# Patient Record
Sex: FEMALE | Race: WHITE | NOT HISPANIC OR LATINO | Employment: FULL TIME | URBAN - METROPOLITAN AREA
[De-identification: names, ages, dates, MRNs, and addresses within clinical notes are randomized per-mention and may not be internally consistent; named-entity substitution may affect disease eponyms.]

---

## 1995-01-18 LAB — PAP-ABSTRACT: NORMAL

## 1996-02-14 LAB — PAP-ABSTRACT: NORMAL

## 1997-02-10 LAB — PAP-ABSTRACT: NORMAL

## 1998-03-16 LAB — PAP-ABSTRACT: NORMAL

## 1999-03-08 LAB — PAP-ABSTRACT: NORMAL

## 2000-03-30 LAB — PAP-ABSTRACT: NORMAL

## 2001-03-02 LAB — PAP-ABSTRACT: NORMAL

## 2002-02-27 LAB — PAP-ABSTRACT: NORMAL

## 2003-02-27 LAB — PAP-ABSTRACT: NORMAL

## 2004-03-24 LAB — PAP-ABSTRACT: NORMAL

## 2005-03-23 LAB — PAP-ABSTRACT: NORMAL

## 2006-04-12 LAB
CREAT SERPL-MCNC: 0.9 MG/DL
GFR SERPL CREATININE-BSD FRML MDRD: NORMAL ML/MIN/1.73M2
TSH SERPL-ACNC: 2.44 MIU/L

## 2007-05-16 LAB
CHOLEST SERPL-MCNC: 203 MG/DL
CREAT SERPL-MCNC: 0.9 MG/DL
GFR SERPL CREATININE-BSD FRML MDRD: NORMAL ML/MIN/1.73M2
HDLC SERPL-MCNC: 72 MG/DL
LDLC SERPL CALC-MCNC: 119 MG/DL
NONHDLC SERPL-MCNC: NORMAL MG/DL
PAP-ABSTRACT: NORMAL
TRIGL SERPL-MCNC: 58 MG/DL

## 2007-12-03 LAB — PAP-ABSTRACT: NORMAL

## 2008-09-05 LAB
CHOLEST SERPL-MCNC: 219 MG/DL
CREAT SERPL-MCNC: 0.81 MG/DL
GFR SERPL CREATININE-BSD FRML MDRD: NORMAL ML/MIN/1.73M2
HDLC SERPL-MCNC: 81 MG/DL
LDLC SERPL CALC-MCNC: 124 MG/DL
NONHDLC SERPL-MCNC: NORMAL MG/DL
PAP-ABSTRACT: NORMAL
TRIGL SERPL-MCNC: 69 MG/DL

## 2009-09-16 LAB
CHOLEST SERPL-MCNC: 184 MG/DL
HDLC SERPL-MCNC: 67 MG/DL
LDLC SERPL CALC-MCNC: 103 MG/DL
NONHDLC SERPL-MCNC: NORMAL MG/DL
TRIGL SERPL-MCNC: 69 MG/DL

## 2010-10-06 LAB — PAP-ABSTRACT: NORMAL

## 2011-10-05 LAB
25 OH VIT D TOTAL: NORMAL
25 OH VIT D3: NORMAL
CHOLEST SERPL-MCNC: 200 MG/DL
CREAT SERPL-MCNC: 0.81 MG/DL
GFR SERPL CREATININE-BSD FRML MDRD: NORMAL ML/MIN/1.73M2
HDLC SERPL-MCNC: 69 MG/DL
LDLC SERPL CALC-MCNC: 114 MG/DL
NONHDLC SERPL-MCNC: NORMAL MG/DL
TRIGL SERPL-MCNC: 83 MG/DL
VITAMIN D2 SERPL-MCNC: NORMAL PG/ML

## 2011-10-17 LAB — PAP-ABSTRACT: NORMAL

## 2012-10-16 LAB
CHOLEST SERPL-MCNC: 202 MG/DL
HDLC SERPL-MCNC: 72 MG/DL
LDLC SERPL CALC-MCNC: 106 MG/DL
NONHDLC SERPL-MCNC: 130 MG/DL
TRIGL SERPL-MCNC: 120 MG/DL

## 2012-10-23 LAB — PAP-ABSTRACT: NORMAL

## 2013-10-21 LAB
CHOLEST SERPL-MCNC: 199 MG/DL
HDLC SERPL-MCNC: 77 MG/DL
LDLC SERPL CALC-MCNC: 108 MG/DL
NONHDLC SERPL-MCNC: NORMAL MG/DL
TRIGL SERPL-MCNC: 71 MG/DL

## 2014-12-02 LAB
CHOLEST SERPL-MCNC: 202 MG/DL
HDLC SERPL-MCNC: 80 MG/DL
LDLC SERPL CALC-MCNC: 108 MG/DL
NONHDLC SERPL-MCNC: NORMAL MG/DL
TRIGL SERPL-MCNC: 68 MG/DL

## 2016-04-12 LAB
CHOLEST SERPL-MCNC: 202 MG/DL
HDLC SERPL-MCNC: 72 MG/DL
LDLC SERPL CALC-MCNC: 117 MG/DL
NONHDLC SERPL-MCNC: NORMAL MG/DL
TRIGL SERPL-MCNC: 65 MG/DL

## 2017-01-29 ENCOUNTER — APPOINTMENT (OUTPATIENT)
Dept: CT IMAGING | Facility: CLINIC | Age: 57
End: 2017-01-29
Attending: EMERGENCY MEDICINE
Payer: COMMERCIAL

## 2017-01-29 ENCOUNTER — APPOINTMENT (OUTPATIENT)
Dept: CARDIOLOGY | Facility: CLINIC | Age: 57
End: 2017-01-29
Attending: INTERNAL MEDICINE
Payer: COMMERCIAL

## 2017-01-29 ENCOUNTER — HOSPITAL ENCOUNTER (OUTPATIENT)
Facility: CLINIC | Age: 57
Setting detail: OBSERVATION
Discharge: HOME | End: 2017-01-30
Attending: EMERGENCY MEDICINE | Admitting: INTERNAL MEDICINE
Payer: COMMERCIAL

## 2017-01-29 DIAGNOSIS — R55 SYNCOPE, UNSPECIFIED SYNCOPE TYPE: Primary | ICD-10-CM

## 2017-01-29 DIAGNOSIS — R07.9 ACUTE CHEST PAIN: ICD-10-CM

## 2017-01-29 LAB
ALBUMIN SERPL-MCNC: 3.4 G/DL (ref 3.4–5)
ALP SERPL-CCNC: 78 U/L (ref 40–150)
ALT SERPL W P-5'-P-CCNC: 54 U/L (ref 0–50)
ANION GAP SERPL CALCULATED.3IONS-SCNC: 6 MMOL/L (ref 3–14)
AST SERPL W P-5'-P-CCNC: 44 U/L (ref 0–45)
BASOPHILS # BLD AUTO: 0 10E9/L (ref 0–0.2)
BASOPHILS NFR BLD AUTO: 0.2 %
BILIRUB SERPL-MCNC: 0.3 MG/DL (ref 0.2–1.3)
BUN SERPL-MCNC: 16 MG/DL (ref 7–30)
CALCIUM SERPL-MCNC: 8.5 MG/DL (ref 8.5–10.1)
CHLORIDE SERPL-SCNC: 104 MMOL/L (ref 94–109)
CO2 SERPL-SCNC: 31 MMOL/L (ref 20–32)
CREAT SERPL-MCNC: 0.86 MG/DL (ref 0.52–1.04)
DIFFERENTIAL METHOD BLD: ABNORMAL
EOSINOPHIL # BLD AUTO: 0.1 10E9/L (ref 0–0.7)
EOSINOPHIL NFR BLD AUTO: 2 %
ERYTHROCYTE [DISTWIDTH] IN BLOOD BY AUTOMATED COUNT: 12.4 % (ref 10–15)
GFR SERPL CREATININE-BSD FRML MDRD: 68 ML/MIN/1.7M2
GLUCOSE SERPL-MCNC: 105 MG/DL (ref 70–99)
HCT VFR BLD AUTO: 38.6 % (ref 35–47)
HGB BLD-MCNC: 13.1 G/DL (ref 11.7–15.7)
IMM GRANULOCYTES # BLD: 0 10E9/L (ref 0–0.4)
IMM GRANULOCYTES NFR BLD: 0.2 %
INTERPRETATION ECG - MUSE: NORMAL
LYMPHOCYTES # BLD AUTO: 1.2 10E9/L (ref 0.8–5.3)
LYMPHOCYTES NFR BLD AUTO: 17.9 %
MAGNESIUM SERPL-MCNC: 2.1 MG/DL (ref 1.6–2.3)
MCH RBC QN AUTO: 31.4 PG (ref 26.5–33)
MCHC RBC AUTO-ENTMCNC: 33.9 G/DL (ref 31.5–36.5)
MCV RBC AUTO: 93 FL (ref 78–100)
MONOCYTES # BLD AUTO: 0.5 10E9/L (ref 0–1.3)
MONOCYTES NFR BLD AUTO: 7.6 %
NEUTROPHILS # BLD AUTO: 4.6 10E9/L (ref 1.6–8.3)
NEUTROPHILS NFR BLD AUTO: 72.1 %
NRBC # BLD AUTO: 0 10*3/UL
NRBC BLD AUTO-RTO: 0 /100
PLATELET # BLD AUTO: 140 10E9/L (ref 150–450)
POTASSIUM SERPL-SCNC: 4 MMOL/L (ref 3.4–5.3)
PROT SERPL-MCNC: 6.7 G/DL (ref 6.8–8.8)
RBC # BLD AUTO: 4.17 10E12/L (ref 3.8–5.2)
SODIUM SERPL-SCNC: 141 MMOL/L (ref 133–144)
TROPONIN I SERPL-MCNC: NORMAL UG/L (ref 0–0.04)
WBC # BLD AUTO: 6.4 10E9/L (ref 4–11)

## 2017-01-29 PROCEDURE — 99253 IP/OBS CNSLTJ NEW/EST LOW 45: CPT | Performed by: NEUROLOGICAL SURGERY

## 2017-01-29 PROCEDURE — 25000125 ZZHC RX 250: Performed by: EMERGENCY MEDICINE

## 2017-01-29 PROCEDURE — 80053 COMPREHEN METABOLIC PANEL: CPT | Performed by: EMERGENCY MEDICINE

## 2017-01-29 PROCEDURE — G0378 HOSPITAL OBSERVATION PER HR: HCPCS

## 2017-01-29 PROCEDURE — 99285 EMERGENCY DEPT VISIT HI MDM: CPT | Mod: 25

## 2017-01-29 PROCEDURE — 96365 THER/PROPH/DIAG IV INF INIT: CPT

## 2017-01-29 PROCEDURE — 25000132 ZZH RX MED GY IP 250 OP 250 PS 637: Performed by: INTERNAL MEDICINE

## 2017-01-29 PROCEDURE — 71260 CT THORAX DX C+: CPT

## 2017-01-29 PROCEDURE — 84484 ASSAY OF TROPONIN QUANT: CPT | Performed by: INTERNAL MEDICINE

## 2017-01-29 PROCEDURE — 93306 TTE W/DOPPLER COMPLETE: CPT | Mod: 26 | Performed by: INTERNAL MEDICINE

## 2017-01-29 PROCEDURE — 93005 ELECTROCARDIOGRAM TRACING: CPT

## 2017-01-29 PROCEDURE — 83735 ASSAY OF MAGNESIUM: CPT | Performed by: INTERNAL MEDICINE

## 2017-01-29 PROCEDURE — 84484 ASSAY OF TROPONIN QUANT: CPT | Performed by: EMERGENCY MEDICINE

## 2017-01-29 PROCEDURE — 40000264 ECHO COMPLETE WITH LUMASON

## 2017-01-29 PROCEDURE — 99220 ZZC INITIAL OBSERVATION CARE,LEVL III: CPT | Performed by: INTERNAL MEDICINE

## 2017-01-29 PROCEDURE — 25000128 H RX IP 250 OP 636: Performed by: INTERNAL MEDICINE

## 2017-01-29 PROCEDURE — 85025 COMPLETE CBC W/AUTO DIFF WBC: CPT | Performed by: EMERGENCY MEDICINE

## 2017-01-29 PROCEDURE — 25500064 ZZH RX 255 OP 636: Performed by: EMERGENCY MEDICINE

## 2017-01-29 PROCEDURE — 70450 CT HEAD/BRAIN W/O DYE: CPT

## 2017-01-29 PROCEDURE — 25500064 ZZH RX 255 OP 636: Performed by: INTERNAL MEDICINE

## 2017-01-29 PROCEDURE — 36415 COLL VENOUS BLD VENIPUNCTURE: CPT | Performed by: INTERNAL MEDICINE

## 2017-01-29 PROCEDURE — 99204 OFFICE O/P NEW MOD 45 MIN: CPT | Performed by: INTERNAL MEDICINE

## 2017-01-29 RX ORDER — LIDOCAINE 40 MG/G
CREAM TOPICAL
Status: DISCONTINUED | OUTPATIENT
Start: 2017-01-29 | End: 2017-01-30 | Stop reason: HOSPADM

## 2017-01-29 RX ORDER — NITROGLYCERIN 0.4 MG/1
0.4 TABLET SUBLINGUAL EVERY 5 MIN PRN
Status: DISCONTINUED | OUTPATIENT
Start: 2017-01-29 | End: 2017-01-30 | Stop reason: HOSPADM

## 2017-01-29 RX ORDER — LISINOPRIL 2.5 MG/1
2.5 TABLET ORAL DAILY
Status: DISCONTINUED | OUTPATIENT
Start: 2017-01-29 | End: 2017-01-30 | Stop reason: HOSPADM

## 2017-01-29 RX ORDER — ASPIRIN 81 MG/1
81 TABLET ORAL DAILY
Status: DISCONTINUED | OUTPATIENT
Start: 2017-01-30 | End: 2017-01-29

## 2017-01-29 RX ORDER — OXYCODONE HYDROCHLORIDE 5 MG/1
5-10 TABLET ORAL
Status: DISCONTINUED | OUTPATIENT
Start: 2017-01-29 | End: 2017-01-30 | Stop reason: HOSPADM

## 2017-01-29 RX ORDER — NALOXONE HYDROCHLORIDE 0.4 MG/ML
.1-.4 INJECTION, SOLUTION INTRAMUSCULAR; INTRAVENOUS; SUBCUTANEOUS
Status: DISCONTINUED | OUTPATIENT
Start: 2017-01-29 | End: 2017-01-30 | Stop reason: HOSPADM

## 2017-01-29 RX ORDER — IOPAMIDOL 755 MG/ML
80 INJECTION, SOLUTION INTRAVASCULAR ONCE
Status: COMPLETED | OUTPATIENT
Start: 2017-01-29 | End: 2017-01-29

## 2017-01-29 RX ORDER — HYDROMORPHONE HYDROCHLORIDE 1 MG/ML
.3-.5 INJECTION, SOLUTION INTRAMUSCULAR; INTRAVENOUS; SUBCUTANEOUS
Status: DISCONTINUED | OUTPATIENT
Start: 2017-01-29 | End: 2017-01-30 | Stop reason: HOSPADM

## 2017-01-29 RX ORDER — ATORVASTATIN CALCIUM 40 MG/1
40 TABLET, FILM COATED ORAL DAILY
Status: DISCONTINUED | OUTPATIENT
Start: 2017-01-29 | End: 2017-01-30 | Stop reason: HOSPADM

## 2017-01-29 RX ORDER — ASPIRIN 81 MG/1
81 TABLET, CHEWABLE ORAL DAILY
Status: DISCONTINUED | OUTPATIENT
Start: 2017-01-29 | End: 2017-01-30 | Stop reason: HOSPADM

## 2017-01-29 RX ORDER — ALUMINA, MAGNESIA, AND SIMETHICONE 2400; 2400; 240 MG/30ML; MG/30ML; MG/30ML
15-30 SUSPENSION ORAL EVERY 4 HOURS PRN
Status: DISCONTINUED | OUTPATIENT
Start: 2017-01-29 | End: 2017-01-30 | Stop reason: HOSPADM

## 2017-01-29 RX ORDER — ACETAMINOPHEN 650 MG/1
650 SUPPOSITORY RECTAL EVERY 4 HOURS PRN
Status: DISCONTINUED | OUTPATIENT
Start: 2017-01-29 | End: 2017-01-30 | Stop reason: HOSPADM

## 2017-01-29 RX ORDER — ACETAMINOPHEN 325 MG/1
650 TABLET ORAL EVERY 4 HOURS PRN
Status: DISCONTINUED | OUTPATIENT
Start: 2017-01-29 | End: 2017-01-30 | Stop reason: HOSPADM

## 2017-01-29 RX ADMIN — SULFUR HEXAFLUORIDE 5 ML: KIT at 15:15

## 2017-01-29 RX ADMIN — LISINOPRIL 2.5 MG: 2.5 TABLET ORAL at 17:05

## 2017-01-29 RX ADMIN — HEPARIN SODIUM 650 UNITS/HR: 10000 INJECTION, SOLUTION INTRAVENOUS at 02:57

## 2017-01-29 RX ADMIN — IOPAMIDOL 80 ML: 755 INJECTION, SOLUTION INTRAVENOUS at 01:48

## 2017-01-29 RX ADMIN — SODIUM CHLORIDE 80 ML: 9 INJECTION, SOLUTION INTRAVENOUS at 01:48

## 2017-01-29 RX ADMIN — ATORVASTATIN CALCIUM 40 MG: 40 TABLET, FILM COATED ORAL at 19:45

## 2017-01-29 RX ADMIN — ASPIRIN 81 MG 81 MG: 81 TABLET ORAL at 10:30

## 2017-01-29 RX ADMIN — SODIUM CHLORIDE 1000 ML: 9 INJECTION, SOLUTION INTRAVENOUS at 04:48

## 2017-01-29 RX ADMIN — Medication 3400 UNITS: at 02:56

## 2017-01-29 ASSESSMENT — ENCOUNTER SYMPTOMS
FEVER: 0
HEADACHES: 0
ABDOMINAL PAIN: 1
SHORTNESS OF BREATH: 0
COUGH: 0

## 2017-01-29 ASSESSMENT — ACTIVITIES OF DAILY LIVING (ADL)
RETIRED_EATING: 0-->INDEPENDENT
COGNITION: 0 - NO COGNITION ISSUES REPORTED
FALL_HISTORY_WITHIN_LAST_SIX_MONTHS: YES
BATHING: 0-->INDEPENDENT
AMBULATION: 0-->INDEPENDENT
RETIRED_COMMUNICATION: 0-->UNDERSTANDS/COMMUNICATES WITHOUT DIFFICULTY
TRANSFERRING: 0-->INDEPENDENT
NUMBER_OF_TIMES_PATIENT_HAS_FALLEN_WITHIN_LAST_SIX_MONTHS: 1
DRESS: 0-->INDEPENDENT
TOILETING: 0-->INDEPENDENT
SWALLOWING: 0-->SWALLOWS FOODS/LIQUIDS WITHOUT DIFFICULTY

## 2017-01-29 NOTE — ED NOTES
Patients admit status changed by Dr Mckinnon to observation. Patient shown obs video, no further questions. Heparin DCd per Dr. Mckinnon

## 2017-01-29 NOTE — PLAN OF CARE
Problem: Goal Outcome Summary  Goal: Goal Outcome Summary  VSS.  A&O x 4.  Tele SR/SB.  Denies CP.  Up with SBA.  Troponin negative x 2, next Troponin at 0900.  Plan for echo, cardiology, and neuro consult.  Will continue to monitor.

## 2017-01-29 NOTE — H&P
[facility]    History and Physical  Hospitalist       Date of Admission:  1/29/2017  Date of Service (when I saw the patient): 01/29/2017    Assessment and Plan  Glenny Butts is a 56 year old female who presents with epigastric discomfort at approximately midnight followed by stabbing chest discomfort and a subsequent episode of syncope when she was getting up to go to the bathroom. Patient endorses similar type of chest pain with prior myocardial infarction January 2015 though syncope was not present.    Atypical chest pain: Patient with onset of epigastric and subsequent stabbing chest pain while at rest approximately 11:50 PM 1/28/17. No nitroglycerin administered. Patient had a syncopal episode while ambulate into the bathroom and upon awakening, no chest discomfort. Was able to exercise on the elliptical earlier in the day with no discomfort.   -Discontinuing heparin drip initiated in the emergency department as somewhat inconsistent with unstable angina given good exercise tolerance even earlier in the day. Would reinitiate if patient with positive troponin, consider restart as well if recurrent chest discomfort.  -Continuing to trend troponin  -Cardiology consulted; patient with a known history of abnormal stress test as well as known jailed second diagonal on October 2015 coronary angiography following that abnormal stress test.  -TTE evaluation for syncopal  -Cardiac telemetry  -Continue prior to admission lisinopril 2.5 mg daily, metoprolol 12.5 mg daily, atorvastatin 40 mg daily aspirin 81 mg daily in the setting of known coronary artery disease; patient did receive loading dose of aspirin in the emergency department.    Syncopal episode: patient with a non-prodromal syncopal episode after returning from the bedroom. Unclear if this represents a vagal episode in the setting of baseline bradycardia on low-dose metoprolol and onset of pain prior to episode. Alternatively, with known  "frequent PVCs and coronary artery disease, consider potential ventricular tachyarrhythmia resulting in syncope.   -monitor on cardiac telemetry  -Magnesium, potassium replacement protocols in place  -TTE  -Orthostatic blood pressure and pulse q. shift  -neurosurgery consulted for trauma eval given \"closed head injury without bleed.\" Request review of chart and assessment if necessary per state guidelines. No overt signs of trauma, patient asymptomatic currently.    Bradycardia: Mild. Consistent with appropriate beta-blockade in the setting of known jailed diagonal #2. Unclear if contributed to syncopal event  -Cardiac telemetry  -Continue metoprolol as above given reported chest discomfort which could represent anginal equivalent    Coronary artery disease: mid LAD drug-eluting stent January 2015, known jailed diagonal #2 90% stenosis on repeat October 2015 angiogram, diffuse 25-35 percent disease otherwise  -Continue prior to admission lisinopril 2.5 mg daily, metoprolol 12.5 mg daily, atorvastatin 40 mg daily aspirin 81 mg daily in the setting of known coronary artery disease; patient did receive loading dose of aspirin in the emergency department.  -Cardiology consult as above given known abnormal stress test historically.    DVT Prophylaxis: ambulate    Code Status: Full Code    Disposition: Expected discharge potentially 1/30/17 pending troponin trend in cardiology consultation    [facility]    Primary Care Physician  Sujit Galloway    Chief Complaint  Stabbing epigastric and chest pain, syncopal episode without prodrome    History is obtained from the patient, chart review Dr. Mohamud in the emergency department, patient's  at bedside.    History of Present Illness  Glenny Butts is a 56 year old female who presents with onset of epigastric pain, stabbing in nature, similar to prior myocardial infarction with LAD stenting in January 2015.This was also associated with the sensation of feeling " "cold and clammy. Patient stood up to go to the bathroom as she was uncertain if this was gas pain, and as she was returning from the bathroom had a syncopal episode. Patient denies any sensation of lightheadedness or prodrome.  heard a thump from downstairs, though fall was unwitnessed. Patient was noted to be lying on her left side on a carpeted floor, unconscious for several seconds prior to slowly awakening. Does not appear to have had a postictal state as was communicating with her  and recalls this. At time of awakening, patient did not have any chest discomfort.Patient states that her epigastric and substernal stabbing chest discomfort was intense, very similar to her 2015 episode with the exception of in 2015 she had some left arm paresthesias and described \"heaviness\" which she does not currently have. Did not receive any nitroglycerin given her resolution of chest discomfort. States that she did take her daily aspirin, has been compliant with her lisinopril and metoprolol as well. Received additional loading dose of aspirin in the emergency department. Noted to have somewhat frequent PVCs en route to the emergency department, patient states that she has had similar beats in the past as well. Does not note PVCswhen they occur on telemetry in the emergency department.    In the emergency department, patient was initiated on heparin drip given concern for the quality of her pain. Lengthy discussion with patient and  regarding this. Patient is currently chest pain-free, EKG with normal sinus rhythm and no ST elevations, patient was able to exercise on her elliptical earlier in the morning without any chest discomfort, and exercises regularly, unusual for a myocardial infarction to result in syncope and with heparin being a high risk medication with no clear indication at this time, have discontinued heparin drip. If troponin elevates, we will reinitiate. Of note, patient states that during " her myocardial infarction in 2015, she had chest pain that abated though subsequently had a troponin elevation and was taken urgently to the cath lab. Did not receive heparin at that last admission.    Patient denies any blood in her stools, denies any diarrhea. Did have some nausea with current event.    Past Medical History   I have reviewed this patient's medical history and updated it with pertinent information if needed.   Past Medical History   Diagnosis Date     Hyperlipidemia      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      Stress incontinence      CAD (coronary artery disease) 2/15     Stent to LAD 01/2015     NSTEMI (non-ST elevated myocardial infarction) (H) 2/15     Ischemic cardiomyopathy      S/P coronary angiogram 10-5-2015     patent prev placed stent-aggressive medical management       Past Surgical History  I have reviewed this patient's surgical history and updated it with pertinent information if needed.  Past Surgical History   Procedure Laterality Date     Colonoscopy       Cholecystectomy       C dexa, bone density, axial skel       [place] teeth       Hysterectomy vaginal N/A 12/19/2014     Procedure: HYSTERECTOMY VAGINAL;  Surgeon: Aarti Ramirez MD;  Location: [facility] OR     Heart cath, angioplasty  1/27/15      mid LAD-2.75 X 28 mm Promus premier FAM        Prior to Admission Medications  Prior to Admission Medications   Prescriptions Last Dose Informant Patient Reported? Taking?   ASPIRIN PO   Yes Yes   Sig: Take 81 mg by mouth daily   DiphenhydrAMINE HCl (BENADRYL PO)   Yes Yes   Sig: Take 25 mg by mouth as needed    atorvastatin (LIPITOR) 40 MG tablet   No Yes   Sig: Take 1 tablet (40 mg) by mouth daily   doxylamine (UNISOM) 25 MG TABS   Yes Yes   Sig: Take 25 mg by mouth nightly as needed   lisinopril (PRINIVIL,ZESTRIL) 2.5 MG tablet   No Yes   Sig: Take 1 tablet (2.5 mg) by mouth daily   metoprolol (TOPROL-XL) 25 MG 24 hr tablet   No Yes   Sig: Take 0.5 tablets (12.5 mg) by mouth daily    nitroglycerin (NITROSTAT) 0.4 MG SL tablet   No Yes   Sig: Place 1 tablet (0.4 mg) under the tongue every 5 minutes as needed for chest pain If you are still having symptoms after 3 doses (15 minutes) call 911.      Facility-Administered Medications: None     Allergies  Allergies   Allergen Reactions     Dust Mites      Grass      Trees        Social History  I have reviewed this patient's social history and updated it with pertinent information if needed. Glenny Butts  reports that she has never smoked. She has never used smokeless tobacco. She reports that she drinks alcohol. She reports that she does not use illicit drugs.    Family History  I have reviewed this patient's family history and updated it with pertinent information if needed.   Family History   Problem Relation Age of Onset     Dementia Mother      Myocardial Infarction Father 34     MI     Hypertension Brother      Hypertension Brother        Review of Systems  The 10 point Review of Systems is negative other than noted in the HPI or here.   No diarrhea, no palpitations, no tunnel vision  Patient did endorse some intermittent tingling in her left axilla    Physical Exam  Temp: 98.4  F (36.9  C) Temp src: Oral BP: 110/67 mmHg   Heart Rate: 54 Resp: 19 SpO2: 100 % O2 Device: None (Room air)    Vital Signs with Ranges  Temp:  [98.4  F (36.9  C)] 98.4  F (36.9  C)  Heart Rate:  [50-58] 54  Resp:  [5-19] 19  BP: ()/(58-69) 110/67 mmHg  SpO2:  [97 %-100 %] 100 %  124 lbs 0 oz    Constitutional: no acute distress, alert, conversant  Eyes: no scleral icterus or injection  HEENT: moist mucous membranes  Respiratory: breath sounds clear bilaterally to auscultation, no wheezes, no crackles  Cardiovascular: regular rate and rhythm, no murmur  GI: abdomen soft, non-tender, normoactive bowel sounds, no masses  Lymph/Hematologic: no lower extremity swelling  Genitourinary: no costovertebral angle tenderness to percussion  Skin: no rashes, somewhat  lencho complexion  Musculoskeletal: muscular tone intact in all extremities  Neurologic: mental status grossly intact, no focal deficits, alert  Psychiatric: normal affect    Data  Data reviewed today:  I personally reviewed EKG demonstrating normal sinus rhythm, T-wave inversion in V1.    Recent Labs  Lab 01/29/17  0111   WBC 6.4   HGB 13.1   MCV 93   *      POTASSIUM 4.0   CHLORIDE 104   CO2 31   BUN 16   CR 0.86   ANIONGAP 6   PARKER 8.5   *   ALBUMIN 3.4   PROTTOTAL 6.7*   BILITOTAL 0.3   ALKPHOS 78   ALT 54*   AST 44   TROPI <0.015The 99th percentile for upper reference range is 0.045 ug/L.  Troponin values in the range of 0.045 - 0.120 ug/L may be associated with risks of adverse clinical events.       Recent Results (from the past 24 hour(s))   Head CT w/o contrast    Narrative    CT HEAD WITHOUT CONTRAST   1/29/2017 1:53 AM     HISTORY: Syncope.    COMPARISON: None.    TECHNIQUE: Without intravenous contrast, helical sections were  acquired through the brain. Coronal reconstructions were generated.  Radiation dose for this scan was reduced using automated exposure  control, adjustment of the mA and/or kV according to the patient's  size, or iterative reconstruction technique.    FINDINGS: No intra-axial mass, mass effect or midline shift. Normal  gray-white matter differentiation. No visualized acute intra-axial  hemorrhage. The cerebral ventricles are normal in caliber. The basal  cisterns are patent. No extra-axial fluid collection. The visualized  portions of the paranasal sinuses and mastoid air cells are  unremarkable.      Impression    IMPRESSION: No evidence of acute intracranial abnormality.     PEDRO PABLO COSBY MD   Chest CT w IV contrast only, TRAUMA / DISSECTION    Narrative    CT CHEST WITH CONTRAST   1/29/2017 1:53 AM     HISTORY: Chest pain and syncope.    COMPARISON: 1/27/2015.    TECHNIQUE: Following the uneventful administration of 80mL Isovue-370  intravenous contrast,  helical sections were acquired through the lungs  according to the pulmonary embolism protocol. Coronal reconstructions  were generated. Radiation dose for this scan was reduced using  automated exposure control, adjustment of the mA and/or kV according  to the patient's size, or iterative reconstruction technique.     FINDINGS: No visualized pulmonary embolus. The thoracic aorta is  normal in caliber without dissection. Atherosclerotic calcification in  the thoracic aorta and coronary arteries.    The lungs are clear. No pleural or pericardial effusion. No enlarged  lymph nodes in the chest.    Scan through the upper abdomen is significant for prior  cholecystectomy.      Impression    IMPRESSION:   1. No visualized pulmonary embolus.  2. No evidence of active pulmonary disease.    PEDRO PABLO COSBY MD

## 2017-01-29 NOTE — PROGRESS NOTES
Full consult dictated.  In brief, 55 yo F with Hx of CAD/NSTEMI who p/w CP and syncope.    Echo is WNL.  Telemetry and Tn were negative.    Plan:  - Stress test tomorrow (Nuc).  - If stress test is negative she may go home with a 30-days event monitor.  - F/u in clinic in 30 days.    Tico Echevarria MD

## 2017-01-29 NOTE — IP AVS SNAPSHOT
[facility]    [address] [place] [address]., [address]    [place] [address]    Phone:  [phone]                                       After Visit Summary   1/29/2017    Glenny Butts    MRN: [id number]           After Visit Summary Signature Page     I have received my discharge instructions, and my questions have been answered. I have discussed any challenges I see with this plan with the nurse or doctor.    ..........................................................................................................................................  Patient/Patient Representative Signature      ..........................................................................................................................................  Patient Representative Print Name and Relationship to Patient    ..................................................               ................................................  Date                                            Time    ..........................................................................................................................................  Reviewed by Signature/Title    ...................................................              ..............................................  Date                                                            Time

## 2017-01-29 NOTE — ED NOTES
Bed: ED28  Expected date: 1/29/17  Expected time: 12:55 AM  Means of arrival: Ambulance  Comments:  Erik [phone] [id number] CP

## 2017-01-29 NOTE — CONSULTS
[facility]    Neurosurgery Consultation     Date of Admission:  1/29/2017  Date of Consult (When I saw the patient): 01/29/2017    Assessment and Plan  Glenny Butts is a 56 year old female who was admitted on 1/29/2017. I was asked to see the patient for management of closed head trauma.    Active Problems:    Assessment: Possible closed head trauma  Plan:    Head CT without abnormality  No headache or neurologic abnormality  No evidence of concussion  No follow up needed unless new symptoms develop          Code Status   Full Code    Reason for Consult  Reason for consult: I was asked by Dr. Mckinnon to evaluate this patient for management of closed head trauma.    Primary Care Physician  Sujit Galloway    Chief Complaint  Syncopal fall    History of Present Illness  Glenny Butts is a 56 year old female who presents with fall.  Acutely developed chest pain and light headedness, and fell.  Does not recall striking her head.  No external signs of trauma.  No headache or neurologic symptoms.    Past Medical History  I have reviewed this patient's medical history and updated it with pertinent information if needed.   Past Medical History   Diagnosis Date     Hyperlipidemia      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      Stress incontinence      CAD (coronary artery disease) 2/15     Stent to LAD 01/2015     NSTEMI (non-ST elevated myocardial infarction) (H) 2/15     Ischemic cardiomyopathy      S/P coronary angiogram 10-5-2015     patent prev placed stent-aggressive medical management       Past Surgical History  I have reviewed this patient's surgical history and updated it with pertinent information if needed.  Past Surgical History   Procedure Laterality Date     Colonoscopy       Cholecystectomy       C dexa, bone density, axial skel       [place] teeth       Hysterectomy vaginal N/A 12/19/2014     Procedure: HYSTERECTOMY VAGINAL;  Surgeon: Aarti Ramirez MD;  Location:  OR     Heart  cath, angioplasty  1/27/15      mid LAD-2.75 X 28 mm Promus premier FAM        Prior to Admission Medications  Prior to Admission Medications   Prescriptions Last Dose Informant Patient Reported? Taking?   ASPIRIN PO   Yes Yes   Sig: Take 81 mg by mouth daily   DiphenhydrAMINE HCl (BENADRYL PO)   Yes Yes   Sig: Take 25 mg by mouth as needed    atorvastatin (LIPITOR) 40 MG tablet   No Yes   Sig: Take 1 tablet (40 mg) by mouth daily   doxylamine (UNISOM) 25 MG TABS   Yes Yes   Sig: Take 25 mg by mouth nightly as needed   lisinopril (PRINIVIL,ZESTRIL) 2.5 MG tablet   No Yes   Sig: Take 1 tablet (2.5 mg) by mouth daily   metoprolol (TOPROL-XL) 25 MG 24 hr tablet   No Yes   Sig: Take 0.5 tablets (12.5 mg) by mouth daily   nitroglycerin (NITROSTAT) 0.4 MG SL tablet   No Yes   Sig: Place 1 tablet (0.4 mg) under the tongue every 5 minutes as needed for chest pain If you are still having symptoms after 3 doses (15 minutes) call 911.      Facility-Administered Medications: None     Allergies  Allergies   Allergen Reactions     Dust Mites      Grass      Trees        Social History  I have reviewed this patient's social history and updated it with pertinent information if needed. Glenny LAURENT Beckie  reports that she has never smoked. She has never used smokeless tobacco. She reports that she drinks alcohol. She reports that she does not use illicit drugs.    Family History  I have reviewed this patient's family history and updated it with pertinent information if needed.   Family History   Problem Relation Age of Onset     Dementia Mother      Myocardial Infarction Father 34     MI     Hypertension Brother      Hypertension Brother        Review of Systems  C: NEGATIVE for fever, chills, change in weight  I: NEGATIVE for worrisome rashes, moles or lesions  E: NEGATIVE for vision changes or irritation  E/M: NEGATIVE for ear, mouth and throat problems  R: NEGATIVE for significant cough or SOB  B: NEGATIVE for masses, tenderness or  "discharge  CV: NEGATIVE for chest pain, palpitations or peripheral edema  GI: NEGATIVE for nausea, abdominal pain, heartburn, or change in bowel habits  : NEGATIVE for frequency, dysuria, or hematuria  M: NEGATIVE for significant arthralgias or myalgia  N: NEGATIVE for weakness, dizziness or paresthesias  E: NEGATIVE for temperature intolerance, skin/hair changes  H: NEGATIVE for bleeding problems  P: NEGATIVE for changes in mood or affect    Physical Exam  Temp: 98.3  F (36.8  C) Temp src: Oral BP: 109/65 mmHg   Heart Rate: 89 Resp: 16 SpO2: 99 % O2 Device: None (Room air)    Vital Signs with Ranges  Temp:  [98  F (36.7  C)-98.4  F (36.9  C)] 98.3  F (36.8  C)  Heart Rate:  [47-89] 89  Resp:  [5-19] 16  BP: ()/(51-70) 109/65 mmHg  SpO2:  [97 %-100 %] 99 %  116 lbs 13.5 oz    Heart Rate: 89, Blood pressure 109/65, temperature 98.3  F (36.8  C), temperature source Oral, resp. rate 16, height 1.676 m (5' 6\"), weight 53 kg (116 lb 13.5 oz), SpO2 99 %, not currently breastfeeding.  116 lbs 13.5 oz  HEENT:  Normocephalic, atraumatic.  PERRLA.  EOM s intact.   Neck:  Supple, non-tender, without lymphadenopathy.  Heart:  No peripheral edema  Lungs:  No SOB  Abdomen:  Non-distended.  Skin:  Warm and dry.  Extremities:  No edema, cyanosis or clubbing.    NEUROLOGICAL EXAMINATION:     Mental status:  Alert and Oriented x 3, speech is fluent.  Cranial nerves:  II-XII intact.   Motor:  Strength is 5/5 throughout the upper and lower extremities  Shoulder Abduction:  Right:  5/5   Left:  5/5  Biceps:                      Right:  5/5   Left:  5/5  Triceps:                     Right:  5/5   Left:  5/5  Wrist Extensors:       Right:  5/5   Left:  5/5  Wrist Flexors:           Right:  5/5   Left:  5/5  interosseus :            Right:  5/5   Left:  5/5   Hip Flexor:                Right: 5/5  Left:  5/5  Hip Adductor:             Right:  5/5  Left:  5/5  Hip Abductor:             Right:  5/5  Left:  5/5  Gastroc Soleus:        " Right:  5/5  Left:  5/5  Tib/Ant:                      Right:  5/5  Left:  5/5  EHL:                     Right:  5/5  Left:  5/5  Sensation:  Intact  Reflexes:   Negative Babinski.  Negative Clonus.    Coordination:  Smooth finger to nose testing.   Negative pronator drift.  Smooth tandem walking  Gait:  Stable, no difficulty with heel or toe walking.      Data  All new lab and imaging data was personally reviewed by me.  CT: No acute intracranial abnormality  CBC RESULTS:   Recent Labs   Lab Test  01/29/17   0111   WBC  6.4   RBC  4.17   HGB  13.1   HCT  38.6   MCV  93   MCH  31.4   MCHC  33.9   RDW  12.4   PLT  140*     Basic Metabolic Panel:  NA      141   1/29/2017   POTASSIUM      4.0   1/29/2017  CHLORIDE      104   1/29/2017  PARKER      8.5   1/29/2017  CO2       31   1/29/2017  BUN       16   1/29/2017  CR     0.86   1/29/2017  GLC      105   1/29/2017  INR:  INR     0.95   10/5/2015

## 2017-01-29 NOTE — ED PROVIDER NOTES
"  History     Chief Complaint:  Chest Pain and Loss of Consciousness    HPI:    Glenny Butts is a 56 year old female with a history of myocardial infarction, CAD, cardiomyopathy, and hyperlipidemia who presents with chest pain and a syncopal episode. The patient reports that she began experiencing indigestion and abdominal discomfort around 1630 yesterday. As she was sleeping, she awoke to mid-sternal, stabbing chest pain. She got up to use the bathroom and does not remember anything besides waking up on the floor. The patient's  reports hearing her get up, followed shortly by a lout \"geronimo\" as she hit the ground. He states when he found her, her eyes were open, but it took her at least 10 seconds before she started responding to him. As he called EMS, the patient was given 324 mg of Aspirin which resolved her chest pain. She denies any headache, leg pain or swelling, shortness of breath, fever, or cough.     CARDIAC RISK FACTORS:  Sex:    Female  Tobacco:   No  Hypertension:   No  Hyperlipidemia:  Yes  Diabetes:   No  Family History:  Yes    PE/DVT RISK FACTORS:  Sex:    Female   Hormones:   No  Tobacco:   No  Cancer:   No  Travel:   No  Surgery:   No  Other immobilization: No  Personal history:  No  Family history:  No     Allergies:  Dust mites  Grass   Trees      Medications:    Lipitor  Doxylamine  Metoprolol  Lisinopril  Nitrostat  Aspirin  Benadryl      Past Medical History:    Hyperlipidemia  Gallbladder disease  HPV  CAD  NSTEMI  Ischemic cardiomyopathy    Past Surgical History:    Colonoscopy  Cholecystectomy  Hysterectomy  Angioplasty     Family History:    Dementia Mother  Myocardial Infarction- Father  Hypertension- Brother x2     Social History:  Smoking status: Never Smoker  Alcohol use: Yes   Marital Status:     Presents with  at bedside.     Review of Systems   Constitutional: Negative for fever.   Respiratory: Negative for cough and shortness of breath.    Cardiovascular: " "Positive for chest pain. Negative for leg swelling.   Gastrointestinal: Positive for abdominal pain.   Neurological: Positive for syncope. Negative for headaches.   All other systems reviewed and are negative.      Physical Exam     Patient Vitals for the past 24 hrs:   BP Temp Temp src Heart Rate Resp SpO2 Height Weight   01/29/17 0330 - - - 54 19 100 % - -   01/29/17 0301 110/67 mmHg - - 58 14 100 % - -   01/29/17 0300 - - - 55 16 100 % - -   01/29/17 0259 114/58 mmHg - - 53 8 100 % - -   01/29/17 0258 109/61 mmHg - - 55 (!) 5 100 % - -   01/29/17 0245 - - - 52 13 100 % - -   01/29/17 0230 96/64 mmHg - - 50 11 98 % - -   01/29/17 0215 - - - 52 19 98 % - -   01/29/17 0200 108/60 mmHg - - 52 9 100 % - -   01/29/17 0130 102/59 mmHg - - 51 13 97 % - -   01/29/17 0106 130/69 mmHg 98.4  F (36.9  C) Oral 58 - 100 % 1.676 m (5' 6\") 56.246 kg (124 lb)       Physical Exam:  General: Well-nourished, no acute distress  Eyes: PERRL, conjunctivae pink no scleral icterus or conjunctival injection  ENT:  Moist mucus membranes, posterior oropharynx clear without erythema or exudates, no hemotympanum  Respiratory:  Lungs clear to auscultation bilaterally, no crackles/rubs/wheezes.  Good air movement.  No seatbelt sign or ecchymoses  CV: Normal rate and rhythm, no murmurs/rubs/gallops  GI:  Abdomen soft and non-distended.  Normoactive BS.  No tenderness, guarding or rebound  Skin: Warm, dry.  No rashes or petechiae  Musculoskeletal: No peripheral edema or calf tenderness.  No midline tenderness of the cervical/thoracic/lumbar spine.  No tenderness/crepitus/bony stepoffs over the clavicles, chest wall, pelvis, arms or legs.  Neuro: Alert and oriented to person/place/time  Psychiatric: Normal affect    Emergency Department Course     ECG (01:14:51):  Rate 52 bpm. KS interval 170. QRS duration 98. QT/QTc 432/401. P-R-T axes 77-73-72. Sinus bradycardia. Otherwise normal ECG. Interpreted at 0115 by Majo Mohamud MD.     No significant " change compared to ECG dated 10/5/2015.    Imaging:  Radiographic findings were communicated with the patient and family who voiced understanding of the findings.    Chest CT w/ IV contrast only, TRAUMA/DISSECTION:  IMPRESSION:   1. No visualized pulmonary embolus.  2. No evidence of active pulmonary disease.  As read by Radiology.     Head CT w/o contrast:  IMPRESSION: No evidence of acute intracranial abnormality.   As read by Radiology.     Laboratory:  CBC:  (L), o/w WNL (WBC 6.4, HGB 13.1)    CMP: Glucose 105 (H), Protein Total 6.7 (L), ALT 54 (H), o/w WNL (Creatinine 0.86)   Troponin I: <0.015    Interventions:  0148- Isovue-370 80 mL IV  0256- Heparin Loading Dose 3,400 Units IV  0257- Heparin Infusion 25,000 Units IV    Emergency Department Course:  Past medical records, nursing notes, and vitals reviewed.  0118: I performed an exam of the patient and obtained history, as documented above.    IV inserted and blood drawn.     The patient was placed on continuous cardiac monitoring and pulse oximetry.      The patient was sent for a chest and head CT while in the emergency department, findings above.     The above interventions were administered.    0255: I rechecked the patient. Explained findings to the patient and her .     Findings and plan explained to the Patient and spouse who consents to admission.     0258: Discussed the patient with Dr. Mckinnon, who will admit the patient to a CIC bed for further monitoring, evaluation, and treatment.      Impression & Plan      Medical Decision Making:  Glenny Dorado is a pleasant 56 year old woman who comes today with concerns for severe chest pain followed by a syncopal episode. She has a normal ECG and is pain free on arrival. Her pre-hospital ECG did show frequent PVC's. Her head CT showed no evidence of bleeding. A chest CT was obtained to rule out aortic dissection as a cause for her symptoms; this was normal. She does have a history of very  similar occurrence of these symptoms where she had an NSTEMI with a significant troponin elevation and was found to have a mid-LAD lesion that was stented. She has additional coronary artery disease, which is being medically managed at this time. Given the constellation of symptoms similar to when she had her NSTEMI, with significant LAD lesion, we started her on a Heparin drip after the CT scans were negative. We will admit her to the [facility] for further cardiac rule-out and cardiac consultation. She will be admitted by Dr. Mckinnon, at this point she is stable.     Diagnosis:    ICD-10-CM   1. Acute chest pain R07.9   2. Syncope, unspecified syncope type R55     Lindy Coats  1/29/2017    EMERGENCY DEPARTMENT    Lindy GUZMAN am serving as a scribe at 1:18 AM on 1/29/2017 to document services personally performed by Majo Mohamud MD based on my observations and the provider's statements to me.      Majo Mohamud MD  01/29/17 0943

## 2017-01-29 NOTE — ED NOTES
"[facility]  ED Nurse Handoff Report    ED Chief complaint: Chest Pain and Loss of Consciousness      ED Diagnosis:   Final diagnoses:   Acute chest pain   Syncope, unspecified syncope type       Code Status: Full Code    Allergies:   Allergies   Allergen Reactions     Dust Mites      Grass      Trees        Activity level:  Independent     Needed?: No    Isolation: No  Infection: Not Applicable    Bariatric?: No      Vital Signs:   Filed Vitals:    01/29/17 0106 01/29/17 0130 01/29/17 0200 01/29/17 0215   BP: 130/69 102/59 108/60    Temp: 98.4  F (36.9  C)      TempSrc: Oral      Resp:  13 9 19   Height: 1.676 m (5' 6\")      Weight: 56.246 kg (124 lb)      SpO2: 100% 97% 100% 98%       Cardiac Rhythm: ,   Cardiac  Cardiac Rhythm: Normal sinus rhythm    Pain level:      Is this patient confused?: No    Patient Report: Initial Complaint: Pt is a 55 yo female with hx of MI (and LAD stents) in 2015. Tonight pt was in bed, had midsternal CP and got up to the BR and doesn't remember what happened but woke up on the floor. Pt's  heard a \"thud\" and found pt on floor and called 911 and gave pt 325mg po ASA. Pt states the pain had resolved at that time.               Focused Assessment: Pt was pain free upon arrival to ED, denies nausea, dizziness or dyspnea. Pt is in a NSR/SB with frequent PVC's.   Tests Performed: EKG, CT of head and chest, CBC, CMP, Troponin.   Abnormal Results: None. Pt states concern because per the pt, with her previous MI, all pt's initial studies were negative but had a positive repeat troponin.   Treatments provided: Pt took po ASA at home. IV Heparin per protocol.      Family Comments:  at bedside.     OBS brochure/video discussed/provided to patient: N/A    ED Medications:   Medications   heparin infusion 25,000 units in 0.45% NaCl 250 mL (not administered)   heparin Loading Dose bolus dose from infusion pump 3,400 Units (not administered)   iopamidol " (ISOVUE-370) solution 80 mL (80 mLs Intravenous Given 1/29/17 0148)   sodium chloride 0.9 % for CT scan flush dose 80 mL (80 mLs Intravenous Given 1/29/17 0148)       Drips infusing?:  Yes- Heparin       ED NURSE PHONE NUMBER: *[phone]

## 2017-01-29 NOTE — PLAN OF CARE
Problem: Goal Outcome Summary  Goal: Goal Outcome Summary  Outcome: Improving  A/Ox4.  Denies chest pain or SOB.  States LUE arm discomfort to the touch.  Denies need for analgesic.  LS clear.  Tolerating diet.  Denies nausea.  States GI/ WDL.  Ambulates independently without difficulty.  Tele SR/SB.  Asymptomatic.  VSS except HR at times high 40's-50's.  Orthostatic BP's:  117/60, 46 lying;  108/59, 49 sitting;  98/65, 52 standing.  Asymptomatic.  Dr. Kapoor aware of results.  Pleasant, cooperative.  POC reviewed with pt and .  Questions/concerns answered.

## 2017-01-29 NOTE — IP AVS SNAPSHOT
"                  MRN:[id number]                      After Visit Summary   1/29/2017    Glenny Butts    MRN: [id number]           Thank you!     Thank you for choosing [place] for your care. Our goal is always to provide you with excellent care. Hearing back from our patients is one way we can continue to improve our services. Please take a few minutes to complete the written survey that you may receive in the mail after you visit with us. Thank you!        Patient Information     Date Of Birth          1960        About your hospital stay     You were admitted on:  January 29, 2017 You last received care in the:  [facility]    You were discharged on:  January 30, 2017        Reason for your hospital stay       You were hospitalized secondary to chest pain and a syncopal episode (\"passing out\").                  Who to Call     For medical emergencies, please call 911.  For non-urgent questions about your medical care, please call your primary care provider or clinic, [phone]          Attending Provider     Provider    Majo Mohamud MD    Mckinnon, William Zhao MD       Primary Care Provider Office Phone # Fax #    Sujit Galloway [phone]       [facility] [address] [place] [address]         When to contact your care team       Call your primary doctor/ cardiologist if you have any of the following:  increased shortness of breath, increased chest pain or rapid heart rate.                  After Care Instructions     Activity       Your activity upon discharge: activity as tolerated            Diet       Follow this diet upon discharge: Orders Placed This Encounter  Regular Diet Adult                    Follow-up Appointments     Follow Up and recommended labs and tests       Follow up with Dr. Camarillo , at (location with clinic name or city) [place] [facility], within one month  for hospital follow- up and follow up on " "results of 30 day event monitor. No follow up labs or test are needed.    Follow up with Dr. Camarillo's Nurse Practitioner in one week after discharge from the hospital.            Follow-up and recommended labs and tests       Follow up with primary care provider, Sujit Galloway, within 7-14 days for hospital follow- up.  The following labs/tests are recommended: CBC.                  Your next 10 appointments already scheduled     Feb 07, 2017 10:50 AM   Return Discharge with COLE Toledo CNP   [facility]    [address] [place] [address] [address]  [facility] [address]   [phone]            Feb 22, 2017  9:15 AM   Return Visit with Janes Camarillo MD   [facility]    [address] [place] [address] [address]  [facility] [address]   [phone]              Future tests that were ordered for you     Cardiac event monitor                 Pending Results     No orders found for last 2 day(s).            Statement of Approval     Ordered          01/30/17 1614  I have reviewed and agree with all the recommendations and orders detailed in this document.   EFFECTIVE NOW     Approved and electronically signed by:  Jeyson Kapoor MD             Admission Information        Provider Department Dept Phone    1/29/2017 William Mckinnon MD  [facility] [phone]      Your Vitals Were     Blood Pressure Temperature Respirations    101/67 mmHg 98.2  F (36.8  C) (Oral) 18    Height Weight BMI (Body Mass Index)    1.676 m (5' 6\") 55 kg (121 lb 4.1 oz) 19.58 kg/m2    Pulse Oximetry Last Period       94% (Approximate)       MyChart Information     Personally gives you secure access to your electronic health record. If you see a primary care provider, you can also send messages to your care team and make appointments. If you have questions, please call your primary care clinic.  If you " do not have a primary care provider, please call [phone] and they will assist you.        Care EveryWhere ID     This is your Care EveryWhere ID. This could be used by other organizations to access your [place] medical records  [id number]           Review of your medicines      CONTINUE these medicines which have NOT CHANGED        Dose / Directions    ASPIRIN PO        Dose:  81 mg   Take 81 mg by mouth daily   Refills:  0       atorvastatin 40 MG tablet   Commonly known as:  LIPITOR   Used for:  NSTEMI (non-ST elevated myocardial infarction) (H)        Dose:  40 mg   Take 1 tablet (40 mg) by mouth daily   Quantity:  90 tablet   Refills:  3       BENADRYL PO        Dose:  25 mg   Take 25 mg by mouth as needed   Refills:  0       doxylamine 25 MG Tabs tablet   Commonly known as:  UNISOM        Dose:  25 mg   Take 25 mg by mouth nightly as needed   Refills:  0       lisinopril 2.5 MG tablet   Commonly known as:  PRINIVIL/Zestril   Used for:  Coronary artery disease involving native coronary artery of native heart without angina pectoris        Dose:  2.5 mg   Take 1 tablet (2.5 mg) by mouth daily   Quantity:  90 tablet   Refills:  3       nitroglycerin 0.4 MG sublingual tablet   Commonly known as:  NITROSTAT   Used for:  Chest pain        Dose:  0.4 mg   Place 1 tablet (0.4 mg) under the tongue every 5 minutes as needed for chest pain If you are still having symptoms after 3 doses (15 minutes) call 911.   Quantity:  25 tablet   Refills:  0         STOP taking     metoprolol 25 MG 24 hr tablet   Commonly known as:  TOPROL-XL                    Protect others around you: Learn how to safely use, store and throw away your medicines at www.disposemymeds.org.             Medication List: This is a list of all your medications and when to take them. Check marks below indicate your daily home schedule. Keep this list as a reference.      Medications           Morning Afternoon Evening Bedtime As Needed    ASPIRIN PO    Take 81 mg by mouth daily   Last time this was given:  81 mg on 1/30/2017  8:21 AM                                atorvastatin 40 MG tablet   Commonly known as:  LIPITOR   Take 1 tablet (40 mg) by mouth daily   Last time this was given:  40 mg on 1/29/2017  7:45 PM                                BENADRYL PO   Take 25 mg by mouth as needed                                doxylamine 25 MG Tabs tablet   Commonly known as:  UNISOM   Take 25 mg by mouth nightly as needed                                lisinopril 2.5 MG tablet   Commonly known as:  PRINIVIL/Zestril   Take 1 tablet (2.5 mg) by mouth daily   Last time this was given:  2.5 mg on 1/30/2017  2:27 PM                                nitroglycerin 0.4 MG sublingual tablet   Commonly known as:  NITROSTAT   Place 1 tablet (0.4 mg) under the tongue every 5 minutes as needed for chest pain If you are still having symptoms after 3 doses (15 minutes) call 911.

## 2017-01-29 NOTE — PROGRESS NOTES
[facility]    Hospitalist Progress Note    Date of Service (when I saw the patient): 01/29/2017    Assessment and Plan  Glenny Butts is a 56 year old female who presents with epigastric discomfort at approximately midnight followed by stabbing chest discomfort and a subsequent episode of syncope when she was getting up to go to the bathroom. Patient endorses similar type of chest pain with prior myocardial infarction January 2015 though syncope was not present.    Atypical chest pain: Patient with onset of epigastric and subsequent stabbing chest pain while at rest approximately 11:50 PM 1/28/17. No nitroglycerin administered. Patient had a syncopal episode while ambulate into the bathroom and upon awakening, no chest discomfort. Was able to exercise on the elliptical earlier in the day with no discomfort.    -heparin on hold, troponins negative thus far  -Cardiology consulted; patient with a known history of abnormal stress test as well as known jailed second diagonal on October 2015 coronary angiography following that abnormal stress test.  -TTE evaluation for syncopal episode pending  -Cardiac telemetry with NSR/ borderline bradycardia  -Continue prior to admission lisinopril 2.5 mg daily, metoprolol 12.5 mg daily, atorvastatin 40 mg daily aspirin 81 mg daily in the setting of known coronary artery disease; patient did receive loading dose of aspirin in the emergency department.    Syncopal episode: patient with a non-prodromal syncopal episode after returning from the bedroom. Unclear if this represents a vagal episode in the setting of baseline bradycardia on low-dose metoprolol and onset of pain prior to episode. Alternatively, with known frequent PVCs and coronary artery disease, consider potential ventricular tachyarrhythmia resulting in syncope.    -tele with NSR/ deloris  -Magnesium, potassium replacement protocols in place  -TTE pending  -Orthostatic blood pressure with drop in pressure  "from lying to standing (~18) but otherwise negative  -neurosurgery consulted for tauma and have cleared  -sx do sound vasovagal (had just been on toilet and stood up, no prodrome but was \"clammy\" after); however sharp, stabbing chest pain concerning. As above.     Bradycardia: Mild. Consistent with appropriate beta-blockade in the setting of known jailed diagonal #2. Unclear if contributed to syncopal event  -Cardiac telemetry  -Continue metoprolol as above given reported chest discomfort which could represent anginal equivalent  - further guidance from cardiology re: the above    Coronary artery disease: mid LAD drug-eluting stent January 2015, known jailed diagonal #2 90% stenosis on repeat October 2015 angiogram, diffuse 25-35 percent disease otherwise  -Continue prior to admission lisinopril 2.5 mg daily, metoprolol 12.5 mg daily, atorvastatin 40 mg daily aspirin 81 mg daily in the setting of known coronary artery disease; patient did receive loading dose of aspirin in the emergency department.  -Cardiology consult as above given known abnormal stress test historically.    DVT Prophylaxis: ambulate    Code Status: Full Code    FEN (fluids, electrolytes and nutrition): regular diet for now  Discussed with Nursing.  DVT Prophylaxis: Pneumatic Compression Devices  Code Status: Full Code    Disposition: Expected discharge once cardiac evaluation done    Jeyson Kapoor MD  [phone] (P)  [phone] (C)  Text Page until 6 pm (after call answering service)    Interval History  Doing well. S/p echo, awaiting results. Denies cp/sob. No recurrence of sx. Describes sharp, stabbing chest pain and then going to bathroom. When stood up from toilet apparent LOC, awoke on floor. No prodrome but after syncope was 'clammy\".     -Data reviewed today: I reviewed all new labs and imaging results over the last 24 hours.     Physical Exam  Temp: 98.6  F (37  C) Temp src: Oral BP: 106/52 mmHg   Heart Rate: 49 Resp: 20 SpO2: 99 " % O2 Device: None (Room air)    Filed Vitals:    01/29/17 0106 01/29/17 0410 01/29/17 0521   Weight: 56.246 kg (124 lb) 53 kg (116 lb 13.5 oz) 53 kg (116 lb 13.5 oz)     Vital Signs with Ranges  Temp:  [98  F (36.7  C)-98.6  F (37  C)] 98.6  F (37  C)  Heart Rate:  [47-89] 49  Resp:  [5-20] 20  BP: ()/(51-70) 106/52 mmHg  SpO2:  [97 %-100 %] 99 %       Constitutional: Alert, oriented, no acute distress  Respiratory: Lungs clear to auscultation bilaterally, no wheezes, no crackles  Cardiovascular: Regular rate and rhythm, no murmurs  GI: Soft, non-tender, non-disteneded, good bowel sounds  Skin/Integumen: No erythema, cyanosis or edema  Other:      Medications       aspirin chewable tablet 81 mg  81 mg Oral Daily     atorvastatin  40 mg Oral Daily     lisinopril  2.5 mg Oral Daily     metoprolol  12.5 mg Oral Daily     sodium chloride (PF)  3 mL Intracatheter Q8H       Data    Recent Labs  Lab 01/29/17  1325 01/29/17  0900 01/29/17  0512 01/29/17  0111   WBC  --   --   --  6.4   HGB  --   --   --  13.1   MCV  --   --   --  93   PLT  --   --   --  140*   NA  --   --   --  141   POTASSIUM  --   --   --  4.0   CHLORIDE  --   --   --  104   CO2  --   --   --  31   BUN  --   --   --  16   CR  --   --   --  0.86   ANIONGAP  --   --   --  6   PARKER  --   --   --  8.5   GLC  --   --   --  105*   ALBUMIN  --   --   --  3.4   PROTTOTAL  --   --   --  6.7*   BILITOTAL  --   --   --  0.3   ALKPHOS  --   --   --  78   ALT  --   --   --  54*   AST  --   --   --  44   TROPI <0.015The 99th percentile for upper reference range is 0.045 ug/L.  Troponin values in the range of 0.045 - 0.120 ug/L may be associated with risks of adverse clinical events. <0.015The 99th percentile for upper reference range is 0.045 ug/L.  Troponin values in the range of 0.045 - 0.120 ug/L may be associated with risks of adverse clinical events. <0.015The 99th percentile for upper reference range is 0.045 ug/L.  Troponin values in the range of 0.045 -  0.120 ug/L may be associated with risks of adverse clinical events. <0.015The 99th percentile for upper reference range is 0.045 ug/L.  Troponin values in the range of 0.045 - 0.120 ug/L may be associated with risks of adverse clinical events.       Recent Results (from the past 24 hour(s))   Head CT w/o contrast    Narrative    CT HEAD WITHOUT CONTRAST   1/29/2017 1:53 AM     HISTORY: Syncope.    COMPARISON: None.    TECHNIQUE: Without intravenous contrast, helical sections were  acquired through the brain. Coronal reconstructions were generated.  Radiation dose for this scan was reduced using automated exposure  control, adjustment of the mA and/or kV according to the patient's  size, or iterative reconstruction technique.    FINDINGS: No intra-axial mass, mass effect or midline shift. Normal  gray-white matter differentiation. No visualized acute intra-axial  hemorrhage. The cerebral ventricles are normal in caliber. The basal  cisterns are patent. No extra-axial fluid collection. The visualized  portions of the paranasal sinuses and mastoid air cells are  unremarkable.      Impression    IMPRESSION: No evidence of acute intracranial abnormality.     PEDRO PABLO COSBY MD   Chest CT w IV contrast only, TRAUMA / DISSECTION    Narrative    CT CHEST WITH CONTRAST   1/29/2017 1:53 AM     HISTORY: Chest pain and syncope.    COMPARISON: 1/27/2015.    TECHNIQUE: Following the uneventful administration of 80mL Isovue-370  intravenous contrast, helical sections were acquired through the lungs  according to the pulmonary embolism protocol. Coronal reconstructions  were generated. Radiation dose for this scan was reduced using  automated exposure control, adjustment of the mA and/or kV according  to the patient's size, or iterative reconstruction technique.     FINDINGS: No visualized pulmonary embolus. The thoracic aorta is  normal in caliber without dissection. Atherosclerotic calcification in  the thoracic aorta and  coronary arteries.    The lungs are clear. No pleural or pericardial effusion. No enlarged  lymph nodes in the chest.    Scan through the upper abdomen is significant for prior  cholecystectomy.      Impression    IMPRESSION:   1. No visualized pulmonary embolus.  2. No evidence of active pulmonary disease.    PEDRO PABLO COSBY MD       .

## 2017-01-30 ENCOUNTER — APPOINTMENT (OUTPATIENT)
Dept: NUCLEAR MEDICINE | Facility: CLINIC | Age: 57
End: 2017-01-30
Attending: INTERNAL MEDICINE
Payer: COMMERCIAL

## 2017-01-30 VITALS
DIASTOLIC BLOOD PRESSURE: 67 MMHG | TEMPERATURE: 98.2 F | RESPIRATION RATE: 18 BRPM | WEIGHT: 121.25 LBS | BODY MASS INDEX: 19.49 KG/M2 | HEIGHT: 66 IN | OXYGEN SATURATION: 94 % | SYSTOLIC BLOOD PRESSURE: 101 MMHG

## 2017-01-30 LAB
ANION GAP SERPL CALCULATED.3IONS-SCNC: 8 MMOL/L (ref 3–14)
BUN SERPL-MCNC: 13 MG/DL (ref 7–30)
CALCIUM SERPL-MCNC: 8.3 MG/DL (ref 8.5–10.1)
CHLORIDE SERPL-SCNC: 110 MMOL/L (ref 94–109)
CO2 SERPL-SCNC: 23 MMOL/L (ref 20–32)
CREAT SERPL-MCNC: 0.67 MG/DL (ref 0.52–1.04)
ERYTHROCYTE [DISTWIDTH] IN BLOOD BY AUTOMATED COUNT: 12.8 % (ref 10–15)
GFR SERPL CREATININE-BSD FRML MDRD: ABNORMAL ML/MIN/1.7M2
GLUCOSE SERPL-MCNC: 88 MG/DL (ref 70–99)
HCT VFR BLD AUTO: 37.9 % (ref 35–47)
HGB BLD-MCNC: 12.8 G/DL (ref 11.7–15.7)
MCH RBC QN AUTO: 31.4 PG (ref 26.5–33)
MCHC RBC AUTO-ENTMCNC: 33.8 G/DL (ref 31.5–36.5)
MCV RBC AUTO: 93 FL (ref 78–100)
PLATELET # BLD AUTO: 134 10E9/L (ref 150–450)
POTASSIUM SERPL-SCNC: 4.2 MMOL/L (ref 3.4–5.3)
RBC # BLD AUTO: 4.07 10E12/L (ref 3.8–5.2)
SODIUM SERPL-SCNC: 141 MMOL/L (ref 133–144)
TROPONIN I SERPL-MCNC: NORMAL UG/L (ref 0–0.04)
WBC # BLD AUTO: 3.2 10E9/L (ref 4–11)

## 2017-01-30 PROCEDURE — 78452 HT MUSCLE IMAGE SPECT MULT: CPT

## 2017-01-30 PROCEDURE — 25000132 ZZH RX MED GY IP 250 OP 250 PS 637: Performed by: INTERNAL MEDICINE

## 2017-01-30 PROCEDURE — 93018 CV STRESS TEST I&R ONLY: CPT | Performed by: INTERNAL MEDICINE

## 2017-01-30 PROCEDURE — 78452 HT MUSCLE IMAGE SPECT MULT: CPT | Mod: 26 | Performed by: INTERNAL MEDICINE

## 2017-01-30 PROCEDURE — 99217 ZZC OBSERVATION CARE DISCHARGE: CPT | Performed by: INTERNAL MEDICINE

## 2017-01-30 PROCEDURE — 93016 CV STRESS TEST SUPVJ ONLY: CPT | Performed by: INTERNAL MEDICINE

## 2017-01-30 PROCEDURE — 84484 ASSAY OF TROPONIN QUANT: CPT | Performed by: INTERNAL MEDICINE

## 2017-01-30 PROCEDURE — 80048 BASIC METABOLIC PNL TOTAL CA: CPT | Performed by: INTERNAL MEDICINE

## 2017-01-30 PROCEDURE — 36415 COLL VENOUS BLD VENIPUNCTURE: CPT | Performed by: INTERNAL MEDICINE

## 2017-01-30 PROCEDURE — 93017 CV STRESS TEST TRACING ONLY: CPT | Performed by: REHABILITATION PRACTITIONER

## 2017-01-30 PROCEDURE — 25000125 ZZHC RX 250: Performed by: INTERNAL MEDICINE

## 2017-01-30 PROCEDURE — 85027 COMPLETE CBC AUTOMATED: CPT | Performed by: INTERNAL MEDICINE

## 2017-01-30 PROCEDURE — G0378 HOSPITAL OBSERVATION PER HR: HCPCS

## 2017-01-30 PROCEDURE — 93270 REMOTE 30 DAY ECG REV/REPORT: CPT | Performed by: INTERNAL MEDICINE

## 2017-01-30 PROCEDURE — 40000855 ZZH STATISTIC ECHO STRESS OR NM NPI

## 2017-01-30 PROCEDURE — 34300033 ZZH RX 343: Performed by: INTERNAL MEDICINE

## 2017-01-30 PROCEDURE — 99214 OFFICE O/P EST MOD 30 MIN: CPT | Mod: 25 | Performed by: INTERNAL MEDICINE

## 2017-01-30 PROCEDURE — A9502 TC99M TETROFOSMIN: HCPCS | Performed by: INTERNAL MEDICINE

## 2017-01-30 RX ORDER — ALBUTEROL SULFATE 90 UG/1
2 AEROSOL, METERED RESPIRATORY (INHALATION) EVERY 5 MIN PRN
Status: DISCONTINUED | OUTPATIENT
Start: 2017-01-30 | End: 2017-01-30

## 2017-01-30 RX ORDER — AMINOPHYLLINE 25 MG/ML
50-100 INJECTION, SOLUTION INTRAVENOUS
Status: DISCONTINUED | OUTPATIENT
Start: 2017-01-30 | End: 2017-01-30

## 2017-01-30 RX ORDER — REGADENOSON 0.08 MG/ML
0.4 INJECTION, SOLUTION INTRAVENOUS ONCE
Status: COMPLETED | OUTPATIENT
Start: 2017-01-30 | End: 2017-01-30

## 2017-01-30 RX ORDER — ACYCLOVIR 200 MG/1
0-1 CAPSULE ORAL
Status: DISCONTINUED | OUTPATIENT
Start: 2017-01-30 | End: 2017-01-30

## 2017-01-30 RX ADMIN — REGADENOSON 0.4 MG: 0.08 INJECTION, SOLUTION INTRAVENOUS at 12:35

## 2017-01-30 RX ADMIN — LISINOPRIL 2.5 MG: 2.5 TABLET ORAL at 14:27

## 2017-01-30 RX ADMIN — ASPIRIN 81 MG 81 MG: 81 TABLET ORAL at 08:21

## 2017-01-30 RX ADMIN — TETROFOSMIN 26.2 MCI.: 0.23 INJECTION, POWDER, LYOPHILIZED, FOR SOLUTION INTRAVENOUS at 12:39

## 2017-01-30 RX ADMIN — TETROFOSMIN 9.1 MCI.: 0.23 INJECTION, POWDER, LYOPHILIZED, FOR SOLUTION INTRAVENOUS at 10:15

## 2017-01-30 NOTE — PLAN OF CARE
Problem: Goal Outcome Summary  Goal: Goal Outcome Summary  Outcome: No Change  VSS, O2sats 99% on RA, AAO. Negative orthostatic BPs, improved from day shift. Denies SOB, dizziness. Tele SR c PVCs, occasionally bradycardic in 40s-50s, asymptomatic. D/c metoprolol today per cardiology d/t low HR. Denies pain; mild discomfort in left shoulder with movement. Will be NPO at midnight for possible nuc stress test tomorrow.

## 2017-01-30 NOTE — PROGRESS NOTES
Lexiscan Stress: Pt tolerated well. VSS. Pt denied chest pain or pressure prior to injection. After injection pt reported only a slight headache. Occ PVCs noted prior to test. Once HR increased - PVC became very frequent in couplets.     1248 Pt transferred back to Rm 260-1 per w/c. Detailed report called to Sam Mc RN.

## 2017-01-30 NOTE — PROGRESS NOTES
Changed exercise nuclear stress test to Lexiscan nuclear stress test due to increased ventricular ectopy including couplets with exercise.     Jose Covington, MS

## 2017-01-30 NOTE — DISCHARGE SUMMARY
[facility]    Discharge Summary  Hospitalist    Date of Admission:  1/29/2017  Date of Discharge:  1/30/2017  Discharging Provider: Jeyson Kapoor MD  Date of Service (when I saw the patient): 01/30/2017    Discharge Diagnoses   Chest pain, atypical  Syncope  Bradycardia  Coronary artery disease    History of Present Illness  Glenny Butts is a 56 year old female who presents with epigastric discomfort at approximately midnight followed by stabbing chest discomfort and a subsequent episode of syncope when she was getting up to go to the bathroom. Patient endorses similar type of chest pain with prior myocardial infarction January 2015 though syncope was not present.    Hospital Course  Glenny Butts was admitted on 1/29/2017.  The following problems were addressed during her hospitalization:    Chest pain, atypical  Mrs. Butts presented after having onset of epigastric and stabbing chest pain at rest. After which she had a syncopal episode after getting up from the toilet. Prior had been exercising without issue on elliptical runner. Troponins were checked and negative. Lexiscan stress test was done and was negative for inducible ischemia. EF during study was noted to be 69%. She will follow up with her primary cardiologist, Dr. Camarillo, within a month.     Syncope  Etiology unclear. Telemetry inpatient with NSR with occ PVC. Had frequent PVC's during stress test but this may have been 2/2 holding of her metoprolol. Discussed with Dr. Camarillo, will hold metoprolol. Pt will get a 30 day event monitor. She should have follow up with Dr. Camarillo's nurse practitioner in one week and with Dr. Camarillo in one month. Instructed to report any sx to her primary MD or cardiology clinic.     Bradycardia  Noted to have heart rates in the 40's to 50's systolic in the hospital. Per Dr. Camarillo, concern that metoprolol may be worsening bradycardia. Will hold metoprolol and have 30 day event monitor as  "above. Will have follow up as above.     Coronary artery disease  With known h/o CAD with mid LAD drug-eluting stent January 2015, known jailed diagonal #2 90% stenosis on repeat October 2015 angiogram, diffuse 25-35 percent disease otherwise. As above, no evidence of ACS or inducible ischemia on stress test this admission.     Jeyson Kapoor M.D.  Hospitalist  Pager [phone]    Significant Results and Procedures  CT chest with contrast  Head CT without contrast  Lexiscan stress test  Echocardiogram with normal EF, no RWMA    Pending Results  None    Code Status  Full Code       Primary Care Physician  Sujit Galloway    Physical Exam  Temp: 98.2  F (36.8  C) Temp src: Oral BP: 101/67 mmHg   Heart Rate: 54 Resp: 18 SpO2: 94 % O2 Device: None (Room air)    Filed Vitals:    01/29/17 0410 01/29/17 0521 01/30/17 0500   Weight: 53 kg (116 lb 13.5 oz) 53 kg (116 lb 13.5 oz) 55 kg (121 lb 4.1 oz)     Vital Signs with Ranges  Temp:  [97.6  F (36.4  C)-98.5  F (36.9  C)] 98.2  F (36.8  C)  Heart Rate:  [] 54  Resp:  [16-20] 18  BP: (101-134)/(52-89) 101/67 mmHg  SpO2:  [94 %-100 %] 94 %       Constitutional: Alert, oriented, no acute distress  Respiratory: Lungs clear to auscultation bilaterally, no wheezes, no crackles  Cardiovascular: Regular rate and rhythm, no murmurs  GI: Soft, non-tender, non-disteneded, good bowel sounds  Skin/Integumen: No erythema, cyanosis or edema  Other:      Discharge Disposition  Discharged to home  Condition at discharge: Stable    Consultations This Hospital Stay  PHARMACY TO DOSE HEPARIN  CARDIOLOGY IP CONSULT  NEUROSURGERY IP CONSULT    Time Spent on This Encounter  I, Jeyson Kapoor, personally saw the patient today and spent greater than 30 minutes discharging this patient.    Discharge Orders    Cardiac event monitor     Reason for your hospital stay   You were hospitalized secondary to chest pain and a syncopal episode (\"passing out\").     Follow-up and recommended " labs and tests   Follow up with primary care provider, Sujit Galloway, within 7-14 days for hospital follow- up.  The following labs/tests are recommended: CBC.     Activity   Your activity upon discharge: activity as tolerated     Follow Up and recommended labs and tests   Follow up with Dr. Camarillo , at (location with clinic name or city) [place], within one month  for hospital follow- up and follow up on results of 30 day event monitor. No follow up labs or test are needed.    Follow up with Dr. Camarillo's Nurse Practitioner in one week after discharge from the hospital.     When to contact your care team   Call your primary doctor/ cardiologist if you have any of the following:  increased shortness of breath, increased chest pain or rapid heart rate.     Full Code     Diet   Follow this diet upon discharge: Orders Placed This Encounter  Regular Diet Adult         Discharge Medications  Current Discharge Medication List      CONTINUE these medications which have NOT CHANGED    Details   atorvastatin (LIPITOR) 40 MG tablet Take 1 tablet (40 mg) by mouth daily  Qty: 90 tablet, Refills: 3    Associated Diagnoses: NSTEMI (non-ST elevated myocardial infarction) (H)      doxylamine (UNISOM) 25 MG TABS Take 25 mg by mouth nightly as needed      lisinopril (PRINIVIL,ZESTRIL) 2.5 MG tablet Take 1 tablet (2.5 mg) by mouth daily  Qty: 90 tablet, Refills: 3    Associated Diagnoses: Coronary artery disease involving native coronary artery of native heart without angina pectoris      nitroglycerin (NITROSTAT) 0.4 MG SL tablet Place 1 tablet (0.4 mg) under the tongue every 5 minutes as needed for chest pain If you are still having symptoms after 3 doses (15 minutes) call 911.  Qty: 25 tablet, Refills: 0    Associated Diagnoses: Chest pain      ASPIRIN PO Take 81 mg by mouth daily      DiphenhydrAMINE HCl (BENADRYL PO) Take 25 mg by mouth as needed          STOP taking these medications       metoprolol (TOPROL-XL)  25 MG 24 hr tablet Comments:   Reason for Stopping:             Allergies  Allergies   Allergen Reactions     Dust Mites      Grass      Trees      Data  Most Recent 3 CBC's:  Recent Labs   Lab Test  01/30/17   0535  01/29/17   0111  10/05/15   0720   WBC  3.2*  6.4  4.8   HGB  12.8  13.1  14.0   MCV  93  93  88   PLT  134*  140*  193      Most Recent 3 BMP's:  Recent Labs   Lab Test  01/30/17   0535  01/29/17   0111  10/05/15   0720   NA  141  141  140   POTASSIUM  4.2  4.0  4.2   CHLORIDE  110*  104  105   CO2  23  31  31   BUN  13  16  21   CR  0.67  0.86  0.79   ANIONGAP  8  6  4   PARKER  8.3*  8.5  9.0   GLC  88  105*  93     Most Recent 2 LFT's:  Recent Labs   Lab Test  01/29/17   0111  01/28/15   0542   AST  44  159*   ALT  54*  42   ALKPHOS  78   --    BILITOTAL  0.3   --      Most Recent INR's and Anticoagulation Dosing History:        Anticoagulation Dose History     Recent Dosing and Labs Latest Ref Rng 10/5/2015    INR 0.86 - 1.14 0.95        Most Recent 3 Troponin's:  Recent Labs   Lab Test  01/30/17   0535  01/29/17   1325  01/29/17   0900   TROPI  <0.015  The 99th percentile for upper reference range is 0.045 ug/L.  Troponin values in   the range of 0.045 - 0.120 ug/L may be associated with risks of adverse   clinical events.    <0.015  The 99th percentile for upper reference range is 0.045 ug/L.  Troponin values in   the range of 0.045 - 0.120 ug/L may be associated with risks of adverse   clinical events.    <0.015  The 99th percentile for upper reference range is 0.045 ug/L.  Troponin values in   the range of 0.045 - 0.120 ug/L may be associated with risks of adverse   clinical events.       Most Recent Cholesterol Panel:  Recent Labs   Lab Test  10/23/15   0754   CHOL  121   LDL  43*   HDL  69   TRIG  46     Most Recent 6 Bacteria Isolates From Any Culture (See EPIC Reports for Culture Details):No lab results found.  Most Recent TSH, T4 and A1c Labs:No lab results found.  Results for orders placed  or performed during the hospital encounter of 01/29/17   Chest CT w IV contrast only, TRAUMA / DISSECTION    Narrative    CT CHEST WITH CONTRAST   1/29/2017 1:53 AM     HISTORY: Chest pain and syncope.    COMPARISON: 1/27/2015.    TECHNIQUE: Following the uneventful administration of 80mL Isovue-370  intravenous contrast, helical sections were acquired through the lungs  according to the pulmonary embolism protocol. Coronal reconstructions  were generated. Radiation dose for this scan was reduced using  automated exposure control, adjustment of the mA and/or kV according  to the patient's size, or iterative reconstruction technique.     FINDINGS: No visualized pulmonary embolus. The thoracic aorta is  normal in caliber without dissection. Atherosclerotic calcification in  the thoracic aorta and coronary arteries.    The lungs are clear. No pleural or pericardial effusion. No enlarged  lymph nodes in the chest.    Scan through the upper abdomen is significant for prior  cholecystectomy.      Impression    IMPRESSION:   1. No visualized pulmonary embolus.  2. No evidence of active pulmonary disease.    PEDRO PABLO COSBY MD   Head CT w/o contrast    Narrative    CT HEAD WITHOUT CONTRAST   1/29/2017 1:53 AM     HISTORY: Syncope.    COMPARISON: None.    TECHNIQUE: Without intravenous contrast, helical sections were  acquired through the brain. Coronal reconstructions were generated.  Radiation dose for this scan was reduced using automated exposure  control, adjustment of the mA and/or kV according to the patient's  size, or iterative reconstruction technique.    FINDINGS: No intra-axial mass, mass effect or midline shift. Normal  gray-white matter differentiation. No visualized acute intra-axial  hemorrhage. The cerebral ventricles are normal in caliber. The basal  cisterns are patent. No extra-axial fluid collection. The visualized  portions of the paranasal sinuses and mastoid air cells are  unremarkable.       Impression    IMPRESSION: No evidence of acute intracranial abnormality.     PEDRO PABLO COSBY MD   NM MPI w Lexiscan    Narrative    GATED MYOCARDIAL PERFUSION SCINTIGRAPHY WITH INTRAVENOUS PHARMACOLOGIC  VASODILATATION LEXISCAN -ONE DAY STUDY     1/30/2017 1:58 PM  SEBASTIEN SAMUEL  56 years  Female  1960.    Indication/Clinical History: CAD    Impression  1.  Myocardial perfusion imaging using single isotope technique  demonstrated fixed apical and distal anteroseptal wall photopenic  defect that is likely consistent with attenuation artifact however a  small nontransmural apical infarct cannot be entirely excluded. No  ischemia appreciated in present study.   2. Gated images demonstrated no regional wall motion abnormalities.   The left ventricular systolic function is normal with LVEF of 69% with  stress.  3. No prior study for comparison.    Procedure  Pharmacologic stress testing was performed with Lexiscan at a rate of  0.08 mg/ml rapid bolus injection, for 15 seconds, 0.4 mg/5ml  intravenously. Low-level exercise was performed along with the  vasodilator infusion.  The heart rate was 82 at baseline and logan to  137 beats per minute during the Lexiscan infusion. The rest blood  pressure was 134/72 mmHg and was 132/80 mm Hg during Lexiscan  infusion. The patient experienced no chest pain  during the test.    Myocardial perfusion imaging was performed at rest, approximately 45  minutes after the injection intravenously of 9.1 mCi of Tc-99m  Myoview. At peak pharmacologic effect, 10-20 seconds after Lexiscan,   the patient was injected intravenously with 26.2 mCi of  Tc-99m  Myoview. The post-stress tomographic imaging was performed  approximately 60 minutes after stress.    EKG Findings  The resting EKG demonstrated sinus rhythm, frequent PVCs. The stress  EKG demonstrated 1 mm lateral precordial, 1 to 1.5 mm inferior leads  ST depression, frequent PVCs, PVC couplet.    Tomographic Findings  Overall, the  study quality is adequate . On both stress and rest  images there is mild intensity distal anteroseptal, apical wall  photopenic defect. The defect appears slightly worse on rest images in  distal anteroseptal area compared to stress images. Gated images  demonstrated no regional wall motion abnormalities. The left  ventricular ejection fraction was calculated to be 69% with stress.  TID was visually absent.    BILL REYES MD

## 2017-01-30 NOTE — CONSULTS
DATE OF CONSULTATION:  01/29/2017.       REASON FOR CONSULTATION:  Syncope and chest pain.      HISTORY OF PRESENT ILLNESS:  Ms. Glenny Butts is a pleasant 56-year-old lady with a history of hyperlipidemia and non-STEMI in 2015 (status post PCI to LAD) who was admitted with an episode of chest pain followed by syncope.  Cardiology was consulted to help with management.      The patient informs that she was in her usual state of health.  She believes she ate too much at dinner and went to bed feeling her stomach heavy.  She woke up in the middle of the night with a stabbing chest pain, severe in intensity without radiation.  The pain lasted for about 5-10 minutes.  She decided to go to the bathroom and the next thing she knew she was on the floor.      Her  found her unconscious and EMS was called.  At arrival, she was back to feeling normal and chest pain was resolved.  She describes feeling very cold and having tremors when she recovered conscience.     She denies any other symptoms such as lightheadedness or shortness of breath.      Troponins were negative x3.  EKG on admission showed normal sinus rhythm (sinus bradycardia at 52 beats per minute) and no significant ST changes were noticed.  Echocardiogram obtained today revealed EF of 55% -60%.  No significant aortic valve stenosis.      Of note, in 09/2015, patient underwent a stress echo for atypical chest pain.  At that time, the stress echo was abnormal with anterior wall ischemia which prompted coronary  catheterization.  Cath revealed previously placed stent was widely patent.  There was a 90% second diagonal lesion (resulted from the LAD stenting) and minimal disease in the other vessels (35% eccentric left main stenosis, mild RCA and circ disease).      ASSESSMENT AND PLAN:  An episode of syncope preceded by chest pain.  The chest pain that she presented with was somewhat different than her initial presentation with non-ST-segment myocardial  "infarction.  However, CAD needs to be excluded.  Since she had a positive stress echo in the past, I recommend a nuclear stress test.    The syncopal episode was also concerning as it did not have much prodromes.  Echocardiogram revealed normal cardiac function.  Telemetry has been negative so far.     If stress test is negative,  I recommend a 30-day event monitor and following up with me in a month.  We will consider a loop recorder placement if 30-day event monitor is negative.        PHYSICAL EXAM:  Vitals: /52 mmHg  Temp(Src) 98.4  F (36.9  C) (Oral)  Resp 16  Ht 1.676 m (5' 6\")  Wt 55 kg (121 lb 4.1 oz)  BMI 19.58 kg/m2  SpO2 98%  LMP  (Approximate)    Constitutional:  AAO x3.  Pt is in NAD.  HEAD: normocephalic.  SKIN: Skin normal color, texture and turgor with no lesions or eruptions.  Eyes: PERRL, EOMI.  ENT:  Supple, normal JVP. No lymphadenopathy or thyroid enlargement.  Chest:  CTAB.  Cardiac:   RRR, normal  S1 and S2.  No murmurs rubs or gallop.    Abdomen:  Normal BS.  Soft, non-tender and non-distended.  No rebound or guarding.    Extremities:  Pedious pulses palpable B/L.  No LE edema noticed.   Neurological: Strength and sensation grossly symmetric and intact throughout.       CURRENT MEDICATIONS:  No current outpatient prescriptions on file.       ALLERGIES     Allergies   Allergen Reactions     Dust Mites      Grass      Trees        PAST MEDICAL HISTORY:  Past Medical History   Diagnosis Date     Hyperlipidemia      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      Stress incontinence      CAD (coronary artery disease) 2/15     Stent to LAD 01/2015     NSTEMI (non-ST elevated myocardial infarction) (H) 2/15     Ischemic cardiomyopathy      S/P coronary angiogram 10-5-2015     patent prev placed stent-aggressive medical management       PAST SURGICAL HISTORY:  Past Surgical History   Procedure Laterality Date     Colonoscopy       Cholecystectomy       C dexa, bone density, axial skel   "     [place] teeth       Hysterectomy vaginal N/A 12/19/2014     Procedure: HYSTERECTOMY VAGINAL;  Surgeon: Aarti Ramirez MD;  Location: [facility]     Heart cath, angioplasty  1/27/15      mid LAD-2.75 X 28 mm Promus premier FAM        FAMILY HISTORY:  Family History   Problem Relation Age of Onset     Dementia Mother      Myocardial Infarction Father 34     MI     Hypertension Brother      Hypertension Brother        SOCIAL HISTORY:  Social History     Social History     Marital Status:      Spouse Name: N/A     Number of Children: N/A     Years of Education: N/A     Social History Main Topics     Smoking status: Never Smoker      Smokeless tobacco: Never Used     Alcohol Use: Yes      Comment: 4 drinks per month     Drug Use: No     Sexual Activity: Not Asked     Other Topics Concern     Caffeine Concern No     cup of tea, no coffee     Sleep Concern Yes     takes OTC med     Stress Concern No     Weight Concern No     Special Diet Yes     LOW FAT, low sugar     Exercise Yes      4 times week, brisk walking treadmill, elyptical     Seat Belt Yes     Parent/Sibling W/ Cabg, Mi Or Angioplasty Before 65f 55m? Yes     Social History Narrative       Review of Systems:  Complete review of system is otherwise negative with the exception of what was described above.     Recent Lab Results:  LIPID RESULTS:  Lab Results   Component Value Date    CHOL 121 10/23/2015    HDL 69 10/23/2015    LDL 43* 10/23/2015    TRIG 46 10/23/2015    CHOLHDLRATIO 1.8 10/23/2015       LIVER ENZYME RESULTS:  Lab Results   Component Value Date    AST 44 01/29/2017    ALT 54* 01/29/2017       CBC RESULTS:  Lab Results   Component Value Date    WBC 3.2* 01/30/2017    RBC 4.07 01/30/2017    HGB 12.8 01/30/2017    HCT 37.9 01/30/2017    MCV 93 01/30/2017    MCH 31.4 01/30/2017    MCHC 33.8 01/30/2017    RDW 12.8 01/30/2017    * 01/30/2017       BMP RESULTS:  Lab Results   Component Value Date     01/30/2017    POTASSIUM 4.2  2017    CHLORIDE 110* 2017    CO2 23 2017    ANIONGAP 8 2017    GLC 88 2017    BUN 13 2017    CR 0.67 2017    GFRESTIMATED >90  Non  GFR Calc   2017    GFRESTBLACK >90   GFR Calc   2017    PARKER 8.3* 2017        A1C RESULTS:  No results found for: A1C    INR RESULTS:  Lab Results   Component Value Date    INR 0.95 10/05/2015            RONEL LAWS MD             D: 2017 17:03   T: 2017 17:24   MT: MARY ANN      Name:     SEBASTIEN SAMUEL   MRN:      [id number]        Account:       [id number]   :      1960           Consult Date:  2017      Document: [id number]

## 2017-01-30 NOTE — PROGRESS NOTES
The following appointments have been made for this patient. The pt prefers to make her own appointment with Dr. Sujit Galloway in 7-14 days.     Your next 10 appointments already scheduled      Feb 07, 2017 10:50 AM   Return Discharge with COLE Toledo CNP   [facility] [place] ([facility])    [address] [place] [id number]   [phone]              Feb 22, 2017  9:15 AM   Return Visit with Janes Camarillo MD   [facility] [place] ([facility])    [address] [place], [place] [id number]  [facility] [address]   [phone]

## 2017-01-30 NOTE — PLAN OF CARE
Alert and oriented x4. VSS. Denies pain. Tele SB with PAC's. Up independently. Stress test today, waiting on results to be red. Plan to possibly be discharged today, will continue to monitor.

## 2017-01-30 NOTE — PLAN OF CARE
Problem: Goal Outcome Summary  Goal: Goal Outcome Summary  Outcome: No Change  A/O, VSS ex deloris and soft bp at times, up SBA/independent, NPO since midnight, BS, flatus, orthostatic BPs and heart rates (lyin/49, hr 46. Sit: 89/55, hr 52. Stand: 111 62, hr 55). Tele NSR deloris. Stress test planned this AM.

## 2017-01-31 ENCOUNTER — CARE COORDINATION (OUTPATIENT)
Dept: CARDIOLOGY | Facility: CLINIC | Age: 57
End: 2017-01-31

## 2017-01-31 ENCOUNTER — CARE COORDINATION (OUTPATIENT)
Dept: CARE COORDINATION | Facility: CLINIC | Age: 57
End: 2017-01-31

## 2017-01-31 NOTE — PROGRESS NOTES
"Cardiology Progress Note  2017    Glenny Butts  [id number]    Subjective:  No further CP ovenight.  No syncope or presyncope.  BB has been held.    The patients lexiscan showed the followin.  Myocardial perfusion imaging using single isotope technique  demonstrated fixed apical and distal anteroseptal wall photopenic  defect that is likely consistent with attenuation artifact however a  small nontransmural apical infarct cannot be entirely excluded. No  ischemia appreciated in present study.    2. Gated images demonstrated no regional wall motion abnormalities.    The left ventricular systolic function is normal with LVEF of 69% with  stress.      No intake or output data in the 24 hours ending 17    Wt Readings from Last 4 Encounters:   17 55 kg (121 lb 4.1 oz)   16 56.11 kg (123 lb 11.2 oz)   16 55.792 kg (123 lb)   16 58.968 kg (130 lb)       Past Medical History   Diagnosis Date     Hyperlipidemia      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      Stress incontinence      CAD (coronary artery disease) 2/15     Stent to LAD 2015     NSTEMI (non-ST elevated myocardial infarction) (H) 2/15     Ischemic cardiomyopathy      S/P coronary angiogram 10-5-2015     patent prev placed stent-aggressive medical management             PE:  NAD  /67 mmHg  Temp(Src) 98.2  F (36.8  C) (Oral)  Resp 18  Ht 1.676 m (5' 6\")  Wt 55 kg (121 lb 4.1 oz)  BMI 19.58 kg/m2  SpO2 94%  LMP  (Approximate)  No JVD  Regular rate and rhythm without murmurs, rubs, or gallops  Clear without crackles or wheezing  Soft, NT  Alert and oriented  No LE edema  Affect normal    Last Basic Metabolic Panel:  NA      141   2017   POTASSIUM      4.2   2017  CHLORIDE      110   2017  PARKER      8.3   2017  CO2       23   2017  BUN       13   2017  CR     0.67   2017  GLC       88   2017    Lab Results   Component Value Date    TROPI  2017     " <0.015  The 99th percentile for upper reference range is 0.045 ug/L.  Troponin values in   the range of 0.045 - 0.120 ug/L may be associated with risks of adverse   clinical events.      TROPI  01/29/2017     <0.015  The 99th percentile for upper reference range is 0.045 ug/L.  Troponin values in   the range of 0.045 - 0.120 ug/L may be associated with risks of adverse   clinical events.      TROPI  01/29/2017     <0.015  The 99th percentile for upper reference range is 0.045 ug/L.  Troponin values in   the range of 0.045 - 0.120 ug/L may be associated with risks of adverse   clinical events.      TROPI  01/29/2017     <0.015  The 99th percentile for upper reference range is 0.045 ug/L.  Troponin values in   the range of 0.045 - 0.120 ug/L may be associated with risks of adverse   clinical events.      TROPI  01/29/2017     <0.015  The 99th percentile for upper reference range is 0.045 ug/L.  Troponin values in   the range of 0.045 - 0.120 ug/L may be associated with risks of adverse   clinical events.         WBC      3.2   1/30/2017  RBC     4.07   1/30/2017  HGB     12.8   1/30/2017  HCT     37.9   1/30/2017  No components found with this name: mct  MCV       93   1/30/2017  MCH     31.4   1/30/2017  MCHC     33.8   1/30/2017  RDW     12.8   1/30/2017  PLT      134   1/30/2017    Assessment/Plan:  56 year old patient of mine from the clinic is admitted with CP and syncope.  Her BB has been held due to low HR's and BP's.  Her stress test did not show ischemia and we have discharged the patient with an outpatient Holter monitor.  There was some PVC's noted on the stress test but this was likely a result of her BB being held.    Janes Camarillo MD

## 2017-01-31 NOTE — PROGRESS NOTES
Called patient and left  to discuss any post hospital d/c questions she may have, review medication changes, and confirm f/u appts. RN advised patient in  that she has an apt scheduled on 2/7/17 with MONICA Trisha Berry. Patient advised in  to call clinic with any cardiac related questions or concerns prior to his apt on 2/7/17.

## 2017-02-01 ENCOUNTER — DOCUMENTATION ONLY (OUTPATIENT)
Dept: CARDIOLOGY | Facility: CLINIC | Age: 57
End: 2017-02-01

## 2017-02-02 NOTE — PROGRESS NOTES
cardionet strip 2/1/17 showing sinus bradycardia 37-38 BPM with a 2.0 sec pause noted, strips tagged as 03:48 and 04:42 CST. Strips placed in event folder.

## 2017-02-03 NOTE — PROGRESS NOTES
cardionet strip 2/2/17 showing sinus bradycardia @ 37 BPM at 02:50 CST. Strip placed in event folder

## 2017-02-06 NOTE — PROGRESS NOTES
cardionet strip 2/3/17 showing sinu bradycardia with PVC @ 42 BPM and sinus deloris @ 38 BPM noted.  Will update Dr. Camarillo    cardionet strip 2/4/17 showing junctional rhythm @ 50 BPM and sinus deloris @ 38 BPM. Attempted to contact patient to see if she has had any more episodes, left message for patient to call back

## 2017-02-06 NOTE — PROGRESS NOTES
Spoke with patient, she states other than a few episodes of momentary dizziness she has felt well during waking hours. She states the sensation is over so quickly she cannot hit the button fast enough to catch it.

## 2017-02-06 NOTE — PROGRESS NOTES
Call from cardionet that a weekend strip showed sinus bradycardia @ 39 BPM. They will fax a strip today

## 2017-02-07 NOTE — PROGRESS NOTES
cardionet strip 2/5/17 showing sinus deloris @ 39 BPM @ 12:15 CST and 36 BPM @ 01:16 CST.    cardionet strip 2/6/17 showing junctional rhythm into sinus bradycardia 34-41 BPM at 01:11 CST and 03:27 CST. Sinus deloris with 3-beat ventricular run @ 40 BPM @ 23:54 CST.

## 2017-02-08 ENCOUNTER — TELEPHONE (OUTPATIENT)
Dept: CARDIOLOGY | Facility: CLINIC | Age: 57
End: 2017-02-08

## 2017-02-08 DIAGNOSIS — R00.1 SINUS BRADYCARDIA: Primary | ICD-10-CM

## 2017-02-08 NOTE — TELEPHONE ENCOUNTER
"Call from Dr. Camarillo \"have patient see EP to discuss event monitor strips\"  Attempted to contact patient with recommendation, left message for patient to call back  Order placed for EP consult    Patient scheduled for 2/10/17 with Dr. Weinstein, patient asked to cancel today's MONICA visit for discharge visit since she is seeing Dr. Weinstein on Friday.  "

## 2017-02-09 NOTE — PROGRESS NOTES
Cardionet strip 2-8-17 noted - accelerated junctional rhythm into Sinus Bradycardia. Rate 36 @ 23:17 PM.   Strip placed in folder.

## 2017-02-10 ENCOUNTER — OFFICE VISIT (OUTPATIENT)
Dept: CARDIOLOGY | Facility: CLINIC | Age: 57
End: 2017-02-10
Attending: INTERNAL MEDICINE
Payer: COMMERCIAL

## 2017-02-10 VITALS
DIASTOLIC BLOOD PRESSURE: 62 MMHG | HEART RATE: 60 BPM | HEIGHT: 66 IN | WEIGHT: 122 LBS | SYSTOLIC BLOOD PRESSURE: 118 MMHG | BODY MASS INDEX: 19.61 KG/M2

## 2017-02-10 DIAGNOSIS — R00.1 SINUS BRADYCARDIA: ICD-10-CM

## 2017-02-10 PROCEDURE — 99214 OFFICE O/P EST MOD 30 MIN: CPT | Performed by: INTERNAL MEDICINE

## 2017-02-10 NOTE — PROGRESS NOTES
HPI and Plan:   See dictation    No orders of the defined types were placed in this encounter.       No orders of the defined types were placed in this encounter.       There are no discontinued medications.      Encounter Diagnosis   Name Primary?     Sinus bradycardia        CURRENT MEDICATIONS:  Current Outpatient Prescriptions   Medication Sig Dispense Refill     atorvastatin (LIPITOR) 40 MG tablet Take 1 tablet (40 mg) by mouth daily 90 tablet 3     doxylamine (UNISOM) 25 MG TABS Take 25 mg by mouth nightly as needed       lisinopril (PRINIVIL,ZESTRIL) 2.5 MG tablet Take 1 tablet (2.5 mg) by mouth daily 90 tablet 3     nitroglycerin (NITROSTAT) 0.4 MG SL tablet Place 1 tablet (0.4 mg) under the tongue every 5 minutes as needed for chest pain If you are still having symptoms after 3 doses (15 minutes) call 911. 25 tablet 0     ASPIRIN PO Take 81 mg by mouth daily       DiphenhydrAMINE HCl (BENADRYL PO) Take 25 mg by mouth as needed          ALLERGIES     Allergies   Allergen Reactions     Dust Mites      Grass      Trees        PAST MEDICAL HISTORY:  Past Medical History   Diagnosis Date     Hyperlipidemia      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      Stress incontinence      CAD (coronary artery disease) 2/15     Stent to LAD 01/2015     NSTEMI (non-ST elevated myocardial infarction) (H) 2/15     Ischemic cardiomyopathy      S/P coronary angiogram 10-5-2015     patent prev placed stent-aggressive medical management     Syncope        PAST SURGICAL HISTORY:  Past Surgical History   Procedure Laterality Date     Colonoscopy       Cholecystectomy       C dexa, bone density, axial skel       [place] teeth       Hysterectomy vaginal N/A 12/19/2014     Procedure: HYSTERECTOMY VAGINAL;  Surgeon: Aarti Ramirez MD;  Location:  OR     Heart cath, angioplasty  1/27/15      mid LAD-2.75 X 28 mm Promus premier FAM        FAMILY HISTORY:  Family History   Problem Relation Age of Onset     Dementia Mother       "Myocardial Infarction Father 34     MI     Hypertension Brother      Hypertension Brother        SOCIAL HISTORY:  Social History     Social History     Marital Status:      Spouse Name: N/A     Number of Children: N/A     Years of Education: N/A     Social History Main Topics     Smoking status: Never Smoker      Smokeless tobacco: Never Used     Alcohol Use: Yes      Comment: 4 drinks per month     Drug Use: No     Sexual Activity: Not Asked     Other Topics Concern     Caffeine Concern No     cup of tea, no coffee     Sleep Concern Yes     takes OTC med     Stress Concern No     Weight Concern No     Special Diet Yes     LOW FAT, low sugar     Exercise Yes      4 times week, brisk walking treadmill, elyptical     Seat Belt Yes     Parent/Sibling W/ Cabg, Mi Or Angioplasty Before 65f 55m? Yes     Social History Narrative       Review of Systems:  Skin:  Negative       Eyes:  Positive for contacts    ENT:  Negative      Respiratory:  Positive for cough     Cardiovascular:  Negative;chest pain;exercise intolerance;lightheadedness;dizziness;fatigue Positive for;edema;palpitations palpitation one episode since last OV, edema, occ at the end of the day  Gastroenterology: Negative      Genitourinary:  Negative      Musculoskeletal:  Negative      Neurologic:  Negative      Psychiatric:  Positive for sleep disturbances    Heme/Lymph/Imm:  Negative      Endocrine:  Negative        Physical Exam:  Vitals: /62 mmHg  Pulse 60  Ht 1.676 m (5' 6\")  Wt 55.339 kg (122 lb)  BMI 19.70 kg/m2  LMP  (Approximate)    Constitutional:  cooperative, alert and oriented, well developed, well nourished, in no acute distress        Skin:  warm and dry to the touch, no apparent skin lesions or masses noted        Head:  normocephalic, no masses or lesions        Eyes:  pupils equal and round, conjunctivae and lids unremarkable, sclera white, no xanthalasma, EOMS intact, no nystagmus        ENT:  no pallor or cyanosis, " dentition good        Neck:  carotid pulses are full and equal bilaterally, JVP normal, no carotid bruit, no thyromegaly        Chest:  normal breath sounds, clear to auscultation, normal A-P diameter, normal symmetry, normal respiratory excursion, no use of accessory muscles          Cardiac: regular rhythm, normal S1/S2, no S3 or S4, apical impulse not displaced, no murmurs, gallops or rubs                  Abdomen:  abdomen soft, non-tender, BS normoactive, no mass, no HSM, no bruits        Vascular:                                          Extremities and Back:  no deformities, clubbing, cyanosis, erythema observed              Neurological:  affect appropriate, oriented to time, person and place              CC  Sujit Galloway MD  [facility]  [address] [place] [address]

## 2017-02-10 NOTE — LETTER
"February 10, 2017      Sujit Galloway MD    [facility]    [address] [place]  [address]       RE: Glenny Butts    MRN: [id number]   : 1960      Dear Dr. Galloway:      Thank you for allowing me to participate in the care of your very delightful patient.  As you know, Glenny is a 56-year-old female with a history of premature coronary disease, status post PCI of the mid-LAD when she presented with acute coronary syndrome in 2015.  Most recent coronary angiogram performed 10/2015 demonstrated patent stent and no significant other coronary disease.  She was doing quite well until a few weeks ago when she woke up with a very sharp chest pain unlike her previous chest discomfort.  She went to the bathroom, and before knew, she collapsed onto the floor briefly.  The patient is known to have sinus bradycardia on a low dose of beta blocker.  When she came into the ER, the EKG demonstrated that she was still in sinus bradycardia, rate of about 52 beats per minute.  Telemetry over the next 2 days while she was there was unremarkable.  Her beta blocker was discontinued, however.  The patient underwent a stress test which showed no evidence of perfusion defect.  A 30-day event monitor was subsequently ordered, and she is here to see me for followup.      Since being discharged from the hospital, the patient has not had any near-syncope or syncope.  She states sometimes she has \"small\" fleeting episode that last for a second or less all of her life, and she never thought much of it.  The monitor showed that she continued to have some sinus bradycardia, especially when she sleeps, but she did have an episode of sinus bradycardia at 39 beats per minute at 1:00 in the afternoon.  The patient did not have any symptoms at that time.  She continues to be very active and able to do most of the things she wants to.  The patient stated that she had horrible heartburn when she went on " "Brilinta requiring proton pump inhibitor, but since being off that medication she has not had any more heartburn.  She occasionally does take Tums, however.      Given the sharp nature of her chest pain and the fact that it woke her up at night, it certainly could be esophageal spasm from her acid reflux disease.  Acute pain can induce syncope as well.  The patient appears to have a baseline sinus bradycardia despite being off the medication, and it is likely not to play a role in her recent syncopal episode.  I asked her to continue wearing a monitor for now and then write down the time and date of this \"small episode\" for which the patient has had for \"years now.\"            We will update her regarding the results of the CardioNet monitor, and if unremarkable should will continue to follow with her primary cardiologist, Dr. Camarillo.      Sincerely,            Jarvis Weinstein MD      "

## 2017-02-11 NOTE — PROGRESS NOTES
"February 10, 2017      Sujit Galloway MD    [facility]    [address] [place]  [address]       RE: Glenny Butts    MRN: [id number]   : 1960      Dear Dr. Galloway:      Thank you for allowing me to participate in the care of your very delightful patient.  As you know, Glenny is a 56-year-old female with a history of premature coronary disease, status post PCI of the mid-LAD when she presented with acute coronary syndrome in 2015.  Most recent coronary angiogram performed 10/2015 demonstrated patent stent and no significant other coronary disease.  She was doing quite well until a few weeks ago when she woke up with a very sharp chest pain unlike her previous chest discomfort.  She went to the bathroom, and before knew, she collapsed onto the floor briefly.  The patient is known to have sinus bradycardia on a low dose of beta blocker.  When she came into the ER, the EKG demonstrated that she was still in sinus bradycardia, rate of about 52 beats per minute.  Telemetry over the next 2 days while she was there was unremarkable.  Her beta blocker was discontinued, however.  The patient underwent a stress test which showed no evidence of perfusion defect.  A 30-day event monitor was subsequently ordered, and she is here to see me for followup.      Since being discharged from the hospital, the patient has not had any near-syncope or syncope.  She states sometimes she has \"small\" fleeting episode that last for a second or less all of her life, and she never thought much of it.  The monitor showed that she continued to have some sinus bradycardia, especially when she sleeps, but she did have an episode of sinus bradycardia at 39 beats per minute at 1:00 in the afternoon.  The patient did not have any symptoms at that time.  She continues to be very active and able to do most of the things she wants to.  The patient stated that she had horrible heartburn when she went on " "Brilinta requiring proton pump inhibitor, but since being off that medication she has not had any more heartburn.  She occasionally does take Tums, however.      Given the sharp nature of her chest pain and the fact that it woke her up at night, it certainly could be esophageal spasm from her acid reflux disease.  Acute pain can induce syncope as well.  The patient appears to have a baseline sinus bradycardia despite being off the medication, and it is likely not to play a role in her recent syncopal episode.  I asked her to continue wearing a monitor for now and then write down the time and date of this \"small episode\" for which the patient has had for \"years now.\"  We will update her regarding the results of the CardioNet monitor, and if unremarkable should will continue to follow with her primary cardiologist, Dr. Camarillo.      Sincerely,            MD DAMIAN Paz MD             D: 02/10/2017 15:49   T: 02/10/2017 21:10   MT: JOHN      Name:     SEBASTIEN SAMUEL   MRN:      [id number]        Account:      [id number]   :      1960           Service Date: 02/10/2017      Document: [id number]    "

## 2017-02-13 PROBLEM — E78.00 PURE HYPERCHOLESTEROLEMIA: Status: ACTIVE | Noted: 2017-02-13

## 2017-02-13 NOTE — PROGRESS NOTES
Cardionet strip 2-10-17 noted showing Sinus Bradycardia with junctional rhythm onset rate 3846,   Strip 2-11-17 noted Sinus Bradycardia rate 38-55.   Strip done 2-12-17 noted sinus bradycardia rate 36-to 55.   Strips in folder.

## 2017-02-14 NOTE — PROGRESS NOTES
Cardionet Strip done 2-13-17 noted Sinus BRadycardia rate 38 to 40 from Midnight to 12:25 AM.   Strips in folder.

## 2017-02-17 ENCOUNTER — OFFICE VISIT (OUTPATIENT)
Dept: FAMILY MEDICINE | Facility: CLINIC | Age: 57
End: 2017-02-17

## 2017-02-17 VITALS
BODY MASS INDEX: 19.69 KG/M2 | HEART RATE: 51 BPM | OXYGEN SATURATION: 98 % | SYSTOLIC BLOOD PRESSURE: 110 MMHG | WEIGHT: 122 LBS | DIASTOLIC BLOOD PRESSURE: 60 MMHG

## 2017-02-17 DIAGNOSIS — D69.6 THROMBOCYTOPENIA, UNSPECIFIED (H): Primary | ICD-10-CM

## 2017-02-17 DIAGNOSIS — R55 SYNCOPE, UNSPECIFIED SYNCOPE TYPE: ICD-10-CM

## 2017-02-17 LAB
% GRANULOCYTES: 60.3 % (ref 42.2–75.2)
HCT VFR BLD AUTO: 40 % (ref 35–46)
HEMOGLOBIN: 13.2 G/DL (ref 11.8–15.5)
LYMPHOCYTES NFR BLD AUTO: 31 % (ref 20.5–51.1)
MCH RBC QN AUTO: 30.1 PG (ref 27–31)
MCHC RBC AUTO-ENTMCNC: 33.1 G/DL (ref 33–37)
MCV RBC AUTO: 91.1 FL (ref 80–100)
MONOCYTES NFR BLD AUTO: 8.7 % (ref 1.7–9.3)
PLATELET # BLD AUTO: 168 K/UL (ref 140–450)
RBC # BLD AUTO: 4.39 X10/CMM (ref 3.7–5.2)
WBC # BLD AUTO: 4 X10/CMM (ref 3.8–11)

## 2017-02-17 PROCEDURE — 99214 OFFICE O/P EST MOD 30 MIN: CPT | Performed by: FAMILY MEDICINE

## 2017-02-17 PROCEDURE — 85025 COMPLETE CBC W/AUTO DIFF WBC: CPT | Performed by: FAMILY MEDICINE

## 2017-02-17 PROCEDURE — 36415 COLL VENOUS BLD VENIPUNCTURE: CPT | Performed by: FAMILY MEDICINE

## 2017-02-17 RX ORDER — METOPROLOL SUCCINATE 25 MG/1
TABLET, EXTENDED RELEASE ORAL
Refills: 3 | COMMUNITY
Start: 2016-08-27 | End: 2017-02-17

## 2017-02-17 NOTE — MR AVS SNAPSHOT
After Visit Summary   2/17/2017    Glenny Butts    MRN: [id number]           Patient Information     Date Of Birth          1960        Visit Information        Provider Department      2/17/2017 9:30 AM Sujit Galloway MD [facility]        Today's Diagnoses     Thrombocytopenia, unspecified (H)    -  1    Syncope, unspecified syncope type           Follow-ups after your visit        Your next 10 appointments already scheduled     Feb 22, 2017  9:15 AM CST   Return Visit with Janes Camarillo MD   [facility] ([facility])    [address] [place] [id number] [phone]              Who to contact     If you have questions or need follow up information about today's clinic visit or your schedule please contact [facility] directly at [phone].  Normal or non-critical lab and imaging results will be communicated to you by MyChart, letter or phone within 4 business days after the clinic has received the results. If you do not hear from us within 7 days, please contact the clinic through Mochilahart or phone. If you have a critical or abnormal lab result, we will notify you by phone as soon as possible.  Submit refill requests through GraphOn or call your pharmacy and they will forward the refill request to us. Please allow 3 business days for your refill to be completed.          Additional Information About Your Visit        MyChart Information     GraphOn gives you secure access to your electronic health record. If you see a primary care provider, you can also send messages to your care team and make appointments. If you have questions, please call your primary care clinic.  If you do not have a primary care provider, please call [phone] and they will assist you.        Care EveryWhere ID     This is your Care EveryWhere ID. This could be used by other organizations to access your [facility]  medical records  [id number]        Your Vitals Were     Pulse Last Period Pulse Oximetry BMI (Body Mass Index)          51 (Approximate) 98% 19.69 kg/m2         Blood Pressure from Last 3 Encounters:   02/17/17 110/60   02/10/17 118/62   01/30/17 101/67    Weight from Last 3 Encounters:   02/17/17 55.3 kg (122 lb)   02/10/17 55.3 kg (122 lb)   01/30/17 55 kg (121 lb 4.1 oz)              We Performed the Following     CBC with Diff/Plt ([facility])        Primary Care Provider Office Phone # Fax #    Sujit Galloway [phone]       [facility] [address]  [facility] [place] [address]        Thank you!     Thank you for choosing [facility]  for your care. Our goal is always to provide you with excellent care. Hearing back from our patients is one way we can continue to improve our services. Please take a few minutes to complete the written survey that you may receive in the mail after your visit with us. Thank you!             Your Updated Medication List - Protect others around you: Learn how to safely use, store and throw away your medicines at www.disposemymeds.org.          This list is accurate as of: 2/17/17 10:22 AM.  Always use your most recent med list.                   Brand Name Dispense Instructions for use    ASPIRIN PO      Take 81 mg by mouth daily       atorvastatin 40 MG tablet    LIPITOR    90 tablet    Take 1 tablet (40 mg) by mouth daily       BENADRYL PO      Take 25 mg by mouth as needed Reported on 2/17/2017       doxylamine 25 MG Tabs tablet    UNISOM     Take 25 mg by mouth nightly as needed       lisinopril 2.5 MG tablet    PRINIVIL/Zestril    90 tablet    Take 1 tablet (2.5 mg) by mouth daily       nitroglycerin 0.4 MG sublingual tablet    NITROSTAT    25 tablet    Place 1 tablet (0.4 mg) under the tongue every 5 minutes as needed for chest pain If you are still having symptoms after 3 doses (15 minutes) call 911.

## 2017-02-17 NOTE — PROGRESS NOTES
Dear Glenny,   I am writing to report that your included test results are within expected ranges. I do not suggest that we make any changes at this time.    Sujit Galloway M.D.

## 2017-02-17 NOTE — PROGRESS NOTES
cardionet strip 2/16/17 showing sinus bradycardia @ 39 BPM and junctional rhythm @ 52 BPM noted. Strips placed in event folder

## 2017-02-17 NOTE — PROGRESS NOTES
SUBJECTIVE:                                                      Patient presents for Hospital Followup Visit:    Hospital:  [facility]      Date of Admission: 1/29/17  Date of Discharge: 1/30/17  Reason(s) for Admission: Chest pain, syncope, bradycardia, CAD            Problems taking medications regularly:  None       Medication changes since discharge: None       Problems adhering to non-medication therapy:  None    Summary of hospitalization:  [facility] discharge summary reviewed    Date of Admission: 1/29/2017  Date of Discharge: 1/30/2017  Discharging Provider: Jeyson Kapoor MD  Date of Service (when I saw the patient): 01/30/2017        Discharge Diagnoses      Chest pain, atypical  Syncope  Bradycardia  Coronary artery disease        History of Present Illness     Glenny Butts is a 56 year old female who presents with epigastric discomfort at approximately midnight followed by stabbing chest discomfort and a subsequent episode of syncope when she was getting up to go to the bathroom. Patient endorses similar type of chest pain with prior myocardial infarction January 2015 though syncope was not present.        Hospital Course     Glenny Butts was admitted on 1/29/2017. The following problems were addressed during her hospitalization:     Chest pain, atypical  Mrs. Butts presented after having onset of epigastric and stabbing chest pain at rest. After which she had a syncopal episode after getting up from the toilet. Prior had been exercising without issue on elliptical runner. Troponins were checked and negative. Lexiscan stress test was done and was negative for inducible ischemia. EF during study was noted to be 69%. She will follow up with her primary cardiologist, Dr. Camarillo, within a month.      Syncope  Etiology unclear. Telemetry inpatient with NSR with occ PVC. Had frequent PVC's during stress test but this may have been 2/2 holding of her metoprolol.  Discussed with Dr. Camarillo, will hold metoprolol. Pt will get a 30 day event monitor. She should have follow up with Dr. Camarillo's nurse practitioner in one week and with Dr. Camarillo in one month. Instructed to report any sx to her primary MD or cardiology clinic.      Bradycardia  Noted to have heart rates in the 40's to 50's systolic in the hospital. Per Dr. Camarillo, concern that metoprolol may be worsening bradycardia. Will hold metoprolol and have 30 day event monitor as above. Will have follow up as above.      Coronary artery disease  With known h/o CAD with mid LAD drug-eluting stent January 2015, known jailed diagonal #2 90% stenosis on repeat October 2015 angiogram, diffuse 25-35 percent disease otherwise. As above, no evidence of ACS or inducible ischemia on stress test this admission.      Jeyson Kapoor M.D.  Hospitalist  Pager [phone]  Diagnostic Tests/Treatments reviewed.  Follow up needed: none  Other Healthcare Providers Involved in Patient s Care:         None  Update since discharge: improved.     ROS:  Constitutional, neuro, ENT, endocrine, pulmonary, cardiac, gastrointestinal, genitourinary, musculoskeletal, integument and psychiatric systems are negative, except as otherwise noted.    OBJECTIVE:                                                      APPEARANCE: = Relaxed and in no distress  Conj/Eyelids = noninjected and lids and lashes are without inflammation  PERRLA/Irises = Pupils Round Reactive to Light and Irisis without inflammation  Neck = No anterior or posterior adenopathy appreciated.  Thyroid = Not enlarged and no masses felt  Resp effort = Calm regular breathing  Breath Sounds = Good air movement with no rales or rhonchi in any lung fields  Heart Rate, Rythym, & sounds (no Murm)  = Regular rate and rythym with no S3, S4, or murmer appreciated.  Carotid Art's = Pulses full and equal and no bruits appreciated  Abdomen = Soft, nontender, no masses, & bowel sounds in all  quadrants  Liver/Spleen = Normal span and no splenomegaly noted  Digits and Nails = FROM in all finger joints, no nail dystrophy  Ext (edema) = No pretibial edema noted or elsewhere  Musculsktl =  Strength and ROM of major joints are within normal limits  SKIN = absent significant rashes or lesions   Recent/Remote Memory = Alert and Oriented x 3  Mood/Affect = Cooperative and interested        ASSESSMENT/PLAN:                                                      There are no diagnoses linked to this encounter.     Post Discharge Medication Reconciliation: discharge medications reconciled and changed, per note/orders (see AVS).  Issues to address: No issues identified.  Plan of care communicated with patient  Assessment/Plan:  Glenny was seen today for hospital f/u, other, other and blood draw.    Diagnoses and all orders for this visit:    Thrombocytopenia, unspecified (H)    Syncope, unspecified syncope type    >25 min spent with patient, greater than 50% spent on discussion/education/planning, etc. About The primary encounter diagnosis was Thrombocytopenia, unspecified (H). A diagnosis of Syncope, unspecified syncope type was also pertinent to this visit.      Sujit Galloway MD  [facility]  [phone]

## 2017-02-20 ENCOUNTER — TELEPHONE (OUTPATIENT)
Dept: CARDIOLOGY | Facility: CLINIC | Age: 57
End: 2017-02-20

## 2017-02-20 DIAGNOSIS — I25.10 CORONARY ARTERY DISEASE INVOLVING NATIVE CORONARY ARTERY OF NATIVE HEART WITHOUT ANGINA PECTORIS: ICD-10-CM

## 2017-02-20 RX ORDER — LISINOPRIL 2.5 MG/1
2.5 TABLET ORAL DAILY
Qty: 90 TABLET | Refills: 3 | Status: SHIPPED | OUTPATIENT
Start: 2017-02-20 | End: 2018-02-13

## 2017-02-20 NOTE — PROGRESS NOTES
Cardionet strips from 2-18-17 and 2-19-17 noting Sinus Bradycardia rate 39 to 47 at 00:00 to 01:40.   OV 2-22-17 with Dr. Camarillo.

## 2017-02-20 NOTE — PROGRESS NOTES
Received a call from patient. Cardionet event monitor that patient is currently wearing and that was placed at discharge, is considered out of network by her insurance. Patient is appealing this decision. She needs a letter from a provider stating that this Cardionet event monitor is the only event monitor that is available at [facility]. The event monitor was ordered by a hospitalist. Patient sees Dr Camarillo on 2-22-17 and would appreciate a letter at that time so that she can send to the insurance company for the appeal. Leana Hylton, CV Imaging Manager

## 2017-02-22 ENCOUNTER — MEDICAL CORRESPONDENCE (OUTPATIENT)
Dept: CARDIOLOGY | Facility: CLINIC | Age: 57
End: 2017-02-22

## 2017-02-22 ENCOUNTER — OFFICE VISIT (OUTPATIENT)
Dept: CARDIOLOGY | Facility: CLINIC | Age: 57
End: 2017-02-22
Payer: COMMERCIAL

## 2017-02-22 ENCOUNTER — DOCUMENTATION ONLY (OUTPATIENT)
Dept: CARDIOLOGY | Facility: CLINIC | Age: 57
End: 2017-02-22

## 2017-02-22 VITALS
SYSTOLIC BLOOD PRESSURE: 120 MMHG | WEIGHT: 122 LBS | DIASTOLIC BLOOD PRESSURE: 64 MMHG | HEIGHT: 66 IN | BODY MASS INDEX: 19.61 KG/M2 | HEART RATE: 60 BPM

## 2017-02-22 DIAGNOSIS — I25.10 CORONARY ARTERY DISEASE INVOLVING NATIVE CORONARY ARTERY OF NATIVE HEART WITHOUT ANGINA PECTORIS: Primary | ICD-10-CM

## 2017-02-22 PROCEDURE — 99214 OFFICE O/P EST MOD 30 MIN: CPT | Performed by: INTERNAL MEDICINE

## 2017-02-22 NOTE — PROGRESS NOTES
"Patient is requesting a letter for her insurance to justify the cardionet monitor, the payment was denied as \"out of network\".    Contacted patient to let her know signed letter was ready. She requested that we fax a copy to her desk @ [phone]. Copy sent to scan.  "

## 2017-02-22 NOTE — LETTER
2/22/2017    Sujit Galloway MD  [facility]   [address]  [facility] [address]    RE: Glenny Butts       Dear Colleague,    I had the pleasure of seeing Glenny Butts in the [facility].    Mrs. Butts is a pleasant 56-year-old female with a history of coronary artery disease with a prior history of a small non-ST segment elevation myocardial infarction for which she underwent PCI to the LAD due to thrombotic occlusion.  She was recently in the hospital with atypical chest pain associated with syncope.  She returns in post-hospitalization followup.      The patient continues to wear a 30-day monitor which had detailed some PVCs, one 3 beat run of ventricular tachycardia, and bradycardia.  I had the patient see Dr. Weinstein on the 10th of this month which did not reveal any concerns.  He felt that her bradycardia was likely normal for the patient.  During her hospitalization, I had stopped her beta blocker and he agrees with this.  He did not feel that she required any further EP testing.      The patient has been feeling well since discharge from the hospital.  She has had no recurrent syncopal or presyncopal episodes.  She does, however, continue to wear her event monitor which I have instructed she can turn in.  It appears that her atypical pain was related to underlying reflux and she is working with her gastroenterologist for this.      Please see my separate note with her full physical examination.     Outpatient Encounter Prescriptions as of 2/22/2017   Medication Sig Dispense Refill     lisinopril (PRINIVIL/ZESTRIL) 2.5 MG tablet Take 1 tablet (2.5 mg) by mouth daily 90 tablet 3     atorvastatin (LIPITOR) 40 MG tablet Take 1 tablet (40 mg) by mouth daily 90 tablet 3     doxylamine (UNISOM) 25 MG TABS Take 25 mg by mouth nightly as needed       nitroglycerin (NITROSTAT) 0.4 MG SL tablet Place 1 tablet (0.4 mg) under the tongue every 5 minutes as  needed for chest pain If you are still having symptoms after 3 doses (15 minutes) call 911. 25 tablet 0     ASPIRIN PO Take 81 mg by mouth daily       DiphenhydrAMINE HCl (BENADRYL PO) Take 25 mg by mouth as needed Reported on 2/17/2017       No facility-administered encounter medications on file as of 2/22/2017.       IMPRESSION AND PLAN:   1.  Coronary artery disease - Mrs. Garay is clinically asymptomatic and her recent atypical pain is likely related to reflux.  She is going to remain on aspirin 81 mg a day and atorvastatin 40 mg daily unchanged for secondary prevention.   2.  Status post PCI to the LAD with a drug-eluting stent - the patient should remain on aspirin lifelong indefinitely.    3.  Dyslipidemia - the patient will remain on atorvastatin 40 mg daily unchanged for secondary prevention.   4.  Bradycardia - I stopped the patient's beta blocker during her hospitalization.  In the clinic, her heart rate is 60 beats per minute.  Her monitoring did not show a significant burden of PVCs.  Given her underlying resting bradycardia, likely due to chronic exercise and the fact that she cares for herself quite well, I am going to withhold beta blocker therapy.      Overall, Mrs. Butts is clinically stable and I will plan to see her back in routine clinic followup in 1 year.     Again, thank you for allowing me to participate in the care of your patient.      Sincerely,    Janes Camarillo MD     [facility]

## 2017-02-22 NOTE — LETTER
February 22, 2017    RE:  Glenny Butts 1960  [address] [facility] [place] [facility] [address]          To whom it may concern:    This letter is to certify that Glenny Butts was placed on a 30-day event monitor due to episodes of bradycardia and syncope. The Cardionet monitor is the only 30 day system available for our clinic. Glenny Butts was monitored for possible arrhythmias and was seen in follow up February 20, 2017.      Sincerely,      Janes Esquivel M.D.  [facility]

## 2017-02-22 NOTE — PROGRESS NOTES
HISTORY OF PRESENT ILLNESS:  Mrs. Butts is a pleasant 56-year-old female with a history of coronary artery disease with a prior history of a small non-ST segment elevation myocardial infarction for which she underwent PCI to the LAD due to thrombotic occlusion.  She was recently in the hospital with atypical chest pain associated with syncope.  She returns in post-hospitalization followup.      The patient continues to wear a 30-day monitor which had detailed some PVCs, one 3 beat run of ventricular tachycardia, and bradycardia.  I had the patient see Dr. Weinstein on the 10th of this month which did not reveal any concerns.  He felt that her bradycardia was likely normal for the patient.  During her hospitalization, I had stopped her beta blocker and he agrees with this.  He did not feel that she required any further EP testing.      The patient has been feeling well since discharge from the hospital.  She has had no recurrent syncopal or presyncopal episodes.  She does, however, continue to wear her event monitor which I have instructed she can turn in.  It appears that her atypical pain was related to underlying reflux and she is working with her gastroenterologist for this.      Please see my separate note with her full physical examination.      IMPRESSION AND PLAN:   1.  Coronary artery disease - Mrs. Garay is clinically asymptomatic and her recent atypical pain is likely related to reflux.  She is going to remain on aspirin 81 mg a day and atorvastatin 40 mg daily unchanged for secondary prevention.   2.  Status post PCI to the LAD with a drug-eluting stent - the patient should remain on aspirin lifelong indefinitely.    3.  Dyslipidemia - the patient will remain on atorvastatin 40 mg daily unchanged for secondary prevention.   4.  Bradycardia - I stopped the patient's beta blocker during her hospitalization.  In the clinic, her heart rate is 60 beats per minute.  Her monitoring did not show a significant  burden of PVCs.  Given her underlying resting bradycardia, likely due to chronic exercise and the fact that she cares for herself quite well, I am going to withhold beta blocker therapy.      Overall, Mrs. Samuel is clinically stable and I will plan to see her back in routine clinic followup in 1 year.         ISH MARTINS MD             D: 2017 11:15   T: 2017 13:27   MT: [id number]      Name:     SEBASTIEN SAMUEL   MRN:      [id number]        Account:      [id number]   :      1960           Service Date: 2017      Document: [id number]

## 2017-02-22 NOTE — PROGRESS NOTES
HPI and Plan:   See dictation    Orders Placed This Encounter   Procedures     Follow-Up with Cardiologist       No orders of the defined types were placed in this encounter.      There are no discontinued medications.      Encounter Diagnosis   Name Primary?     Coronary artery disease involving native coronary artery of native heart without angina pectoris Yes       CURRENT MEDICATIONS:  Current Outpatient Prescriptions   Medication Sig Dispense Refill     lisinopril (PRINIVIL/ZESTRIL) 2.5 MG tablet Take 1 tablet (2.5 mg) by mouth daily 90 tablet 3     atorvastatin (LIPITOR) 40 MG tablet Take 1 tablet (40 mg) by mouth daily 90 tablet 3     doxylamine (UNISOM) 25 MG TABS Take 25 mg by mouth nightly as needed       nitroglycerin (NITROSTAT) 0.4 MG SL tablet Place 1 tablet (0.4 mg) under the tongue every 5 minutes as needed for chest pain If you are still having symptoms after 3 doses (15 minutes) call 911. 25 tablet 0     ASPIRIN PO Take 81 mg by mouth daily       DiphenhydrAMINE HCl (BENADRYL PO) Take 25 mg by mouth as needed Reported on 2/17/2017         ALLERGIES     Allergies   Allergen Reactions     Dust Mites      Grass      Trees        PAST MEDICAL HISTORY:  Past Medical History   Diagnosis Date     CAD (coronary artery disease) 2/15     Stent to LAD 01/2015     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      Hyperlipidemia      Ischemic cardiomyopathy      NSTEMI (non-ST elevated myocardial infarction) (H) 2/15     S/P coronary angiogram 10-5-2015     patent prev placed stent-aggressive medical management     Stress incontinence      Syncope        PAST SURGICAL HISTORY:  Past Surgical History   Procedure Laterality Date     Colonoscopy       Cholecystectomy       C dexa, bone density, axial skel       [place] teeth       Hysterectomy vaginal N/A 12/19/2014     Procedure: HYSTERECTOMY VAGINAL;  Surgeon: Aarti Ramirez MD;  Location:  OR     Heart cath, angioplasty  1/27/15      mid LAD-2.75 X 28 mm Promus  "premier FAM        FAMILY HISTORY:  Family History   Problem Relation Age of Onset     Dementia Mother      Myocardial Infarction Father 34     MI     Hypertension Brother      Hypertension Brother        SOCIAL HISTORY:  Social History     Social History     Marital status:      Spouse name: N/A     Number of children: N/A     Years of education: N/A     Social History Main Topics     Smoking status: Never Smoker     Smokeless tobacco: Never Used     Alcohol use Yes      Comment: 4 drinks per month     Drug use: No     Sexual activity: Not Asked     Other Topics Concern     Caffeine Concern No     cup of tea, no coffee     Sleep Concern Yes     takes OTC med     Stress Concern No     Weight Concern No     Special Diet Yes     LOW FAT, low sugar     Exercise Yes      4 times week, brisk walking treadmill, elyptical     Seat Belt Yes     Parent/Sibling W/ Cabg, Mi Or Angioplasty Before 65f 55m? Yes     Social History Narrative       Review of Systems:  Skin:  Negative       Eyes:  Positive for contacts    ENT:  Negative      Respiratory:  Positive for cough     Cardiovascular:  Negative;chest pain;exercise intolerance;lightheadedness;dizziness;fatigue Positive for;edema;palpitations palpitation one episode since last OV, edema, occ at the end of the day  Gastroenterology: Negative      Genitourinary:  Negative      Musculoskeletal:  Negative      Neurologic:  Negative      Psychiatric:  Positive for sleep disturbances    Heme/Lymph/Imm:  Negative      Endocrine:  Negative        Physical Exam:  Vitals: /64  Pulse 60  Ht 1.676 m (5' 6\")  Wt 55.3 kg (122 lb)  LMP  (Approximate)  BMI 19.69 kg/m2    Constitutional:           Skin:           Head:           Eyes:           ENT:           Neck:           Chest:             Cardiac:                    Abdomen:           Vascular:                                          Extremities and Back:                 Neurological:                 CC  No referring " provider defined for this encounter.

## 2017-02-22 NOTE — MR AVS SNAPSHOT
After Visit Summary   2/22/2017    Glenny Butts    MRN: [id number]           Patient Information     Date Of Birth          1960        Visit Information        Provider Department      2/22/2017 9:15 AM Janes Camarillo MD [facility]        Today's Diagnoses     Coronary artery disease involving native coronary artery of native heart without angina pectoris    -  1       Follow-ups after your visit        Additional Services     Follow-Up with Cardiologist                 Future tests that were ordered for you today     Open Future Orders        Priority Expected Expires Ordered    Follow-Up with Cardiologist Routine 2/22/2018 7/7/2018 2/22/2017            Who to contact     If you have questions or need follow up information about today's clinic visit or your schedule please contact [facility] directly at [phone].  Normal or non-critical lab and imaging results will be communicated to you by [employer], letter or phone within 4 business days after the clinic has received the results. If you do not hear from us within 7 days, please contact the clinic through [employer] or phone. If you have a critical or abnormal lab result, we will notify you by phone as soon as possible.  Submit refill requests through [employer] or call your pharmacy and they will forward the refill request to us. Please allow 3 business days for your refill to be completed.          Additional Information About Your Visit        MyChart Information     [employer] gives you secure access to your electronic health record. If you see a primary care provider, you can also send messages to your care team and make appointments. If you have questions, please call your primary care clinic.  If you do not have a primary care provider, please call [phone] and they will assist you.        Care EveryWhere ID     This is your Care EveryWhere ID. This  "could be used by other organizations to access your [place] medical records  [id number]        Your Vitals Were     Pulse Height Last Period BMI (Body Mass Index)          60 1.676 m (5' 6\") (Approximate) 19.69 kg/m2         Blood Pressure from Last 3 Encounters:   02/22/17 120/64   02/17/17 110/60   02/10/17 118/62    Weight from Last 3 Encounters:   02/22/17 55.3 kg (122 lb)   02/17/17 55.3 kg (122 lb)   02/10/17 55.3 kg (122 lb)               Primary Care Provider Office Phone # Fax #    Sujit Galloway [phone]       [facility] [address]  [facility] [address]        Thank you!     Thank you for choosing [facility] [place]  for your care. Our goal is always to provide you with excellent care. Hearing back from our patients is one way we can continue to improve our services. Please take a few minutes to complete the written survey that you may receive in the mail after your visit with us. Thank you!             Your Updated Medication List - Protect others around you: Learn how to safely use, store and throw away your medicines at www.disposemymeds.org.          This list is accurate as of: 2/22/17 10:12 AM.  Always use your most recent med list.                   Brand Name Dispense Instructions for use    ASPIRIN PO      Take 81 mg by mouth daily       atorvastatin 40 MG tablet    LIPITOR    90 tablet    Take 1 tablet (40 mg) by mouth daily       BENADRYL PO      Take 25 mg by mouth as needed Reported on 2/17/2017       doxylamine 25 MG Tabs tablet    UNISOM     Take 25 mg by mouth nightly as needed       lisinopril 2.5 MG tablet    PRINIVIL/Zestril    90 tablet    Take 1 tablet (2.5 mg) by mouth daily       nitroglycerin 0.4 MG sublingual tablet    NITROSTAT    25 tablet    Place 1 tablet (0.4 mg) under the tongue every 5 minutes as needed for chest pain If you are still having symptoms after 3 doses (15 minutes) call 911.    "

## 2017-02-22 NOTE — PROGRESS NOTES
cardionet strip 2/20/17 showing junctional rhythm @ 55 BPM. Patient had OV today with Dr. Camarillo. Strip placed in event folder

## 2017-08-16 ENCOUNTER — TELEPHONE (OUTPATIENT)
Dept: CARDIOLOGY | Facility: CLINIC | Age: 57
End: 2017-08-16

## 2017-08-16 DIAGNOSIS — I25.10 CORONARY ARTERY DISEASE INVOLVING NATIVE CORONARY ARTERY OF NATIVE HEART WITHOUT ANGINA PECTORIS: ICD-10-CM

## 2017-08-16 LAB
ALT SERPL W P-5'-P-CCNC: 20 U/L (ref 5–30)
CHOLEST SERPL-MCNC: 140 MG/DL
HDLC SERPL-MCNC: 72 MG/DL
LDLC SERPL CALC-MCNC: 59 MG/DL
NONHDLC SERPL-MCNC: 68 MG/DL
TRIGL SERPL-MCNC: 46 MG/DL

## 2017-08-16 PROCEDURE — 36415 COLL VENOUS BLD VENIPUNCTURE: CPT | Performed by: INTERNAL MEDICINE

## 2017-08-16 PROCEDURE — 80061 LIPID PANEL: CPT | Performed by: INTERNAL MEDICINE

## 2017-08-16 PROCEDURE — 84460 ALANINE AMINO (ALT) (SGPT): CPT | Performed by: INTERNAL MEDICINE

## 2017-08-16 NOTE — LETTER
August 16, 2017       TO: Glenny Butts   [address] [facility] [place] [facility] [address]       Dear Ms. Butts,    The results of your recent labs were reviewed by Dr. Camarillo. Labs look stable. Please continue with medications, diet and exercise.     Results for orders placed or performed in visit on 08/16/17   Lipid Profile   Result Value Ref Range    Cholesterol 140 <200 mg/dL    Triglycerides 46 <150 mg/dL    HDL Cholesterol 72 >49 mg/dL    LDL Cholesterol Calculated 59 <100 mg/dL    Non HDL Cholesterol 68 <130 mg/dL   ALT   Result Value Ref Range    ALT 20 5 - 30 U/L               Sincerely,    [facility] [place]

## 2017-08-16 NOTE — TELEPHONE ENCOUNTER
FLP's done 8-16-17 - currently on atorvastatin 40 mg. Ordered from OV  Aug  2016.   Chol HDL LDL osbaldo Chol/HDL TG    08/16/17 0801 140 72 59 -- 46   10/23/15 0754 121 69 43 1.8 46

## 2017-08-17 ENCOUNTER — TRANSFERRED RECORDS (OUTPATIENT)
Dept: FAMILY MEDICINE | Facility: CLINIC | Age: 57
End: 2017-08-17

## 2017-08-27 ENCOUNTER — HEALTH MAINTENANCE LETTER (OUTPATIENT)
Age: 57
End: 2017-08-27

## 2017-09-06 ENCOUNTER — TELEPHONE (OUTPATIENT)
Dept: CARDIOLOGY | Facility: CLINIC | Age: 57
End: 2017-09-06

## 2017-11-10 DIAGNOSIS — I21.4 NSTEMI (NON-ST ELEVATED MYOCARDIAL INFARCTION) (H): ICD-10-CM

## 2017-11-10 RX ORDER — ATORVASTATIN CALCIUM 40 MG/1
40 TABLET, FILM COATED ORAL DAILY
Qty: 90 TABLET | Refills: 1 | Status: SHIPPED | OUTPATIENT
Start: 2017-11-10 | End: 2018-02-13

## 2017-11-17 DIAGNOSIS — Z23 NEED FOR PROPHYLACTIC VACCINATION AND INOCULATION AGAINST INFLUENZA: Primary | ICD-10-CM

## 2017-11-17 PROCEDURE — 90471 IMMUNIZATION ADMIN: CPT | Performed by: FAMILY MEDICINE

## 2017-11-17 PROCEDURE — 90686 IIV4 VACC NO PRSV 0.5 ML IM: CPT | Performed by: FAMILY MEDICINE

## 2017-11-17 NOTE — PROGRESS NOTES

## 2018-01-12 ENCOUNTER — TELEPHONE (OUTPATIENT)
Dept: CARDIOLOGY | Facility: CLINIC | Age: 58
End: 2018-01-12

## 2018-01-12 NOTE — TELEPHONE ENCOUNTER
Patient called and wanted to discuss her lipid results from August. She had an LDL of 59, this was higher than her 2015 LDL result of 43 and wondered if this was a bad sign. Reviewed with patient that her LDL goal is to be <70 and that 59 was a good result. Patient sees Dr. Camarillo on 3/13/18.

## 2018-02-13 DIAGNOSIS — I21.4 NSTEMI (NON-ST ELEVATED MYOCARDIAL INFARCTION) (H): ICD-10-CM

## 2018-02-13 DIAGNOSIS — I25.10 CORONARY ARTERY DISEASE INVOLVING NATIVE CORONARY ARTERY OF NATIVE HEART WITHOUT ANGINA PECTORIS: ICD-10-CM

## 2018-02-13 RX ORDER — LISINOPRIL 2.5 MG/1
2.5 TABLET ORAL DAILY
Qty: 30 TABLET | Refills: 0 | Status: SHIPPED | OUTPATIENT
Start: 2018-02-13 | End: 2018-03-13

## 2018-02-13 RX ORDER — ATORVASTATIN CALCIUM 40 MG/1
40 TABLET, FILM COATED ORAL DAILY
Qty: 30 TABLET | Refills: 0 | Status: SHIPPED | OUTPATIENT
Start: 2018-02-13 | End: 2018-03-13

## 2018-03-13 ENCOUNTER — OFFICE VISIT (OUTPATIENT)
Dept: CARDIOLOGY | Facility: CLINIC | Age: 58
End: 2018-03-13
Attending: INTERNAL MEDICINE
Payer: COMMERCIAL

## 2018-03-13 VITALS
SYSTOLIC BLOOD PRESSURE: 126 MMHG | WEIGHT: 124 LBS | HEIGHT: 66 IN | DIASTOLIC BLOOD PRESSURE: 66 MMHG | BODY MASS INDEX: 19.93 KG/M2 | HEART RATE: 56 BPM

## 2018-03-13 DIAGNOSIS — I25.10 CORONARY ARTERY DISEASE INVOLVING NATIVE CORONARY ARTERY OF NATIVE HEART WITHOUT ANGINA PECTORIS: Primary | ICD-10-CM

## 2018-03-13 DIAGNOSIS — I21.4 NSTEMI (NON-ST ELEVATED MYOCARDIAL INFARCTION) (H): ICD-10-CM

## 2018-03-13 PROCEDURE — 99213 OFFICE O/P EST LOW 20 MIN: CPT | Performed by: INTERNAL MEDICINE

## 2018-03-13 RX ORDER — LISINOPRIL 2.5 MG/1
2.5 TABLET ORAL DAILY
Qty: 90 TABLET | Refills: 3 | Status: SHIPPED | OUTPATIENT
Start: 2018-03-13 | End: 2019-02-19

## 2018-03-13 RX ORDER — NICOTINE POLACRILEX 4 MG/1
20 GUM, CHEWING ORAL PRN
COMMUNITY
End: 2018-11-23

## 2018-03-13 RX ORDER — ATORVASTATIN CALCIUM 40 MG/1
40 TABLET, FILM COATED ORAL DAILY
Qty: 90 TABLET | Refills: 3 | Status: SHIPPED | OUTPATIENT
Start: 2018-03-13 | End: 2019-05-20

## 2018-03-13 NOTE — LETTER
3/13/2018    Sujit Galloway MD  [address]  [facility] [place] [address]    RE: Glenny Butts       Dear Colleague,    I had the pleasure of seeing Glenny Butts in the [facility].    HPI and Plan:   See dictation    Orders Placed This Encounter   Procedures     Follow-Up with Cardiologist       Orders Placed This Encounter   Medications     omeprazole 20 MG tablet     Sig: Take 20 mg by mouth as needed     atorvastatin (LIPITOR) 40 MG tablet     Sig: Take 1 tablet (40 mg) by mouth daily     Dispense:  90 tablet     Refill:  3     lisinopril (PRINIVIL/ZESTRIL) 2.5 MG tablet     Sig: Take 1 tablet (2.5 mg) by mouth daily     Dispense:  90 tablet     Refill:  3       Medications Discontinued During This Encounter   Medication Reason     atorvastatin (LIPITOR) 40 MG tablet Reorder     lisinopril (PRINIVIL/ZESTRIL) 2.5 MG tablet Reorder         Encounter Diagnoses   Name Primary?     Coronary artery disease involving native coronary artery of native heart without angina pectoris Yes     NSTEMI (non-ST elevated myocardial infarction) (H)        CURRENT MEDICATIONS:  Current Outpatient Prescriptions   Medication Sig Dispense Refill     omeprazole 20 MG tablet Take 20 mg by mouth as needed       atorvastatin (LIPITOR) 40 MG tablet Take 1 tablet (40 mg) by mouth daily 90 tablet 3     lisinopril (PRINIVIL/ZESTRIL) 2.5 MG tablet Take 1 tablet (2.5 mg) by mouth daily 90 tablet 3     doxylamine (UNISOM) 25 MG TABS Take 12.5 mg by mouth nightly as needed        nitroglycerin (NITROSTAT) 0.4 MG SL tablet Place 1 tablet (0.4 mg) under the tongue every 5 minutes as needed for chest pain If you are still having symptoms after 3 doses (15 minutes) call 911. 25 tablet 0     ASPIRIN PO Take 81 mg by mouth daily       DiphenhydrAMINE HCl (BENADRYL PO) Take 25 mg by mouth as needed Reported on 2/17/2017       [DISCONTINUED] atorvastatin (LIPITOR) 40 MG tablet Take 1 tablet (40 mg) by mouth  daily 30 tablet 0     [DISCONTINUED] lisinopril (PRINIVIL/ZESTRIL) 2.5 MG tablet Take 1 tablet (2.5 mg) by mouth daily 30 tablet 0       ALLERGIES     Allergies   Allergen Reactions     Dust Mites      Grass      Trees        PAST MEDICAL HISTORY:  Past Medical History:   Diagnosis Date     CAD (coronary artery disease) 2/15    Stent to LAD 01/2015     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      Hyperlipidemia      Ischemic cardiomyopathy      NSTEMI (non-ST elevated myocardial infarction) (H) 2/15     S/P coronary angiogram 10-5-2015    patent prev placed stent-aggressive medical management     Stress incontinence      Syncope        PAST SURGICAL HISTORY:  Past Surgical History:   Procedure Laterality Date     C DEXA, BONE DENSITY, AXIAL SKEL       CHOLECYSTECTOMY       COLONOSCOPY       HEART CATH, ANGIOPLASTY  1/27/15     mid LAD-2.75 X 28 mm Promus premier FAM      HYSTERECTOMY VAGINAL N/A 12/19/2014    Procedure: HYSTERECTOMY VAGINAL;  Surgeon: Aarti Ramirez MD;  Location: [facility] OR     wisdom teeth         FAMILY HISTORY:  Family History   Problem Relation Age of Onset     Dementia Mother      Myocardial Infarction Father 34     MI     Hypertension Brother      Hypertension Brother        SOCIAL HISTORY:  Social History     Social History     Marital status:      Spouse name: N/A     Number of children: N/A     Years of education: N/A     Social History Main Topics     Smoking status: Never Smoker     Smokeless tobacco: Never Used     Alcohol use Yes      Comment: 4 drinks per month     Drug use: No     Sexual activity: Not Asked     Other Topics Concern     Caffeine Concern No     cup of tea, no coffee     Sleep Concern Yes     takes OTC med     Stress Concern No     Weight Concern No     Special Diet Yes     LOW FAT, low sugar     Exercise Yes      4 times week, brisk walking treadmill, elyptical     Seat Belt Yes     Parent/Sibling W/ Cabg, Mi Or Angioplasty Before 65f 55m? Yes     Social History  "Narrative       Review of Systems:  Skin:  Negative       Eyes:  Positive for contacts    ENT:  Positive for sinus trouble constantly dripping nose , lower pitch humming in ears at pm's   Respiratory:  Negative       Cardiovascular:    Positive for;edema sock marks at the end of the day   Gastroenterology: Positive for heartburn;reflux takes Omeprazole prn   Genitourinary:  Negative      Musculoskeletal:  Negative      Neurologic:  Positive for headaches occas headaches   Psychiatric:  Positive for sleep disturbances;excessive stress troubles staying asleep   Heme/Lymph/Imm:  Positive for allergies;hay fever    Endocrine:  Negative        Physical Exam:  Vitals: /66 (BP Location: Right arm, Patient Position: Sitting, Cuff Size: Adult Large)  Pulse 56  Ht 1.676 m (5' 6\")  Wt 56.2 kg (124 lb)  LMP 08/20/2014 (Exact Date)  BMI 20.01 kg/m2    Constitutional:  cooperative;in no acute distress        Skin:  warm and dry to the touch          Head:  normocephalic        Eyes:  sclera white        Lymph:No Cervical lymphadenopathy present     ENT:  no pallor or cyanosis        Neck:  JVP normal        Respiratory:  clear to auscultation         Cardiac: regular rhythm;normal S1 and S2                pulses full and equal                                   Right groin site has dime size bump and diffuse ecchymosis, no bruit    GI:  abdomen soft        Extremities and Muscular Skeletal:  no edema              Neurological:  no gross motor deficits;affect appropriate        Psych:  affect appropriate, oriented to time, person and place        CC  Janes Camarillo MD  [address]  [place] [id number]                Thank you for allowing me to participate in the care of your patient.      Sincerely,     Janes Camarillo MD     [facility]    cc:   Janes Camarillo MD  [address]  [place] [id number]        "

## 2018-03-13 NOTE — PROGRESS NOTES
HPI and Plan:   See dictation    Orders Placed This Encounter   Procedures     Follow-Up with Cardiologist       Orders Placed This Encounter   Medications     omeprazole 20 MG tablet     Sig: Take 20 mg by mouth as needed     atorvastatin (LIPITOR) 40 MG tablet     Sig: Take 1 tablet (40 mg) by mouth daily     Dispense:  90 tablet     Refill:  3     lisinopril (PRINIVIL/ZESTRIL) 2.5 MG tablet     Sig: Take 1 tablet (2.5 mg) by mouth daily     Dispense:  90 tablet     Refill:  3       Medications Discontinued During This Encounter   Medication Reason     atorvastatin (LIPITOR) 40 MG tablet Reorder     lisinopril (PRINIVIL/ZESTRIL) 2.5 MG tablet Reorder         Encounter Diagnoses   Name Primary?     Coronary artery disease involving native coronary artery of native heart without angina pectoris Yes     NSTEMI (non-ST elevated myocardial infarction) (H)        CURRENT MEDICATIONS:  Current Outpatient Prescriptions   Medication Sig Dispense Refill     omeprazole 20 MG tablet Take 20 mg by mouth as needed       atorvastatin (LIPITOR) 40 MG tablet Take 1 tablet (40 mg) by mouth daily 90 tablet 3     lisinopril (PRINIVIL/ZESTRIL) 2.5 MG tablet Take 1 tablet (2.5 mg) by mouth daily 90 tablet 3     doxylamine (UNISOM) 25 MG TABS Take 12.5 mg by mouth nightly as needed        nitroglycerin (NITROSTAT) 0.4 MG SL tablet Place 1 tablet (0.4 mg) under the tongue every 5 minutes as needed for chest pain If you are still having symptoms after 3 doses (15 minutes) call 911. 25 tablet 0     ASPIRIN PO Take 81 mg by mouth daily       DiphenhydrAMINE HCl (BENADRYL PO) Take 25 mg by mouth as needed Reported on 2/17/2017       [DISCONTINUED] atorvastatin (LIPITOR) 40 MG tablet Take 1 tablet (40 mg) by mouth daily 30 tablet 0     [DISCONTINUED] lisinopril (PRINIVIL/ZESTRIL) 2.5 MG tablet Take 1 tablet (2.5 mg) by mouth daily 30 tablet 0       ALLERGIES     Allergies   Allergen Reactions     Dust Mites      Grass      Trees        PAST  MEDICAL HISTORY:  Past Medical History:   Diagnosis Date     CAD (coronary artery disease) 2/15    Stent to LAD 01/2015     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      Hyperlipidemia      Ischemic cardiomyopathy      NSTEMI (non-ST elevated myocardial infarction) (H) 2/15     S/P coronary angiogram 10-5-2015    patent prev placed stent-aggressive medical management     Stress incontinence      Syncope        PAST SURGICAL HISTORY:  Past Surgical History:   Procedure Laterality Date     C DEXA, BONE DENSITY, AXIAL SKEL       CHOLECYSTECTOMY       COLONOSCOPY       HEART CATH, ANGIOPLASTY  1/27/15     mid LAD-2.75 X 28 mm Promus premier FAM      HYSTERECTOMY VAGINAL N/A 12/19/2014    Procedure: HYSTERECTOMY VAGINAL;  Surgeon: Aarti Ramirez MD;  Location: [facility] OR     wisdom teeth         FAMILY HISTORY:  Family History   Problem Relation Age of Onset     Dementia Mother      Myocardial Infarction Father 34     MI     Hypertension Brother      Hypertension Brother        SOCIAL HISTORY:  Social History     Social History     Marital status:      Spouse name: N/A     Number of children: N/A     Years of education: N/A     Social History Main Topics     Smoking status: Never Smoker     Smokeless tobacco: Never Used     Alcohol use Yes      Comment: 4 drinks per month     Drug use: No     Sexual activity: Not Asked     Other Topics Concern     Caffeine Concern No     cup of tea, no coffee     Sleep Concern Yes     takes OTC med     Stress Concern No     Weight Concern No     Special Diet Yes     LOW FAT, low sugar     Exercise Yes      4 times week, brisk walking treadmill, elyptical     Seat Belt Yes     Parent/Sibling W/ Cabg, Mi Or Angioplasty Before 65f 55m? Yes     Social History Narrative       Review of Systems:  Skin:  Negative       Eyes:  Positive for contacts    ENT:  Positive for sinus trouble constantly dripping nose , lower pitch humming in ears at pm's   Respiratory:  Negative      "  Cardiovascular:    Positive for;edema sock marks at the end of the day   Gastroenterology: Positive for heartburn;reflux takes Omeprazole prn   Genitourinary:  Negative      Musculoskeletal:  Negative      Neurologic:  Positive for headaches occas headaches   Psychiatric:  Positive for sleep disturbances;excessive stress troubles staying asleep   Heme/Lymph/Imm:  Positive for allergies;hay fever    Endocrine:  Negative        Physical Exam:  Vitals: /66 (BP Location: Right arm, Patient Position: Sitting, Cuff Size: Adult Large)  Pulse 56  Ht 1.676 m (5' 6\")  Wt 56.2 kg (124 lb)  LMP 08/20/2014 (Exact Date)  BMI 20.01 kg/m2    Constitutional:  cooperative;in no acute distress        Skin:  warm and dry to the touch          Head:  normocephalic        Eyes:  sclera white        Lymph:No Cervical lymphadenopathy present     ENT:  no pallor or cyanosis        Neck:  JVP normal        Respiratory:  clear to auscultation         Cardiac: regular rhythm;normal S1 and S2                pulses full and equal                                   Right groin site has dime size bump and diffuse ecchymosis, no bruit    GI:  abdomen soft        Extremities and Muscular Skeletal:  no edema              Neurological:  no gross motor deficits;affect appropriate        Psych:  affect appropriate, oriented to time, person and place        CC  Janes Camarillo MD  [address]  [place] [id number]              "

## 2018-03-13 NOTE — MR AVS SNAPSHOT
After Visit Summary   3/13/2018    Glenny Butts    MRN: [id number]           Patient Information     Date Of Birth          1960        Visit Information        Provider Department      3/13/2018 3:45 PM Janes Camarillo MD [facility]        Today's Diagnoses     Coronary artery disease involving native coronary artery of native heart without angina pectoris        NSTEMI (non-ST elevated myocardial infarction) (H)           Follow-ups after your visit        Additional Services     Follow-Up with Cardiologist                 Future tests that were ordered for you today     Open Future Orders        Priority Expected Expires Ordered    Follow-Up with Cardiologist Routine 3/13/2019 3/14/2019 3/13/2018            Who to contact     If you have questions or need follow up information about today's clinic visit or your schedule please contact [facility] directly at [phone].  Normal or non-critical lab and imaging results will be communicated to you by MyChart, letter or phone within 4 business days after the clinic has received the results. If you do not hear from us within 7 days, please contact the clinic through [employer] or phone. If you have a critical or abnormal lab result, we will notify you by phone as soon as possible.  Submit refill requests through [employer] or call your pharmacy and they will forward the refill request to us. Please allow 3 business days for your refill to be completed.          Additional Information About Your Visit        MyChart Information     [employer] gives you secure access to your electronic health record. If you see a primary care provider, you can also send messages to your care team and make appointments. If you have questions, please call your primary care clinic.  If you do not have a primary care provider, please call [phone] and they will assist you.        Care  "EveryWhere ID     This is your Care EveryWhere ID. This could be used by other organizations to access your [facility] medical records  [id number]        Your Vitals Were     Pulse Height Last Period BMI (Body Mass Index)          56 1.676 m (5' 6\") 08/20/2014 (Exact Date) 20.01 kg/m2         Blood Pressure from Last 3 Encounters:   03/13/18 126/66   02/22/17 120/64   02/17/17 110/60    Weight from Last 3 Encounters:   03/13/18 56.2 kg (124 lb)   02/22/17 55.3 kg (122 lb)   02/17/17 55.3 kg (122 lb)              We Performed the Following     Follow-Up with Cardiologist          Where to get your medicines      These medications were sent to [id number] - [place], [place] - [address] AT [facility] [address] [facility] [address]  [address] [facility], [place] [address]     Phone:  [phone]     atorvastatin 40 MG tablet    lisinopril 2.5 MG tablet          Primary Care Provider Office Phone # Fax #    Sujit Galloway [phone] [address]  [facility] [address]        Equal Access to Services     AMY NI AH: Hadii aad ku hadasho Soomaali, waaxda luqadaha, qaybta kaalmada adeegyada, waxay idiin haychin davi reza. So [facility] [place] [phone].    ATENCIÓN: Si habla español, tiene a vicente disposición servicios gratuitos de asistencia lingüística. ge al [phone].    We comply with applicable federal civil rights laws and [place] laws. We do not discriminate on the basis of race, color, national origin, age, disability, sex, sexual orientation, or gender identity.            Thank you!     Thank you for choosing [facility]  for your care. Our goal is always to provide you with excellent care. Hearing back from our patients is one way we can continue to improve our services. Please take a few minutes to complete the written survey that you may receive in the mail after your visit with us. Thank " you!             Your Updated Medication List - Protect others around you: Learn how to safely use, store and throw away your medicines at www.disposemymeds.org.          This list is accurate as of 3/13/18  4:26 PM.  Always use your most recent med list.                   Brand Name Dispense Instructions for use Diagnosis    ASPIRIN PO      Take 81 mg by mouth daily        atorvastatin 40 MG tablet    LIPITOR    90 tablet    Take 1 tablet (40 mg) by mouth daily    NSTEMI (non-ST elevated myocardial infarction) (H)       BENADRYL PO      Take 25 mg by mouth as needed Reported on 2/17/2017        doxylamine 25 MG Tabs tablet    UNISOM     Take 12.5 mg by mouth nightly as needed        lisinopril 2.5 MG tablet    PRINIVIL/Zestril    90 tablet    Take 1 tablet (2.5 mg) by mouth daily    Coronary artery disease involving native coronary artery of native heart without angina pectoris       nitroGLYcerin 0.4 MG sublingual tablet    NITROSTAT    25 tablet    Place 1 tablet (0.4 mg) under the tongue every 5 minutes as needed for chest pain If you are still having symptoms after 3 doses (15 minutes) call 911.    Chest pain       omeprazole 20 MG tablet      Take 20 mg by mouth as needed

## 2018-03-13 NOTE — LETTER
3/13/2018      Sujit Galloway MD  [address]  [facility] [address]      RE: Sebastien Samuel       Dear Colleague,    I had the pleasure of seeing Sebastien Samuel in the [facility].    Service Date: 2018      HISTORY OF PRESENT ILLNESS:  Ms. Samuel is a pleasant 57-year-old female with a history of coronary artery disease with a prior small non-ST segment elevation MI for which she underwent PCI to the LAD due to a thrombotic occlusion.  She returns in routine 1-year followup.      Ms. Samuel has been feeling well from a cardiovascular standpoint, denies any chest pain, pressure, shortness of breath, orthopnea or paroxysmal nocturnal dyspnea.      Her most recent lipids show an LDL of 59.  Blood pressure is 126/66 in the office.      She walks 30 minutes 5 days per week without difficulty.  She has been eating mostly a plant-based diet without difficulties.      Please see my separate note with her full physical examination.      IMPRESSION AND PLAN:   1.  Coronary artery disease -- Ms. Samuel is clinically asymptomatic with no chest discomfort or angina since I have seen her last.  She will remain on aspirin 81 mg a day and atorvastatin 40 mg daily unchanged for secondary prevention.   2.  Status post PCI to the LAD with a drug-eluting stent -- the patient remains on lifelong aspirin indefinitely.   3.  Dyslipidemia -- the patient's most recent LDL was 59.  I will continue her atorvastatin 40 mg daily unchanged for secondary prevention.      PLAN:  Overall, Ms. Samuel is clinically stable.  She would like to follow her back in the [facility], and I have therefore asked her to follow up with my partners, Dr. Meier in 1 year.         ISH MARTINS MD             D: 2018   T: 2018   MT: JOHN      Name:     SEBASTIEN SAMUEL   MRN:      [id number]        Account:      [id number]   :      1960           Service Date: 2018       Document: [id number]         Outpatient Encounter Prescriptions as of 3/13/2018   Medication Sig Dispense Refill     omeprazole 20 MG tablet Take 20 mg by mouth as needed       atorvastatin (LIPITOR) 40 MG tablet Take 1 tablet (40 mg) by mouth daily 90 tablet 3     lisinopril (PRINIVIL/ZESTRIL) 2.5 MG tablet Take 1 tablet (2.5 mg) by mouth daily 90 tablet 3     doxylamine (UNISOM) 25 MG TABS Take 12.5 mg by mouth nightly as needed        nitroglycerin (NITROSTAT) 0.4 MG SL tablet Place 1 tablet (0.4 mg) under the tongue every 5 minutes as needed for chest pain If you are still having symptoms after 3 doses (15 minutes) call 911. 25 tablet 0     ASPIRIN PO Take 81 mg by mouth daily       DiphenhydrAMINE HCl (BENADRYL PO) Take 25 mg by mouth as needed Reported on 2/17/2017       [DISCONTINUED] atorvastatin (LIPITOR) 40 MG tablet Take 1 tablet (40 mg) by mouth daily 30 tablet 0     [DISCONTINUED] lisinopril (PRINIVIL/ZESTRIL) 2.5 MG tablet Take 1 tablet (2.5 mg) by mouth daily 30 tablet 0     No facility-administered encounter medications on file as of 3/13/2018.      Again, thank you for allowing me to participate in the care of your patient.      Sincerely,    Janes Camarillo MD     [facility]

## 2018-03-14 NOTE — PROGRESS NOTES
Service Date: 2018      HISTORY OF PRESENT ILLNESS:  Ms. Samuel is a pleasant 57-year-old female with a history of coronary artery disease with a prior small non-ST segment elevation MI for which she underwent PCI to the LAD due to a thrombotic occlusion.  She returns in routine 1-year followup.      Ms. Samuel has been feeling well from a cardiovascular standpoint, denies any chest pain, pressure, shortness of breath, orthopnea or paroxysmal nocturnal dyspnea.      Her most recent lipids show an LDL of 59.  Blood pressure is 126/66 in the office.      She walks 30 minutes 5 days per week without difficulty.  She has been eating mostly a plant-based diet without difficulties.      Please see my separate note with her full physical examination.      IMPRESSION AND PLAN:   1.  Coronary artery disease -- Ms. Samuel is clinically asymptomatic with no chest discomfort or angina since I have seen her last.  She will remain on aspirin 81 mg a day and atorvastatin 40 mg daily unchanged for secondary prevention.   2.  Status post PCI to the LAD with a drug-eluting stent -- the patient remains on lifelong aspirin indefinitely.   3.  Dyslipidemia -- the patient's most recent LDL was 59.  I will continue her atorvastatin 40 mg daily unchanged for secondary prevention.      PLAN:  Overall, Ms. Samuel is clinically stable.  She would like to follow her back in the [facility], and I have therefore asked her to follow up with my partners, Dr. Meier in 1 year.         ISH MARTINS MD             D: 2018   T: 2018   MT: JOHN      Name:     SEBASTIEN SAMUEL   MRN:      [id number]        Account:      [id number]   :      1960           Service Date: 2018      Document: [id number]

## 2018-09-16 PROBLEM — M85.80 OSTEOPENIA, UNSPECIFIED LOCATION: Status: ACTIVE | Noted: 2018-09-16

## 2018-09-16 NOTE — PATIENT INSTRUCTIONS
Preventive Health Recommendations  Female Ages 50 - 64    Yearly exam: See your health care provider every year in order to  o Review health changes.   o Discuss preventive care.    o Review your medicines if your doctor has prescribed any.      Get a Pap test every three years (unless you have an abnormal result and your provider advises testing more often).    If you get Pap tests with HPV test, you only need to test every 5 years, unless you have an abnormal result.     You do not need a Pap test if your uterus was removed (hysterectomy) and you have not had cancer.    You should be tested each year for STDs (sexually transmitted diseases) if you're at risk.     Have a mammogram every 1 to 2 years.    Have a colonoscopy at age 50, or have a yearly FIT test (stool test). These exams screen for colon cancer.      Have a cholesterol test every 5 years, or more often if advised.    Have a diabetes test (fasting glucose) every three years. If you are at risk for diabetes, you should have this test more often.     If you are at risk for osteoporosis (brittle bone disease), think about having a bone density scan (DEXA).    Shots: Get a flu shot each year. Get a tetanus shot every 10 years.    Nutrition:     Eat at least 5 servings of fruits and vegetables each day.    Eat whole-grain bread, whole-wheat pasta and brown rice instead of white grains and rice.    Get adequate Calcium and Vitamin D.     Lifestyle    Exercise at least 150 minutes a week (30 minutes a day, 5 days a week). This will help you control your weight and prevent disease.    Limit alcohol to one drink per day.    No smoking.     Wear sunscreen to prevent skin cancer.     See your dentist every six months for an exam and cleaning.    See your eye doctor every 1 to 2 years.    Health Maintenance   Topic Date Due     PHQ-2 Q1 YR  09/14/1970     HIV SCREEN (SYSTEM ASSIGNED)  09/14/1976     HEPATITIS C SCREENING  09/14/1976     ADVANCE DIRECTIVE PLANNING  Q5 YRS  09/14/2013     PAP SCREENING Q3 YR (SYSTEM ASSIGNED)  10/23/2015     CREATININE Q1 YEAR  01/30/2018     INFLUENZA VACCINE (1) 09/01/2018     MAMMO SCREEN Q2 YR (SYSTEM ASSIGNED)  10/11/2018     COLONOSCOPY Q5 YR  05/16/2021     LIPID SCREEN Q5 YR FEMALE (SYSTEM ASSIGNED)  08/16/2022     TETANUS IMMUNIZATION (SYSTEM ASSIGNED)  10/22/2022

## 2018-09-16 NOTE — PROGRESS NOTES
SUBJECTIVE:   CC: Glenny Butts is an 58 year old woman who presents for preventive health visit.     Starts off w/ runny nose - usually takes a decongestant but did take one Sudafed.   Coughs up phlegm. Dry cough. Has had this before. Has had a HA w/ this.   This weekend started generic Delsym - more of suppressant to stop the cough.  Tired. No wheezing. No fevers. Can't take allergy medication because ends up w/ dry mouth.     Started having GERD after heart attack. Was under good control for awhile.   In Jan, was seen in ED for GERD. Offered EGD - declined b/c has so many other tests. Those results are not available to me today.   Had pain 3 weeks ago. Uncomfortable. Pain in epigastrium. Restarted omeprazole.   Pain went away. Intermittent pain now. Rarely has burning in throat. Due for cardiac w/u in a few months for her 3 yr w/u. Wonders is she should wait until then. Mother had a hiatal hernia. She wonders if she could have the same.     [facility]. [facility]i [facility] Medical records through 7/2017 in scanning.   Last DEXA 8/11/2017 - showed new osteopenia. Lowest T-score -1.5.     Healthy Habits:    Do you get at least three servings of calcium containing foods daily (dairy, green leafy vegetables, etc.)? yes and no, taking calcium and/or vitamin D supplement: Vitamin D    Amount of exercise or daily activities, outside of work: 4-7 day(s) per week    Problems taking medications regularly No    Medication side effects: No    Have you had an eye exam in the past two years? yes    Do you see a dentist twice per year? yes    Do you have sleep apnea, excessive snoring or daytime drowsiness? Not great - comes and goes. Unisom works for her. Takes 1/2 of a pill. Has a family hx of dementia.     Works at Ortho office - desk work.     HEARING FREQUENCY:     Right Ear:     500 Hz : Pass   1000 Hz: Pass   2000 Hz: Pass   4000 Hz: Pass  Left Ear:     500 Hz : Pass   1000 Hz: Pass   2000  Hz: Pass   4000 Hz: Pass    Jeanette Olivo CMA    Successfully removed impacted crumen from both ears during office visit today per verbal order from Dr. Bernabe.  Jeanette Olivo CMA    Today's PHQ-2 Score:   PHQ-2 ( 1999 Pfizer) 9/17/2018   Q1: Little interest or pleasure in doing things 0   Q2: Feeling down, depressed or hopeless 0   PHQ-2 Score 0     Abuse: Current or Past(Physical, Sexual or Emotional) - No  Do you feel safe in your environment - Yes    Social History   Substance Use Topics     Smoking status: Never Smoker     Smokeless tobacco: Never Used     Alcohol use Yes      Comment: 4 drinks per month     If you drink alcohol do you typically have >3 drinks per day or >7 drinks per week? No                     Reviewed orders with patient.  Reviewed health maintenance and updated orders accordingly - Yes  BP Readings from Last 3 Encounters:   09/17/18 118/78   03/13/18 126/66   02/22/17 120/64    Wt Readings from Last 3 Encounters:   09/17/18 55.7 kg (122 lb 12.8 oz)   03/13/18 56.2 kg (124 lb)   02/22/17 55.3 kg (122 lb)            Patient Active Problem List   Diagnosis     S/P vaginal hysterectomy     NSTEMI (non-ST elevated myocardial infarction) (H)     Coronary artery disease involving native coronary artery of native heart without angina pectoris     Syncope     Pure hypercholesterolemia     Osteopenia, unspecified location     Past Surgical History:   Procedure Laterality Date     C DEXA, BONE DENSITY, AXIAL SKEL       CHOLECYSTECTOMY       COLONOSCOPY       HEART CATH, ANGIOPLASTY  1/27/15     mid LAD-2.75 X 28 mm Promus premier FAM      HYSTERECTOMY VAGINAL N/A 12/19/2014    Procedure: HYSTERECTOMY VAGINAL;  Surgeon: Aarti Ramirez MD;  Location:  OR     wisdom teeth         Social History   Substance Use Topics     Smoking status: Never Smoker     Smokeless tobacco: Never Used     Alcohol use Yes      Comment: 4 drinks per month     Family History   Problem Relation Age of Onset      Dementia Mother      Myocardial Infarction Father 34     MI     Hypertension Brother      Hypertension Brother          Current Outpatient Prescriptions   Medication Sig Dispense Refill     ASPIRIN PO Take 81 mg by mouth daily       atorvastatin (LIPITOR) 40 MG tablet Take 1 tablet (40 mg) by mouth daily 90 tablet 3     DiphenhydrAMINE HCl (BENADRYL PO) Take 25 mg by mouth as needed Reported on 2/17/2017       doxylamine (UNISOM) 25 MG TABS Take 12.5 mg by mouth nightly as needed        lisinopril (PRINIVIL/ZESTRIL) 2.5 MG tablet Take 1 tablet (2.5 mg) by mouth daily 90 tablet 3     nitroglycerin (NITROSTAT) 0.4 MG SL tablet Place 1 tablet (0.4 mg) under the tongue every 5 minutes as needed for chest pain If you are still having symptoms after 3 doses (15 minutes) call 911. 25 tablet 0     omeprazole 20 MG tablet Take 20 mg by mouth as needed       Vitamin D, Cholecalciferol, 400 units TABS Take 800 Units by mouth daily       Allergies   Allergen Reactions     Dust Mites      Grass      Trees      Recent Labs   Lab Test  09/17/18   0840  08/16/17   0801  01/30/17   0535  01/29/17   0111  10/23/15   0754   LDL  63  59   --    --   43*   HDL  78  72   --    --   69   TRIG  42  46   --    --   46   ALT  28  20   --   54*   --    CR  0.80   --   0.67  0.86   --    GFRESTIMATED   --    --   >90  Non  GFR Calc    68   --    GFRESTBLACK   --    --   >90   GFR Calc    82   --    POTASSIUM  4.3   --   4.2  4.0   --       Patient over age 50, mutual decision to screen reflected in health maintenance.    Pertinent mammograms are reviewed under the imaging tab. 10/11/2016 BIRADS 1. Managed by her Ob-Gyn.   History of abnormal Pap smear: Records not available for review - she sees outside Ob-Gyn.      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      Hyperlipidemia      Ischemic cardiomyopathy      NSTEMI (non-ST elevated myocardial infarction) (H) 2/15     S/P coronary angiogram 10-5-2015    patent prev placed stent-aggressive medical management     Stress incontinence      Syncope       Past Surgical History:   Procedure Laterality Date     C DEXA, BONE DENSITY, AXIAL SKEL       CHOLECYSTECTOMY       COLONOSCOPY       HEART CATH, ANGIOPLASTY  1/27/15     mid LAD-2.75 X 28 mm Promus premier FAM      HYSTERECTOMY VAGINAL N/A 12/19/2014    Procedure: HYSTERECTOMY VAGINAL;  Surgeon: Aarti Ramirez MD;  Location: [facility] OR     wisdom teeth       ROS:  CONSTITUTIONAL: NEGATIVE for fever, chills, change in weight  INTEGUMENTARY/SKIN: NEGATIVE for worrisome rashes, moles or lesions  EYES: NEGATIVE for vision changes or irritation  ENT: NEGATIVE for ear, mouth and throat problems  RESP: POSITIVE for intermittent cough.   BREAST: NEGATIVE for masses, tenderness or discharge  CV: NEGATIVE for chest pain, palpitations or peripheral edema  GI: NEGATIVE for nausea, abdominal pain, heartburn, or change in bowel habits  : NEGATIVE for unusual urinary or vaginal symptoms. No vaginal bleeding.  MUSCULOSKELETAL: NEGATIVE for significant arthralgias or myalgia  NEURO: NEGATIVE for weakness, dizziness or paresthesias  PSYCHIATRIC: NEGATIVE for changes in mood or affect   Epigastric pain.   When had heart attack, had typical male symptoms.     OBJECTIVE:   /78  Pulse 54  Resp 16  Ht 1.676 m (5' 6\")  Wt 55.7 kg (122 lb 12.8 oz)  LMP 08/20/2014 (Exact Date)  SpO2 98%  BMI 19.82 kg/m2  EXAM:  GENERAL APPEARANCE: healthy, alert and no distress  EYES: Eyes grossly normal to inspection, PERRL and conjunctivae and sclerae normal  HENT: ear canals w/ impacted cerumen. TM's occluded. Nose and mouth without ulcers or lesions, oropharynx clear and oral mucous " membranes moist  NECK: no adenopathy, no asymmetry, masses, or scars and thyroid normal to palpation  RESP: lungs clear to auscultation - no rales, rhonchi or wheezes. No wheezing. Dry cough occas present today.   CV: regular rate and rhythm, normal S1 S2, no S3 or S4, no murmur, click or rub, no peripheral edema and peripheral pulses strong  ABDOMEN: soft, nontender, no hepatosplenomegaly, no masses and bowel sounds normal  MS: no musculoskeletal defects are noted and gait is age appropriate without ataxia  SKIN: no suspicious lesions or rashes  NEURO: Normal strength and tone, sensory exam grossly normal, mentation intact and speech normal  PSYCH: mentation appears normal and affect normal/bright    ASSESSMENT/PLAN:   Glenny was seen today for physical, hearing screening, gastrointestinal problem, allergies and flu shot.    Diagnoses and all orders for this visit:    Routine general medical examination at a health care facility   OK to allow cold symptoms to run their course. Supportive cares.     Pure hypercholesterolemia  -     Comp. Metabolic Panel (14) ([facility])  -     Lipid Panel ([facility])    Coronary artery disease involving native coronary artery of native heart without angina pectoris  -     Comp. Metabolic Panel (14) ([facility])  -     CBC with Diff/Plt ([facility])    Epigastric pain  -     EKG 12-lead complete w/read - Clinics - reassuring results by my independent read. Sinus bradycardia.   -     NM Lexiscan stress test; Future  -     CARDIOLOGY EVAL ADULT REFERRAL   Recommended pursuing cardiac f/u now w/ her increasing symptoms.    Explained cardiac eval in 3 yrs was if everything was going well.    Continue taking PPI but use on a daily schedule.     Osteopenia, unspecified location  -     Comp. Metabolic Panel (14) ([facility])  -     CBC with Diff/Plt ([facility])    Bilateral impacted cerumen   Lavaged by nursing.     Screening for diabetes mellitus  -     Comp. Metabolic Panel (14) ([facility])    Encounter for HCV  "screening test for low risk patient  -     HCV Antibody ([facility])    Encounter for screening mammogram for breast cancer   Due now. Managed by her Ob-Gyn.     Screening for cervical cancer   Managed by her Ob-Gyn.     Need for prophylactic vaccination and inoculation against influenza  -     FLU VACCINE, SPLIT VIRUS, IM (QUADRIVALENT) [34358]- >3 YRS  -     Vaccine Administration, Initial [35263]   Counseled pt on influenza vaccination administered today.     Other orders  -     Cancel: Pap IG rfx HPV ASCU ([facility])  -     Cancel: MAMMO -  Screening Digital Bilateral (FUTURE/[facility]); Future    COUNSELING:   Reviewed preventive health counseling, as reflected in patient instructions       Regular exercise       Healthy diet/nutrition       Vision screening       Hearing screening       Immunizations    Tdap current. Recommend shingles vaccination as able.        Osteoporosis Prevention/Bone Health       Colon cancer screening    BP Readings from Last 1 Encounters:   09/17/18 118/78     Estimated body mass index is 19.82 kg/(m^2) as calculated from the following:    Height as of this encounter: 1.676 m (5' 6\").    Weight as of this encounter: 55.7 kg (122 lb 12.8 oz).     reports that she has never smoked. She has never used smokeless tobacco.    Counseling Resources:  ATP IV Guidelines  Pooled Cohorts Equation Calculator  Breast Cancer Risk Calculator  FRAX Risk Assessment  ICSI Preventive Guidelines  Dietary Guidelines for Americans, 2010  USDA's MyPlate  ASA Prophylaxis  Lung CA Screening    Rosalba Liu MD  [facility]    Health Maintenance   Topic Date Due     HIV SCREEN (SYSTEM ASSIGNED)  09/14/1976     ADVANCE DIRECTIVE PLANNING Q5 YRS  09/14/2013     PAP SCREENING Q3 YR (SYSTEM ASSIGNED)  10/23/2015     MAMMO SCREEN Q2 YR (SYSTEM ASSIGNED)  10/11/2018     CREATININE Q1 YEAR  09/17/2019     PHQ-2 Q1 YR  09/17/2019     COLONOSCOPY Q5 YR  05/16/2021     TETANUS IMMUNIZATION (SYSTEM " ASSIGNED)  10/22/2022     LIPID SCREEN Q5 YR FEMALE (SYSTEM ASSIGNED)  09/17/2023     INFLUENZA VACCINE  Completed     HEPATITIS C SCREENING  Completed

## 2018-09-17 ENCOUNTER — OFFICE VISIT (OUTPATIENT)
Dept: FAMILY MEDICINE | Facility: CLINIC | Age: 58
End: 2018-09-17

## 2018-09-17 VITALS
SYSTOLIC BLOOD PRESSURE: 118 MMHG | BODY MASS INDEX: 19.73 KG/M2 | DIASTOLIC BLOOD PRESSURE: 78 MMHG | HEIGHT: 66 IN | RESPIRATION RATE: 16 BRPM | OXYGEN SATURATION: 98 % | HEART RATE: 54 BPM | WEIGHT: 122.8 LBS

## 2018-09-17 DIAGNOSIS — Z12.31 ENCOUNTER FOR SCREENING MAMMOGRAM FOR BREAST CANCER: ICD-10-CM

## 2018-09-17 DIAGNOSIS — Z12.4 SCREENING FOR CERVICAL CANCER: ICD-10-CM

## 2018-09-17 DIAGNOSIS — R10.13 EPIGASTRIC PAIN: ICD-10-CM

## 2018-09-17 DIAGNOSIS — H61.23 BILATERAL IMPACTED CERUMEN: ICD-10-CM

## 2018-09-17 DIAGNOSIS — Z00.00 ROUTINE GENERAL MEDICAL EXAMINATION AT A HEALTH CARE FACILITY: Primary | ICD-10-CM

## 2018-09-17 DIAGNOSIS — Z13.1 SCREENING FOR DIABETES MELLITUS: ICD-10-CM

## 2018-09-17 DIAGNOSIS — I25.10 CORONARY ARTERY DISEASE INVOLVING NATIVE CORONARY ARTERY OF NATIVE HEART WITHOUT ANGINA PECTORIS: ICD-10-CM

## 2018-09-17 DIAGNOSIS — M85.80 OSTEOPENIA, UNSPECIFIED LOCATION: ICD-10-CM

## 2018-09-17 DIAGNOSIS — Z11.59 ENCOUNTER FOR HCV SCREENING TEST FOR LOW RISK PATIENT: ICD-10-CM

## 2018-09-17 DIAGNOSIS — E78.00 PURE HYPERCHOLESTEROLEMIA: ICD-10-CM

## 2018-09-17 DIAGNOSIS — Z23 NEED FOR PROPHYLACTIC VACCINATION AND INOCULATION AGAINST INFLUENZA: ICD-10-CM

## 2018-09-17 LAB
% GRANULOCYTES: 59.4 % (ref 42.2–75.2)
HCT VFR BLD AUTO: 40.2 % (ref 35–46)
HEMOGLOBIN: 13.4 G/DL (ref 11.8–15.5)
LYMPHOCYTES NFR BLD AUTO: 30.4 % (ref 20.5–51.1)
MCH RBC QN AUTO: 30 PG (ref 27–31)
MCHC RBC AUTO-ENTMCNC: 33.5 G/DL (ref 33–37)
MCV RBC AUTO: 89.6 FL (ref 80–100)
MONOCYTES NFR BLD AUTO: 10.2 % (ref 1.7–9.3)
PLATELET # BLD AUTO: 182 K/UL (ref 140–450)
RBC # BLD AUTO: 4.49 X10/CMM (ref 3.7–5.2)
WBC # BLD AUTO: 4.5 X10/CMM (ref 3.8–11)

## 2018-09-17 PROCEDURE — 99214 OFFICE O/P EST MOD 30 MIN: CPT | Mod: 25 | Performed by: FAMILY MEDICINE

## 2018-09-17 PROCEDURE — 36415 COLL VENOUS BLD VENIPUNCTURE: CPT | Performed by: FAMILY MEDICINE

## 2018-09-17 PROCEDURE — 93000 ELECTROCARDIOGRAM COMPLETE: CPT | Performed by: FAMILY MEDICINE

## 2018-09-17 PROCEDURE — 99396 PREV VISIT EST AGE 40-64: CPT | Mod: 25 | Performed by: FAMILY MEDICINE

## 2018-09-17 PROCEDURE — 85025 COMPLETE CBC W/AUTO DIFF WBC: CPT | Performed by: FAMILY MEDICINE

## 2018-09-17 PROCEDURE — 90471 IMMUNIZATION ADMIN: CPT | Performed by: FAMILY MEDICINE

## 2018-09-17 PROCEDURE — 90686 IIV4 VACC NO PRSV 0.5 ML IM: CPT | Performed by: FAMILY MEDICINE

## 2018-09-17 RX ORDER — ERGOCALCIFEROL (VITAMIN D2) 10 MCG
800 TABLET ORAL DAILY
COMMUNITY
End: 2022-05-23

## 2018-09-17 NOTE — PROGRESS NOTES

## 2018-09-17 NOTE — MR AVS SNAPSHOT
After Visit Summary   9/17/2018    Glenny Butts    MRN: [id number]           Patient Information     Date Of Birth          1960        Visit Information        Provider Department      9/17/2018 7:45 AM Rosalba Liu MD [facility]        Today's Diagnoses     Routine general medical examination at a health care facility    -  1    Pure hypercholesterolemia        Coronary artery disease involving native coronary artery of native heart without angina pectoris        Screening for diabetes mellitus        Osteopenia, unspecified location        Encounter for HCV screening test for low risk patient        Encounter for screening mammogram for breast cancer        Screening for cervical cancer        Epigastric pain        Bilateral impacted cerumen          Care Instructions      Preventive Health Recommendations  Female Ages 50 - 64    Yearly exam: See your health care provider every year in order to  o Review health changes.   o Discuss preventive care.    o Review your medicines if your doctor has prescribed any.      Get a Pap test every three years (unless you have an abnormal result and your provider advises testing more often).    If you get Pap tests with HPV test, you only need to test every 5 years, unless you have an abnormal result.     You do not need a Pap test if your uterus was removed (hysterectomy) and you have not had cancer.    You should be tested each year for STDs (sexually transmitted diseases) if you're at risk.     Have a mammogram every 1 to 2 years.    Have a colonoscopy at age 50, or have a yearly FIT test (stool test). These exams screen for colon cancer.      Have a cholesterol test every 5 years, or more often if advised.    Have a diabetes test (fasting glucose) every three years. If you are at risk for diabetes, you should have this test more often.     If you are at risk for osteoporosis (brittle bone disease), think about having a bone  density scan (DEXA).    Shots: Get a flu shot each year. Get a tetanus shot every 10 years.    Nutrition:     Eat at least 5 servings of fruits and vegetables each day.    Eat whole-grain bread, whole-wheat pasta and brown rice instead of white grains and rice.    Get adequate Calcium and Vitamin D.     Lifestyle    Exercise at least 150 minutes a week (30 minutes a day, 5 days a week). This will help you control your weight and prevent disease.    Limit alcohol to one drink per day.    No smoking.     Wear sunscreen to prevent skin cancer.     See your dentist every six months for an exam and cleaning.    See your eye doctor every 1 to 2 years.    Health Maintenance   Topic Date Due     PHQ-2 Q1 YR  09/14/1970     HIV SCREEN (SYSTEM ASSIGNED)  09/14/1976     HEPATITIS C SCREENING  09/14/1976     ADVANCE DIRECTIVE PLANNING Q5 YRS  09/14/2013     PAP SCREENING Q3 YR (SYSTEM ASSIGNED)  10/23/2015     CREATININE Q1 YEAR  01/30/2018     INFLUENZA VACCINE (1) 09/01/2018     MAMMO SCREEN Q2 YR (SYSTEM ASSIGNED)  10/11/2018     COLONOSCOPY Q5 YR  05/16/2021     LIPID SCREEN Q5 YR FEMALE (SYSTEM ASSIGNED)  08/16/2022     TETANUS IMMUNIZATION (SYSTEM ASSIGNED)  10/22/2022               Follow-ups after your visit        Additional Services     CARDIOLOGY EVAL ADULT REFERRAL       Preferred location:  [facility] [phone]   [email or web address]    Please be aware that coverage of these services is subject to the terms and limitations of your health insurance plan.  Call member services at your health plan with any benefit or coverage questions.      Please bring the following to your appointment:  Any x-rays, CTs or MRIs which have been performed. Contact the facility where they were done to arrange for  prior to your scheduled appointment.    List of current medications  This referral request   Any documents/labs given to you for this referral    "               Future tests that were ordered for you today     Open Future Orders        Priority Expected Expires Ordered    NM Lexiscan stress test Routine  9/17/2019 9/17/2018            Who to contact     If you have questions or need follow up information about today's clinic visit or your schedule please contact [facility] directly at [phone].  Normal or non-critical lab and imaging results will be communicated to you by MyChart, letter or phone within 4 business days after the clinic has received the results. If you do not hear from us within 7 days, please contact the clinic through [employer] or phone. If you have a critical or abnormal lab result, we will notify you by phone as soon as possible.  Submit refill requests through [employer] or call your pharmacy and they will forward the refill request to us. Please allow 3 business days for your refill to be completed.          Additional Information About Your Visit        MyChart Information     [employer] gives you secure access to your electronic health record. If you see a primary care provider, you can also send messages to your care team and make appointments. If you have questions, please call your primary care clinic.  If you do not have a primary care provider, please call [phone] and they will assist you.        Care EveryWhere ID     This is your Care EveryWhere ID. This could be used by other organizations to access your [place] medical records  [id number]        Your Vitals Were     Pulse Respirations Height Last Period Pulse Oximetry BMI (Body Mass Index)    54 16 1.676 m (5' 6\") 08/20/2014 (Exact Date) 98% 19.82 kg/m2       Blood Pressure from Last 3 Encounters:   09/17/18 118/78   03/13/18 126/66   02/22/17 120/64    Weight from Last 3 Encounters:   09/17/18 55.7 kg (122 lb 12.8 oz)   03/13/18 56.2 kg (124 lb)   02/22/17 55.3 kg (122 lb)              We Performed the Following     CARDIOLOGY EVAL ADULT REFERRAL     CBC with " Diff/Plt ([facility])     Comp. Metabolic Panel (14) ([facility])     EKG 12-lead complete w/read - Clinics     HCV Antibody ([facility])     Lipid Panel ([facility])        Primary Care Provider Office Phone # Fax #    Rosalba Daisy Liu [phone] [address]  [facility] [place] [address]        Equal Access to Services     [facility] Center[facility]ARGAV : Hadii aad ku hadasho Soomaali, waaxda luqadaha, qaybta kaalmada adeegyada, waxay idiin hayaan adeeg kharash la'aan ah. So [facility] [phone].    ATENCIÓN: Si habla espzoltan, tiene a vicente disposición servicios gratuitos de asistencia lingüística. Llame al [phone].    We comply with applicable federal civil rights laws and [place] laws. We do not discriminate on the basis of race, color, national origin, age, disability, sex, sexual orientation, or gender identity.            Thank you!     Thank you for choosing [facility]  for your care. Our goal is always to provide you with excellent care. Hearing back from our patients is one way we can continue to improve our services. Please take a few minutes to complete the written survey that you may receive in the mail after your visit with us. Thank you!             Your Updated Medication List - Protect others around you: Learn how to safely use, store and throw away your medicines at www.disposemymeds.org.          This list is accurate as of 9/17/18  8:31 AM.  Always use your most recent med list.                   Brand Name Dispense Instructions for use Diagnosis    ASPIRIN PO      Take 81 mg by mouth daily        atorvastatin 40 MG tablet    LIPITOR    90 tablet    Take 1 tablet (40 mg) by mouth daily    NSTEMI (non-ST elevated myocardial infarction) (H)       BENADRYL PO      Take 25 mg by mouth as needed Reported on 2/17/2017        doxylamine 25 MG Tabs tablet    UNISOM     Take 12.5 mg by mouth nightly as needed        lisinopril 2.5 MG tablet    PRINIVIL/Zestril    90 tablet    Take 1 tablet (2.5 mg)  by mouth daily    Coronary artery disease involving native coronary artery of native heart without angina pectoris       nitroGLYcerin 0.4 MG sublingual tablet    NITROSTAT    25 tablet    Place 1 tablet (0.4 mg) under the tongue every 5 minutes as needed for chest pain If you are still having symptoms after 3 doses (15 minutes) call 911.    Chest pain       omeprazole 20 MG tablet      Take 20 mg by mouth as needed        Vitamin D (Cholecalciferol) 400 units Tabs      Take 800 Units by mouth daily

## 2018-09-18 ENCOUNTER — TELEPHONE (OUTPATIENT)
Dept: FAMILY MEDICINE | Facility: CLINIC | Age: 58
End: 2018-09-18

## 2018-09-18 LAB
ALBUMIN SERPL-MCNC: 4.1 G/DL (ref 3.6–4.8)
ALBUMIN/GLOB SERPL: 1.5 {RATIO} (ref 1.2–2.2)
ALP SERPL-CCNC: 77 IU/L (ref 39–117)
ALT SERPL-CCNC: 28 IU/L (ref 0–32)
AST SERPL-CCNC: 33 IU/L (ref 0–40)
BILIRUB SERPL-MCNC: 0.3 MG/DL (ref 0–1.2)
BUN SERPL-MCNC: 16 MG/DL (ref 8–27)
BUN/CREATININE RATIO: 20 (ref 12–28)
CALCIUM SERPL-MCNC: 9.5 MG/DL (ref 8.7–10.3)
CHLORIDE SERPLBLD-SCNC: 100 MMOL/L (ref 96–106)
CHOLEST SERPL-MCNC: 149 MG/DL (ref 100–199)
CREAT SERPL-MCNC: 0.8 MG/DL (ref 0.57–1)
EGFR IF AFRICN AM: 93 ML/MIN/1.73
EGFR IF NONAFRICN AM: 80 ML/MIN/1.73
GLOBULIN, TOTAL: 2.8 G/DL (ref 1.5–4.5)
GLUCOSE SERPL-MCNC: 88 MG/DL (ref 65–99)
HCV AB SERPL QL IA: <0.1 S/CO RATIO (ref 0–0.9)
HDLC SERPL-MCNC: 78 MG/DL
LDL/HDL RATIO: 0.8 RATIO (ref 0–3.2)
LDLC SERPL CALC-MCNC: 63 MG/DL (ref 0–99)
POTASSIUM SERPL-SCNC: 4.3 MMOL/L (ref 3.5–5.2)
PROT SERPL-MCNC: 6.9 G/DL (ref 6–8.5)
SODIUM SERPL-SCNC: 140 MMOL/L (ref 134–144)
TOTAL CO2: 27 MMOL/L (ref 20–29)
TRIGL SERPL-MCNC: 42 MG/DL (ref 0–149)
VLDLC SERPL CALC-MCNC: 8 MG/DL (ref 5–40)

## 2018-09-18 NOTE — TELEPHONE ENCOUNTER
----- Message from Rosalba Liu MD sent at 9/18/2018 11:22 AM CDT -----  Regarding: RE: ear update   Sorry her ear is plugged feeling.   I explained to her yesterday few drops fo 1:1 white vinegar (distilled is OK) and rubbing alcohol. Few drops at body temp (not room temp). The vinegar changes the pH b/c if water left in, may develop simmer's ear and alcohol dries it out - which is why she was supposed to do this yesterday and not wait until today.   Else, have her RTC tomorrow and we'll have Margie give it another go if pt willing. Tnx.   M-  ----- Message -----     From: Erna Smith RN     Sent: 9/18/2018  10:57 AM       To: Rosalba Liu MD  Subject: ear update                                       Patient calls stating at visit yesterday had her ear flushed. Left ear was plugged at end of visit after flushing and was told to call today (tuesday) if ear still plugged. It is still plugged. Says Dr. Bernabe mentioned something about using white vinegar and alcohol. Asks if should do this and if so, is distilled vinegar ok to use? And how much/what ratio would she use? Or do you have other recommendation?  Thanks  Erna

## 2018-09-18 NOTE — TELEPHONE ENCOUNTER
Called patient with Dr. Florecita Liu's recommendations. Patient agrees and will try. She does think plugged sensation is a bit better today.   Erna Smith RN

## 2018-10-05 ENCOUNTER — TELEPHONE (OUTPATIENT)
Dept: FAMILY MEDICINE | Facility: CLINIC | Age: 58
End: 2018-10-05

## 2018-10-05 ENCOUNTER — HOSPITAL ENCOUNTER (OUTPATIENT)
Dept: CARDIOLOGY | Facility: CLINIC | Age: 58
Discharge: HOME | End: 2018-10-05
Attending: FAMILY MEDICINE | Admitting: FAMILY MEDICINE
Payer: COMMERCIAL

## 2018-10-05 VITALS — SYSTOLIC BLOOD PRESSURE: 132 MMHG | HEART RATE: 58 BPM | DIASTOLIC BLOOD PRESSURE: 72 MMHG

## 2018-10-05 DIAGNOSIS — R10.13 EPIGASTRIC PAIN: ICD-10-CM

## 2018-10-05 PROBLEM — R94.39 ABNORMAL NUCLEAR STRESS TEST: Status: ACTIVE | Noted: 2018-10-05

## 2018-10-05 PROCEDURE — 78452 HT MUSCLE IMAGE SPECT MULT: CPT | Mod: 26 | Performed by: INTERNAL MEDICINE

## 2018-10-05 PROCEDURE — A9502 TC99M TETROFOSMIN: HCPCS | Performed by: FAMILY MEDICINE

## 2018-10-05 PROCEDURE — 93018 CV STRESS TEST I&R ONLY: CPT | Performed by: INTERNAL MEDICINE

## 2018-10-05 PROCEDURE — 78452 HT MUSCLE IMAGE SPECT MULT: CPT

## 2018-10-05 PROCEDURE — 93016 CV STRESS TEST SUPVJ ONLY: CPT | Performed by: INTERNAL MEDICINE

## 2018-10-05 PROCEDURE — 34300033 ZZH RX 343: Performed by: FAMILY MEDICINE

## 2018-10-05 PROCEDURE — 25000128 H RX IP 250 OP 636: Performed by: INTERNAL MEDICINE

## 2018-10-05 RX ORDER — ALBUTEROL SULFATE 90 UG/1
2 AEROSOL, METERED RESPIRATORY (INHALATION) EVERY 5 MIN PRN
Status: DISCONTINUED | OUTPATIENT
Start: 2018-10-05 | End: 2018-10-06 | Stop reason: HOSPADM

## 2018-10-05 RX ORDER — AMINOPHYLLINE 25 MG/ML
50-100 INJECTION, SOLUTION INTRAVENOUS
Status: DISCONTINUED | OUTPATIENT
Start: 2018-10-05 | End: 2018-10-06 | Stop reason: HOSPADM

## 2018-10-05 RX ORDER — ACYCLOVIR 200 MG/1
0-1 CAPSULE ORAL
Status: DISCONTINUED | OUTPATIENT
Start: 2018-10-05 | End: 2018-10-06 | Stop reason: HOSPADM

## 2018-10-05 RX ORDER — REGADENOSON 0.08 MG/ML
0.4 INJECTION, SOLUTION INTRAVENOUS ONCE
Status: COMPLETED | OUTPATIENT
Start: 2018-10-05 | End: 2018-10-05

## 2018-10-05 RX ADMIN — TETROFOSMIN 3.3 MCI.: 1.38 INJECTION, POWDER, LYOPHILIZED, FOR SOLUTION INTRAVENOUS at 08:35

## 2018-10-05 RX ADMIN — REGADENOSON 0.4 MG: 0.08 INJECTION, SOLUTION INTRAVENOUS at 09:57

## 2018-10-05 RX ADMIN — TETROFOSMIN 9.24 MCI.: 1.38 INJECTION, POWDER, LYOPHILIZED, FOR SOLUTION INTRAVENOUS at 10:02

## 2018-10-05 NOTE — TELEPHONE ENCOUNTER
I called pt w/ results of nuc stress test. No answer. Left VM.   Pre-existing abn stable, but new abn noted.   She has appt w/ Cardiology on 10/11/2018.  Recommended she continue to take her daily ASA and keep her appt in Cardiology next week. To ED if any symptoms.   Results released to My Chart and copy sent to her cardiologist.       Addendum: 15:05  Maribel called back. We reviewed results of her study.   Notes her diet has not been GERD friendly lately.   Walks every day. She has no CP when she walks - in fact, feels a little better.   She has nitroglycerin at home. Has never taken it.   If has GERD symptoms or CP which is relieved by nitroglycerin, this is more suggestive of heart disease and she will go to ED.  Set expectations she will likely need angio.

## 2018-10-08 ENCOUNTER — TELEPHONE (OUTPATIENT)
Dept: CARDIOLOGY | Facility: CLINIC | Age: 58
End: 2018-10-08

## 2018-10-08 ENCOUNTER — OFFICE VISIT (OUTPATIENT)
Dept: CARDIOLOGY | Facility: CLINIC | Age: 58
End: 2018-10-08
Payer: COMMERCIAL

## 2018-10-08 VITALS
DIASTOLIC BLOOD PRESSURE: 78 MMHG | BODY MASS INDEX: 19.88 KG/M2 | WEIGHT: 123.7 LBS | HEIGHT: 66 IN | HEART RATE: 60 BPM | SYSTOLIC BLOOD PRESSURE: 138 MMHG

## 2018-10-08 DIAGNOSIS — I99.8 OTHER DISORDER OF CIRCULATORY SYSTEM: ICD-10-CM

## 2018-10-08 DIAGNOSIS — R94.39 ABNORMAL CARDIOVASCULAR STRESS TEST: ICD-10-CM

## 2018-10-08 DIAGNOSIS — R94.39 ABNORMAL NUCLEAR STRESS TEST: Primary | ICD-10-CM

## 2018-10-08 DIAGNOSIS — I25.10 CORONARY ARTERY DISEASE INVOLVING NATIVE CORONARY ARTERY OF NATIVE HEART WITHOUT ANGINA PECTORIS: Primary | ICD-10-CM

## 2018-10-08 DIAGNOSIS — R94.39 ABNORMAL NUCLEAR STRESS TEST: ICD-10-CM

## 2018-10-08 LAB
ANION GAP SERPL CALCULATED.3IONS-SCNC: 4 MMOL/L (ref 3–14)
APTT PPP: 26 SEC (ref 22–37)
BUN SERPL-MCNC: 16 MG/DL (ref 7–30)
CALCIUM SERPL-MCNC: 9.1 MG/DL (ref 8.5–10.1)
CHLORIDE SERPL-SCNC: 104 MMOL/L (ref 94–109)
CO2 SERPL-SCNC: 30 MMOL/L (ref 20–32)
CREAT SERPL-MCNC: 0.78 MG/DL (ref 0.52–1.04)
ERYTHROCYTE [DISTWIDTH] IN BLOOD BY AUTOMATED COUNT: 12.1 % (ref 10–15)
GFR SERPL CREATININE-BSD FRML MDRD: 75 ML/MIN/1.7M2
GLUCOSE SERPL-MCNC: 85 MG/DL (ref 70–99)
HCT VFR BLD AUTO: 41.2 % (ref 35–47)
HGB BLD-MCNC: 13.5 G/DL (ref 11.7–15.7)
INR PPP: 1.01 (ref 0.86–1.14)
MCH RBC QN AUTO: 30.3 PG (ref 26.5–33)
MCHC RBC AUTO-ENTMCNC: 32.8 G/DL (ref 31.5–36.5)
MCV RBC AUTO: 92 FL (ref 78–100)
PLATELET # BLD AUTO: 186 10E9/L (ref 150–450)
POTASSIUM SERPL-SCNC: 4 MMOL/L (ref 3.4–5.3)
RBC # BLD AUTO: 4.46 10E12/L (ref 3.8–5.2)
SODIUM SERPL-SCNC: 138 MMOL/L (ref 133–144)
WBC # BLD AUTO: 4.1 10E9/L (ref 4–11)

## 2018-10-08 PROCEDURE — 85610 PROTHROMBIN TIME: CPT | Performed by: INTERNAL MEDICINE

## 2018-10-08 PROCEDURE — 85730 THROMBOPLASTIN TIME PARTIAL: CPT | Performed by: INTERNAL MEDICINE

## 2018-10-08 PROCEDURE — 36415 COLL VENOUS BLD VENIPUNCTURE: CPT | Performed by: INTERNAL MEDICINE

## 2018-10-08 PROCEDURE — 80048 BASIC METABOLIC PNL TOTAL CA: CPT | Performed by: INTERNAL MEDICINE

## 2018-10-08 PROCEDURE — 85027 COMPLETE CBC AUTOMATED: CPT | Performed by: INTERNAL MEDICINE

## 2018-10-08 PROCEDURE — 99214 OFFICE O/P EST MOD 30 MIN: CPT | Performed by: INTERNAL MEDICINE

## 2018-10-08 RX ORDER — ACETAMINOPHEN 325 MG/1
325-650 TABLET ORAL EVERY 6 HOURS PRN
COMMUNITY

## 2018-10-08 RX ORDER — SODIUM CHLORIDE 9 MG/ML
INJECTION, SOLUTION INTRAVENOUS CONTINUOUS
Status: CANCELLED | OUTPATIENT
Start: 2018-10-08

## 2018-10-08 RX ORDER — LIDOCAINE 40 MG/G
CREAM TOPICAL
Status: CANCELLED | OUTPATIENT
Start: 2018-10-08

## 2018-10-08 RX ORDER — ASPIRIN 81 MG/1
81 TABLET ORAL DAILY
Status: CANCELLED | OUTPATIENT
Start: 2018-10-08

## 2018-10-08 RX ORDER — POTASSIUM CHLORIDE 750 MG/1
20 TABLET, EXTENDED RELEASE ORAL
Status: CANCELLED | OUTPATIENT
Start: 2018-10-08

## 2018-10-08 NOTE — LETTER
10/8/2018    Rosalba Liu MD  [address] [facility] [address]    RE: Glenny Butts       Dear Colleague,    I had the pleasure of seeing Glenny Butts in the [facility].    HPI and Plan:     Ms. Dorado is a pleasant 58-year-old female with a history of coronary artery disease with a prior small non-ST segment elevation MI for which she underwent PCI to the LAD due to a thrombotic occlusion in 2015.  She was recently seen by her primary care provider due to atypical chest discomfort and underwent a repeat nuclear stress test.  I am seeing her urgently today as her stress test was abnormal.    The patient had come home from a walk and within half an hour developed right-sided severe chest discomfort.  It did not feel like her prior anginal symptoms, however, it was similar to an episode in 2017 which resulted in a vasovagal episode of syncope.  Over the weekend she has been having some left arm pain which is her prior anginal symptoms but the patient is also been quite nervous.    Her nuclear stress test shows a moderate to large area of ischemia involving the anteroseptal wall and apex along with a small area of ischemia involving the base and mid anterolateral wall.  There is evidence for a apical infarction with mild funmilayo-infarct ischemia.  I personally reviewed the images and feel that it is not equalized properly.  The degree of ischemia does not appear to be as significant.    Orders Placed This Encounter   Procedures     Follow-Up with Cardiologist       Orders Placed This Encounter   Medications     acetaminophen (TYLENOL) 325 MG tablet     Sig: Take 325-650 mg by mouth every 6 hours as needed for mild pain       There are no discontinued medications.      Encounter Diagnoses   Name Primary?     Coronary artery disease involving native coronary artery of native heart without angina pectoris Yes     Abnormal cardiovascular stress test        CURRENT  MEDICATIONS:  Current Outpatient Prescriptions   Medication Sig Dispense Refill     acetaminophen (TYLENOL) 325 MG tablet Take 325-650 mg by mouth every 6 hours as needed for mild pain       ASPIRIN PO Take 81 mg by mouth daily       atorvastatin (LIPITOR) 40 MG tablet Take 1 tablet (40 mg) by mouth daily 90 tablet 3     DiphenhydrAMINE HCl (BENADRYL PO) Take 25 mg by mouth as needed Reported on 2/17/2017       doxylamine (UNISOM) 25 MG TABS Take 12.5 mg by mouth nightly as needed        lisinopril (PRINIVIL/ZESTRIL) 2.5 MG tablet Take 1 tablet (2.5 mg) by mouth daily 90 tablet 3     nitroglycerin (NITROSTAT) 0.4 MG SL tablet Place 1 tablet (0.4 mg) under the tongue every 5 minutes as needed for chest pain If you are still having symptoms after 3 doses (15 minutes) call 911. 25 tablet 0     omeprazole 20 MG tablet Take 20 mg by mouth as needed       Vitamin D, Cholecalciferol, 400 units TABS Take 800 Units by mouth daily         ALLERGIES     Allergies   Allergen Reactions     Dust Mites      Grass      Trees        PAST MEDICAL HISTORY:  Past Medical History:   Diagnosis Date     CAD (coronary artery disease) 2/15    Stent to LAD 01/2015     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      Hyperlipidemia      Ischemic cardiomyopathy      NSTEMI (non-ST elevated myocardial infarction) (H) 2/15     S/P coronary angiogram 10-5-2015    patent prev placed stent-aggressive medical management     Stress incontinence      Syncope        PAST SURGICAL HISTORY:  Past Surgical History:   Procedure Laterality Date     C DEXA, BONE DENSITY, AXIAL SKEL       CHOLECYSTECTOMY       COLONOSCOPY       HEART CATH, ANGIOPLASTY  1/27/15     mid LAD-2.75 X 28 mm Promus premier FAM      HYSTERECTOMY VAGINAL N/A 12/19/2014    Procedure: HYSTERECTOMY VAGINAL;  Surgeon: Aarti Ramirez MD;  Location: [facility] OR     wisdom teeth         FAMILY HISTORY:  Family History   Problem Relation Age of Onset     Dementia Mother      Myocardial Infarction  "Father 34     MI     Hypertension Brother      Hypertension Brother        SOCIAL HISTORY:  Social History     Social History     Marital status:      Spouse name: N/A     Number of children: N/A     Years of education: N/A     Social History Main Topics     Smoking status: Never Smoker     Smokeless tobacco: Never Used     Alcohol use Yes      Comment: 4 drinks per month     Drug use: No     Sexual activity: Not Asked     Other Topics Concern     Caffeine Concern No     cup of tea, no coffee     Sleep Concern Yes     takes OTC med     Stress Concern No     Weight Concern No     Special Diet Yes     LOW FAT, low sugar     Exercise Yes      4 times week, brisk walking treadmill, elyptical     Seat Belt Yes     Parent/Sibling W/ Cabg, Mi Or Angioplasty Before 65f 55m? Yes     Social History Narrative       Review of Systems:  Skin:  Negative       Eyes:  Positive for contacts left eye contact  ENT:  Negative      Respiratory:  Negative       Cardiovascular:    Positive for;chest pain;palpitations    Gastroenterology: Positive for heartburn;reflux    Genitourinary:  Positive for urinary frequency    Musculoskeletal:  Positive for   left shoulder pain  Neurologic:  Positive for headaches    Psychiatric:  Positive for excessive stress    Heme/Lymph/Imm:  Positive for allergies;hay fever    Endocrine:  Negative        Physical Exam:  Vitals: /78 (BP Location: Right arm, Patient Position: Chair, Cuff Size: Adult Small)  Pulse 60  Ht 1.676 m (5' 6\")  Wt 56.1 kg (123 lb 11.2 oz)  LMP 08/20/2014 (Exact Date)  BMI 19.97 kg/m2    Constitutional:  cooperative;in no acute distress        Skin:  warm and dry to the touch          Head:  normocephalic        Eyes:  sclera white        Lymph:No Cervical lymphadenopathy present     ENT:  no pallor or cyanosis        Neck:  JVP normal        Respiratory:  clear to auscultation         Cardiac: regular rhythm;normal S1 and S2                pulses full and equal       "                             Right groin site has dime size bump and diffuse ecchymosis, no bruit    GI:  abdomen soft        Extremities and Muscular Skeletal:  no edema              Neurological:  no gross motor deficits;affect appropriate        Psych:  affect appropriate, oriented to time, person and place      Impression plan:    Ms. Dorado is a pleasant 58-year-old female with a history of coronary artery disease status post PCI to the LAD due to a thrombotic occlusion resulting in a non-ST segment elevation MI in 2015.  She presented recently to her family doctor with a episode of atypical chest pain and underwent a nuclear stress test showing LAD ischemia.  At this time I have recommended a cardiac catheterization as the patient's risk factors are optimized and given the degree of ischemia noted.  Informed consent was obtained after explaining the risks and benefits of the cardiac catheterization with the patient who is in agreement.  I will plan to see the patient back after her stress test has been completed.    CC  Janes Camarillo MD  [address]  [place] [id number]                Thank you for allowing me to participate in the care of your patient.      Sincerely,     Janes Camarillo MD     [facility]    cc:   Janes Camarillo MD  [address]  [place] [id number]

## 2018-10-08 NOTE — LETTER
10/8/2018    Rosalba Liu MD  [address] [facility] [address]    RE: Glenny Butts       Dear Colleague,    I had the pleasure of seeing Glenny Butts in the [facility].    HPI and Plan:     Ms. Dorado is a pleasant 58-year-old female with a history of coronary artery disease with a prior small non-ST segment elevation MI for which she underwent PCI to the LAD due to a thrombotic occlusion in 2015.  She was recently seen by her primary care provider due to atypical chest discomfort and underwent a repeat nuclear stress test.  I am seeing her urgently today as her stress test was abnormal.    The patient had come home from a walk and within half an hour developed right-sided severe chest discomfort.  It did not feel like her prior anginal symptoms, however, it was similar to an episode in 2017 which resulted in a vasovagal episode of syncope.  Over the weekend she has been having some left arm pain which is her prior anginal symptoms but the patient is also been quite nervous.    Her nuclear stress test shows a moderate to large area of ischemia involving the anteroseptal wall and apex along with a small area of ischemia involving the base and mid anterolateral wall.  There is evidence for a apical infarction with mild funmilayo-infarct ischemia.  I personally reviewed the images and feel that it is not equalized properly.  The degree of ischemia does not appear to be as significant.    Orders Placed This Encounter   Procedures     Follow-Up with Cardiologist       Orders Placed This Encounter   Medications     acetaminophen (TYLENOL) 325 MG tablet     Sig: Take 325-650 mg by mouth every 6 hours as needed for mild pain       There are no discontinued medications.      Encounter Diagnoses   Name Primary?     Coronary artery disease involving native coronary artery of native heart without angina pectoris Yes     Abnormal cardiovascular stress test        CURRENT  MEDICATIONS:  Current Outpatient Prescriptions   Medication Sig Dispense Refill     acetaminophen (TYLENOL) 325 MG tablet Take 325-650 mg by mouth every 6 hours as needed for mild pain       ASPIRIN PO Take 81 mg by mouth daily       atorvastatin (LIPITOR) 40 MG tablet Take 1 tablet (40 mg) by mouth daily 90 tablet 3     DiphenhydrAMINE HCl (BENADRYL PO) Take 25 mg by mouth as needed Reported on 2/17/2017       doxylamine (UNISOM) 25 MG TABS Take 12.5 mg by mouth nightly as needed        lisinopril (PRINIVIL/ZESTRIL) 2.5 MG tablet Take 1 tablet (2.5 mg) by mouth daily 90 tablet 3     nitroglycerin (NITROSTAT) 0.4 MG SL tablet Place 1 tablet (0.4 mg) under the tongue every 5 minutes as needed for chest pain If you are still having symptoms after 3 doses (15 minutes) call 911. 25 tablet 0     omeprazole 20 MG tablet Take 20 mg by mouth as needed       Vitamin D, Cholecalciferol, 400 units TABS Take 800 Units by mouth daily         ALLERGIES     Allergies   Allergen Reactions     Dust Mites      Grass      Trees        PAST MEDICAL HISTORY:  Past Medical History:   Diagnosis Date     CAD (coronary artery disease) 2/15    Stent to LAD 01/2015     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      Hyperlipidemia      Ischemic cardiomyopathy      NSTEMI (non-ST elevated myocardial infarction) (H) 2/15     S/P coronary angiogram 10-5-2015    patent prev placed stent-aggressive medical management     Stress incontinence      Syncope        PAST SURGICAL HISTORY:  Past Surgical History:   Procedure Laterality Date     C DEXA, BONE DENSITY, AXIAL SKEL       CHOLECYSTECTOMY       COLONOSCOPY       HEART CATH, ANGIOPLASTY  1/27/15     mid LAD-2.75 X 28 mm Promus premier FAM      HYSTERECTOMY VAGINAL N/A 12/19/2014    Procedure: HYSTERECTOMY VAGINAL;  Surgeon: Aarti Ramirez MD;  Location: [facility] OR     wisdom teeth         FAMILY HISTORY:  Family History   Problem Relation Age of Onset     Dementia Mother      Myocardial Infarction  "Father 34     MI     Hypertension Brother      Hypertension Brother        SOCIAL HISTORY:  Social History     Social History     Marital status:      Spouse name: N/A     Number of children: N/A     Years of education: N/A     Social History Main Topics     Smoking status: Never Smoker     Smokeless tobacco: Never Used     Alcohol use Yes      Comment: 4 drinks per month     Drug use: No     Sexual activity: Not Asked     Other Topics Concern     Caffeine Concern No     cup of tea, no coffee     Sleep Concern Yes     takes OTC med     Stress Concern No     Weight Concern No     Special Diet Yes     LOW FAT, low sugar     Exercise Yes      4 times week, brisk walking treadmill, elyptical     Seat Belt Yes     Parent/Sibling W/ Cabg, Mi Or Angioplasty Before 65f 55m? Yes     Social History Narrative       Review of Systems:  Skin:  Negative       Eyes:  Positive for contacts left eye contact  ENT:  Negative      Respiratory:  Negative       Cardiovascular:    Positive for;chest pain;palpitations    Gastroenterology: Positive for heartburn;reflux    Genitourinary:  Positive for urinary frequency    Musculoskeletal:  Positive for   left shoulder pain  Neurologic:  Positive for headaches    Psychiatric:  Positive for excessive stress    Heme/Lymph/Imm:  Positive for allergies;hay fever    Endocrine:  Negative        Physical Exam:  Vitals: /78 (BP Location: Right arm, Patient Position: Chair, Cuff Size: Adult Small)  Pulse 60  Ht 1.676 m (5' 6\")  Wt 56.1 kg (123 lb 11.2 oz)  LMP 08/20/2014 (Exact Date)  BMI 19.97 kg/m2    Constitutional:  cooperative;in no acute distress        Skin:  warm and dry to the touch          Head:  normocephalic        Eyes:  sclera white        Lymph:No Cervical lymphadenopathy present     ENT:  no pallor or cyanosis        Neck:  JVP normal        Respiratory:  clear to auscultation         Cardiac: regular rhythm;normal S1 and S2                pulses full and equal       "                             Right groin site has dime size bump and diffuse ecchymosis, no bruit    GI:  abdomen soft        Extremities and Muscular Skeletal:  no edema              Neurological:  no gross motor deficits;affect appropriate        Psych:  affect appropriate, oriented to time, person and place      Impression plan:    Ms. Dorado is a pleasant 58-year-old female with a history of coronary artery disease status post PCI to the LAD due to a thrombotic occlusion resulting in a non-ST segment elevation MI in 2015.  She presented recently to her family doctor with a episode of atypical chest pain and underwent a nuclear stress test showing LAD ischemia.  At this time I have recommended a cardiac catheterization as the patient's risk factors are optimized and given the degree of ischemia noted.  Informed consent was obtained after explaining the risks and benefits of the cardiac catheterization with the patient who is in agreement.  I will plan to see the patient back after her stress test has been completed.        Thank you for allowing me to participate in the care of your patient.    Sincerely,     Janes Camarillo MD     [facility]

## 2018-10-08 NOTE — PROGRESS NOTES
HPI and Plan:     Ms. Dorado is a pleasant 58-year-old female with a history of coronary artery disease with a prior small non-ST segment elevation MI for which she underwent PCI to the LAD due to a thrombotic occlusion in 2015.  She was recently seen by her primary care provider due to atypical chest discomfort and underwent a repeat nuclear stress test.  I am seeing her urgently today as her stress test was abnormal.    The patient had come home from a walk and within half an hour developed right-sided severe chest discomfort.  It did not feel like her prior anginal symptoms, however, it was similar to an episode in 2017 which resulted in a vasovagal episode of syncope.  Over the weekend she has been having some left arm pain which is her prior anginal symptoms but the patient is also been quite nervous.    Her nuclear stress test shows a moderate to large area of ischemia involving the anteroseptal wall and apex along with a small area of ischemia involving the base and mid anterolateral wall.  There is evidence for a apical infarction with mild funmilayo-infarct ischemia.  I personally reviewed the images and feel that it is not equalized properly.  The degree of ischemia does not appear to be as significant.    Orders Placed This Encounter   Procedures     Follow-Up with Cardiologist       Orders Placed This Encounter   Medications     acetaminophen (TYLENOL) 325 MG tablet     Sig: Take 325-650 mg by mouth every 6 hours as needed for mild pain       There are no discontinued medications.      Encounter Diagnoses   Name Primary?     Coronary artery disease involving native coronary artery of native heart without angina pectoris Yes     Abnormal cardiovascular stress test        CURRENT MEDICATIONS:  Current Outpatient Prescriptions   Medication Sig Dispense Refill     acetaminophen (TYLENOL) 325 MG tablet Take 325-650 mg by mouth every 6 hours as needed for mild pain       ASPIRIN PO Take 81 mg by mouth daily        atorvastatin (LIPITOR) 40 MG tablet Take 1 tablet (40 mg) by mouth daily 90 tablet 3     DiphenhydrAMINE HCl (BENADRYL PO) Take 25 mg by mouth as needed Reported on 2/17/2017       doxylamine (UNISOM) 25 MG TABS Take 12.5 mg by mouth nightly as needed        lisinopril (PRINIVIL/ZESTRIL) 2.5 MG tablet Take 1 tablet (2.5 mg) by mouth daily 90 tablet 3     nitroglycerin (NITROSTAT) 0.4 MG SL tablet Place 1 tablet (0.4 mg) under the tongue every 5 minutes as needed for chest pain If you are still having symptoms after 3 doses (15 minutes) call 911. 25 tablet 0     omeprazole 20 MG tablet Take 20 mg by mouth as needed       Vitamin D, Cholecalciferol, 400 units TABS Take 800 Units by mouth daily         ALLERGIES     Allergies   Allergen Reactions     Dust Mites      Grass      Trees        PAST MEDICAL HISTORY:  Past Medical History:   Diagnosis Date     CAD (coronary artery disease) 2/15    Stent to LAD 01/2015     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      Hyperlipidemia      Ischemic cardiomyopathy      NSTEMI (non-ST elevated myocardial infarction) (H) 2/15     S/P coronary angiogram 10-5-2015    patent prev placed stent-aggressive medical management     Stress incontinence      Syncope        PAST SURGICAL HISTORY:  Past Surgical History:   Procedure Laterality Date     C DEXA, BONE DENSITY, AXIAL SKEL       CHOLECYSTECTOMY       COLONOSCOPY       HEART CATH, ANGIOPLASTY  1/27/15     mid LAD-2.75 X 28 mm Promus premier FAM      HYSTERECTOMY VAGINAL N/A 12/19/2014    Procedure: HYSTERECTOMY VAGINAL;  Surgeon: Aarti Ramirez MD;  Location: [facility] OR     wisdom teeth         FAMILY HISTORY:  Family History   Problem Relation Age of Onset     Dementia Mother      Myocardial Infarction Father 34     MI     Hypertension Brother      Hypertension Brother        SOCIAL HISTORY:  Social History     Social History     Marital status:      Spouse name: N/A     Number of children: N/A     Years of education: N/A  "    Social History Main Topics     Smoking status: Never Smoker     Smokeless tobacco: Never Used     Alcohol use Yes      Comment: 4 drinks per month     Drug use: No     Sexual activity: Not Asked     Other Topics Concern     Caffeine Concern No     cup of tea, no coffee     Sleep Concern Yes     takes OTC med     Stress Concern No     Weight Concern No     Special Diet Yes     LOW FAT, low sugar     Exercise Yes      4 times week, brisk walking treadmill, elyptical     Seat Belt Yes     Parent/Sibling W/ Cabg, Mi Or Angioplasty Before 65f 55m? Yes     Social History Narrative       Review of Systems:  Skin:  Negative       Eyes:  Positive for contacts left eye contact  ENT:  Negative      Respiratory:  Negative       Cardiovascular:    Positive for;chest pain;palpitations    Gastroenterology: Positive for heartburn;reflux    Genitourinary:  Positive for urinary frequency    Musculoskeletal:  Positive for   left shoulder pain  Neurologic:  Positive for headaches    Psychiatric:  Positive for excessive stress    Heme/Lymph/Imm:  Positive for allergies;hay fever    Endocrine:  Negative        Physical Exam:  Vitals: /78 (BP Location: Right arm, Patient Position: Chair, Cuff Size: Adult Small)  Pulse 60  Ht 1.676 m (5' 6\")  Wt 56.1 kg (123 lb 11.2 oz)  LMP 08/20/2014 (Exact Date)  BMI 19.97 kg/m2    Constitutional:  cooperative;in no acute distress        Skin:  warm and dry to the touch          Head:  normocephalic        Eyes:  sclera white        Lymph:No Cervical lymphadenopathy present     ENT:  no pallor or cyanosis        Neck:  JVP normal        Respiratory:  clear to auscultation         Cardiac: regular rhythm;normal S1 and S2                pulses full and equal                                   Right groin site has dime size bump and diffuse ecchymosis, no bruit    GI:  abdomen soft        Extremities and Muscular Skeletal:  no edema              Neurological:  no gross motor deficits;affect " appropriate        Psych:  affect appropriate, oriented to time, person and place      Impression plan:    Ms. Dorado is a pleasant 58-year-old female with a history of coronary artery disease status post PCI to the LAD due to a thrombotic occlusion resulting in a non-ST segment elevation MI in 2015.  She presented recently to her family doctor with a episode of atypical chest pain and underwent a nuclear stress test showing LAD ischemia.  At this time I have recommended a cardiac catheterization as the patient's risk factors are optimized and given the degree of ischemia noted.  Informed consent was obtained after explaining the risks and benefits of the cardiac catheterization with the patient who is in agreement.  I will plan to see the patient back after her stress test has been completed.    CC  Janes Camarillo MD  [address]  [place] [id number]

## 2018-10-08 NOTE — PATIENT INSTRUCTIONS
Coronary angiogram/[facility] prep instructions.     Patient is scheduled for a left heart cath/coronary angiogram at [facility], on 10/10/18. Check in time is at 9am and procedure time is at 11am.  Advised patient not eat or drink after midnight on 10/9/18 .      Patient is not taking Metformin or other diabetic medications.     Patient is not taking an anticoagulant.     Patient should continue their current dose of Aspirin with their other daily medications the morning of the procedure with small sips of water.     Verified patient does not have an allergy to contrast dye.     Verified patient has someone available to drive them home from the hospital and can stay with them for 24 hours after the procedure.     This is a same day procedure, however advised to should pack an overnight bag just in case she may need to stay overnight.    Patient had no questions about their prep instructions.   Lisa CUETO  [facility]

## 2018-10-08 NOTE — MR AVS SNAPSHOT
After Visit Summary   10/8/2018    Glenny Butts    MRN: [id number]           Patient Information     Date Of Birth          1960        Visit Information        Provider Department      10/8/2018 8:15 AM Janes Camarillo MD [facility]        Today's Diagnoses     Coronary artery disease involving native coronary artery of native heart without angina pectoris    -  1    Abnormal cardiovascular stress test           Follow-ups after your visit        Additional Services     Follow-Up with Cardiologist                 Your next 10 appointments already scheduled     Oct 10, 2018 11:00 AM CDT   Cath 90 Minute with [email or web address]   [facility] ([facility])    [address]  [place] [address]   [phone]            Oct 11, 2018  8:45 AM CDT   Return Visit with Janes Camarillo MD   [facility] ([facility])    [address]  [place] [address]   [phone] OPT 2            Oct 19, 2018  3:10 PM CDT   Return Visit with LIZ Romero   [facility] ([facility])    [address]  [place] [address]   [phone] OPT 2              Future tests that were ordered for you today     Open Future Orders        Priority Expected Expires Ordered    Basic metabolic panel Routine 10/8/2018 10/8/2019 10/8/2018    INR Routine 10/8/2018 10/8/2019 10/8/2018    CBC with platelets Routine 10/8/2018 10/8/2019 10/8/2018    Partial thromboplastin time Routine 10/8/2018 10/8/2019 10/8/2018    Follow-Up with Cardiologist Routine 10/22/2018 10/8/2019 10/8/2018    Cardiac Cath: Coronary Angiography Adult Order Routine 10/15/2018 10/8/2019 10/8/2018            Who to contact     If you have questions or need follow up information about today's clinic visit or your schedule please contact [facility]  "[facility] directly at [phone].  Normal or non-critical lab and imaging results will be communicated to you by MyChart, letter or phone within 4 business days after the clinic has received the results. If you do not hear from us within 7 days, please contact the clinic through MyChart or phone. If you have a critical or abnormal lab result, we will notify you by phone as soon as possible.  Submit refill requests through Be[employer]. or call your pharmacy and they will forward the refill request to us. Please allow 3 business days for your refill to be completed.          Additional Information About Your Visit        "[employer] Information     [employer]. gives you secure access to your electronic health record. If you see a primary care provider, you can also send messages to your care team and make appointments. If you have questions, please call your primary care clinic.  If you do not have a primary care provider, please call [phone] and they will assist you.        Care EveryWhere ID     This is your Care EveryWhere ID. This could be used by other organizations to access your [place] medical records  [id number]        Your Vitals Were     Pulse Height Last Period BMI (Body Mass Index)          60 1.676 m (5' 6\") 08/20/2014 (Exact Date) 19.97 kg/m2         Blood Pressure from Last 3 Encounters:   10/08/18 138/78   10/05/18 132/72   09/17/18 118/78    Weight from Last 3 Encounters:   10/08/18 56.1 kg (123 lb 11.2 oz)   09/17/18 55.7 kg (122 lb 12.8 oz)   03/13/18 56.2 kg (124 lb)               Primary Care Provider Office Phone # Fax #    Rosalba Daisy Liu [phone] [address] [facility] [address]        Equal Access to Services     AMY NI : Hadii aad ku hadasho Soomaali, waaxda luqadaha, qaybta kaalmada adeegyada, marci reza. So [facility] [phone].    ATENCIÓN: Si habla español, tiene a vicente disposición " servicios gratuitos de asistencia lingüística. Cyndy gomez [phone].    We comply with applicable federal civil rights laws and [place] laws. We do not discriminate on the basis of race, color, national origin, age, disability, sex, sexual orientation, or gender identity.            Thank you!     Thank you for choosing [facility]  for your care. Our goal is always to provide you with excellent care. Hearing back from our patients is one way we can continue to improve our services. Please take a few minutes to complete the written survey that you may receive in the mail after your visit with us. Thank you!             Your Updated Medication List - Protect others around you: Learn how to safely use, store and throw away your medicines at www.disposemymeds.org.          This list is accurate as of 10/8/18  9:00 AM.  Always use your most recent med list.                   Brand Name Dispense Instructions for use Diagnosis    ASPIRIN PO      Take 81 mg by mouth daily        atorvastatin 40 MG tablet    LIPITOR    90 tablet    Take 1 tablet (40 mg) by mouth daily    NSTEMI (non-ST elevated myocardial infarction) (H)       BENADRYL PO      Take 25 mg by mouth as needed Reported on 2/17/2017        doxylamine 25 MG Tabs tablet    UNISOM     Take 12.5 mg by mouth nightly as needed        lisinopril 2.5 MG tablet    PRINIVIL/Zestril    90 tablet    Take 1 tablet (2.5 mg) by mouth daily    Coronary artery disease involving native coronary artery of native heart without angina pectoris       nitroGLYcerin 0.4 MG sublingual tablet    NITROSTAT    25 tablet    Place 1 tablet (0.4 mg) under the tongue every 5 minutes as needed for chest pain If you are still having symptoms after 3 doses (15 minutes) call 911.    Chest pain       omeprazole 20 MG tablet      Take 20 mg by mouth as needed        TYLENOL 325 MG tablet   Generic drug:  acetaminophen      Take 325-650 mg by mouth every  6 hours as needed for mild pain        Vitamin D (Cholecalciferol) 400 units Tabs      Take 800 Units by mouth daily

## 2018-10-10 ENCOUNTER — HOSPITAL ENCOUNTER (OUTPATIENT)
Facility: CLINIC | Age: 58
Discharge: HOME | End: 2018-10-10
Attending: INTERNAL MEDICINE | Admitting: INTERNAL MEDICINE
Payer: COMMERCIAL

## 2018-10-10 ENCOUNTER — APPOINTMENT (OUTPATIENT)
Dept: CARDIOLOGY | Facility: CLINIC | Age: 58
End: 2018-10-10
Attending: INTERNAL MEDICINE
Payer: COMMERCIAL

## 2018-10-10 VITALS
DIASTOLIC BLOOD PRESSURE: 56 MMHG | SYSTOLIC BLOOD PRESSURE: 92 MMHG | HEIGHT: 66 IN | WEIGHT: 122.7 LBS | OXYGEN SATURATION: 96 % | HEART RATE: 57 BPM | TEMPERATURE: 97.6 F | RESPIRATION RATE: 16 BRPM | BODY MASS INDEX: 19.72 KG/M2

## 2018-10-10 DIAGNOSIS — R94.39 ABNORMAL CARDIOVASCULAR STRESS TEST: ICD-10-CM

## 2018-10-10 PROCEDURE — 93010 ELECTROCARDIOGRAM REPORT: CPT | Performed by: INTERNAL MEDICINE

## 2018-10-10 PROCEDURE — 99153 MOD SED SAME PHYS/QHP EA: CPT

## 2018-10-10 PROCEDURE — 40000235 ZZH STATISTIC TELEMETRY

## 2018-10-10 PROCEDURE — 27210787 ZZH MANIFOLD CR2

## 2018-10-10 PROCEDURE — 93005 ELECTROCARDIOGRAM TRACING: CPT

## 2018-10-10 PROCEDURE — 99152 MOD SED SAME PHYS/QHP 5/>YRS: CPT

## 2018-10-10 PROCEDURE — 25000128 H RX IP 250 OP 636: Performed by: INTERNAL MEDICINE

## 2018-10-10 PROCEDURE — 27210946 ZZH KIT HC TOTES DISP CR8

## 2018-10-10 PROCEDURE — 25000125 ZZHC RX 250: Performed by: INTERNAL MEDICINE

## 2018-10-10 PROCEDURE — 40000065 ZZH STATISTIC EKG NON-CHARGEABLE

## 2018-10-10 PROCEDURE — 27211089 ZZH KIT ACIST INJECTOR CR3

## 2018-10-10 PROCEDURE — 99152 MOD SED SAME PHYS/QHP 5/>YRS: CPT | Performed by: INTERNAL MEDICINE

## 2018-10-10 PROCEDURE — 93454 CORONARY ARTERY ANGIO S&I: CPT

## 2018-10-10 PROCEDURE — 40000852 ZZH STATISTIC HEART CATH LAB OR EP LAB

## 2018-10-10 PROCEDURE — 27210795 ZZH PAD DEFIB QUICK CR4

## 2018-10-10 PROCEDURE — 93454 CORONARY ARTERY ANGIO S&I: CPT | Mod: 26 | Performed by: INTERNAL MEDICINE

## 2018-10-10 RX ORDER — ASPIRIN 325 MG
325 TABLET ORAL
Status: DISCONTINUED | OUTPATIENT
Start: 2018-10-10 | End: 2018-10-10 | Stop reason: HOSPADM

## 2018-10-10 RX ORDER — PROTAMINE SULFATE 10 MG/ML
1-5 INJECTION, SOLUTION INTRAVENOUS
Status: DISCONTINUED | OUTPATIENT
Start: 2018-10-10 | End: 2018-10-10 | Stop reason: HOSPADM

## 2018-10-10 RX ORDER — NITROGLYCERIN 5 MG/ML
100-500 VIAL (ML) INTRAVENOUS
Status: DISCONTINUED | OUTPATIENT
Start: 2018-10-10 | End: 2018-10-10 | Stop reason: HOSPADM

## 2018-10-10 RX ORDER — ASPIRIN 81 MG/1
81 TABLET ORAL DAILY
Status: DISCONTINUED | OUTPATIENT
Start: 2018-10-10 | End: 2018-10-10 | Stop reason: HOSPADM

## 2018-10-10 RX ORDER — CLOPIDOGREL BISULFATE 75 MG/1
75 TABLET ORAL
Status: DISCONTINUED | OUTPATIENT
Start: 2018-10-10 | End: 2018-10-10 | Stop reason: HOSPADM

## 2018-10-10 RX ORDER — LIDOCAINE 40 MG/G
CREAM TOPICAL
Status: DISCONTINUED | OUTPATIENT
Start: 2018-10-10 | End: 2018-10-10 | Stop reason: HOSPADM

## 2018-10-10 RX ORDER — SODIUM CHLORIDE 9 MG/ML
INJECTION, SOLUTION INTRAVENOUS CONTINUOUS
Status: DISCONTINUED | OUTPATIENT
Start: 2018-10-10 | End: 2018-10-10 | Stop reason: HOSPADM

## 2018-10-10 RX ORDER — METHYLPREDNISOLONE SODIUM SUCCINATE 125 MG/2ML
125 INJECTION, POWDER, LYOPHILIZED, FOR SOLUTION INTRAMUSCULAR; INTRAVENOUS
Status: DISCONTINUED | OUTPATIENT
Start: 2018-10-10 | End: 2018-10-10 | Stop reason: HOSPADM

## 2018-10-10 RX ORDER — IOPAMIDOL 755 MG/ML
40 INJECTION, SOLUTION INTRAVASCULAR ONCE
Status: COMPLETED | OUTPATIENT
Start: 2018-10-10 | End: 2018-10-10

## 2018-10-10 RX ORDER — HEPARIN SODIUM 1000 [USP'U]/ML
1000-10000 INJECTION, SOLUTION INTRAVENOUS; SUBCUTANEOUS EVERY 5 MIN PRN
Status: DISCONTINUED | OUTPATIENT
Start: 2018-10-10 | End: 2018-10-10 | Stop reason: HOSPADM

## 2018-10-10 RX ORDER — PROTAMINE SULFATE 10 MG/ML
25-100 INJECTION, SOLUTION INTRAVENOUS EVERY 5 MIN PRN
Status: DISCONTINUED | OUTPATIENT
Start: 2018-10-10 | End: 2018-10-10 | Stop reason: HOSPADM

## 2018-10-10 RX ORDER — METOPROLOL TARTRATE 1 MG/ML
5 INJECTION, SOLUTION INTRAVENOUS EVERY 5 MIN PRN
Status: DISCONTINUED | OUTPATIENT
Start: 2018-10-10 | End: 2018-10-10 | Stop reason: HOSPADM

## 2018-10-10 RX ORDER — FLUMAZENIL 0.1 MG/ML
0.2 INJECTION, SOLUTION INTRAVENOUS
Status: DISCONTINUED | OUTPATIENT
Start: 2018-10-10 | End: 2018-10-10 | Stop reason: HOSPADM

## 2018-10-10 RX ORDER — EPINEPHRINE 1 MG/ML
0.3 INJECTION, SOLUTION, CONCENTRATE INTRAVENOUS
Status: DISCONTINUED | OUTPATIENT
Start: 2018-10-10 | End: 2018-10-10 | Stop reason: HOSPADM

## 2018-10-10 RX ORDER — PRASUGREL 10 MG/1
10-60 TABLET, FILM COATED ORAL
Status: DISCONTINUED | OUTPATIENT
Start: 2018-10-10 | End: 2018-10-10 | Stop reason: HOSPADM

## 2018-10-10 RX ORDER — FENTANYL CITRATE 50 UG/ML
25-50 INJECTION, SOLUTION INTRAMUSCULAR; INTRAVENOUS
Status: DISCONTINUED | OUTPATIENT
Start: 2018-10-10 | End: 2018-10-10 | Stop reason: HOSPADM

## 2018-10-10 RX ORDER — NALOXONE HYDROCHLORIDE 0.4 MG/ML
.1-.4 INJECTION, SOLUTION INTRAMUSCULAR; INTRAVENOUS; SUBCUTANEOUS
Status: DISCONTINUED | OUTPATIENT
Start: 2018-10-10 | End: 2018-10-10 | Stop reason: HOSPADM

## 2018-10-10 RX ORDER — BUPIVACAINE HYDROCHLORIDE 2.5 MG/ML
1-10 INJECTION, SOLUTION EPIDURAL; INFILTRATION; INTRACAUDAL
Status: DISCONTINUED | OUTPATIENT
Start: 2018-10-10 | End: 2018-10-10 | Stop reason: HOSPADM

## 2018-10-10 RX ORDER — LIDOCAINE HYDROCHLORIDE 10 MG/ML
30 INJECTION, SOLUTION EPIDURAL; INFILTRATION; INTRACAUDAL; PERINEURAL
Status: DISCONTINUED | OUTPATIENT
Start: 2018-10-10 | End: 2018-10-10 | Stop reason: HOSPADM

## 2018-10-10 RX ORDER — ASPIRIN 81 MG/1
81-324 TABLET, CHEWABLE ORAL
Status: DISCONTINUED | OUTPATIENT
Start: 2018-10-10 | End: 2018-10-10 | Stop reason: HOSPADM

## 2018-10-10 RX ORDER — NALOXONE HYDROCHLORIDE 0.4 MG/ML
.2-.4 INJECTION, SOLUTION INTRAMUSCULAR; INTRAVENOUS; SUBCUTANEOUS
Status: DISCONTINUED | OUTPATIENT
Start: 2018-10-10 | End: 2018-10-10 | Stop reason: HOSPADM

## 2018-10-10 RX ORDER — CLOPIDOGREL 300 MG/1
300-600 TABLET, FILM COATED ORAL
Status: DISCONTINUED | OUTPATIENT
Start: 2018-10-10 | End: 2018-10-10 | Stop reason: HOSPADM

## 2018-10-10 RX ORDER — ATROPINE SULFATE 0.1 MG/ML
.5-1 INJECTION INTRAVENOUS
Status: DISCONTINUED | OUTPATIENT
Start: 2018-10-10 | End: 2018-10-10 | Stop reason: HOSPADM

## 2018-10-10 RX ORDER — NICARDIPINE HYDROCHLORIDE 2.5 MG/ML
100 INJECTION INTRAVENOUS
Status: DISCONTINUED | OUTPATIENT
Start: 2018-10-10 | End: 2018-10-10 | Stop reason: HOSPADM

## 2018-10-10 RX ORDER — FUROSEMIDE 10 MG/ML
20-100 INJECTION INTRAMUSCULAR; INTRAVENOUS
Status: DISCONTINUED | OUTPATIENT
Start: 2018-10-10 | End: 2018-10-10 | Stop reason: HOSPADM

## 2018-10-10 RX ORDER — DOBUTAMINE HYDROCHLORIDE 200 MG/100ML
2-20 INJECTION INTRAVENOUS CONTINUOUS PRN
Status: DISCONTINUED | OUTPATIENT
Start: 2018-10-10 | End: 2018-10-10 | Stop reason: HOSPADM

## 2018-10-10 RX ORDER — POTASSIUM CHLORIDE 29.8 MG/ML
20 INJECTION INTRAVENOUS
Status: DISCONTINUED | OUTPATIENT
Start: 2018-10-10 | End: 2018-10-10 | Stop reason: HOSPADM

## 2018-10-10 RX ORDER — PHENYLEPHRINE HCL IN 0.9% NACL 1 MG/10 ML
20-100 SYRINGE (ML) INTRAVENOUS
Status: DISCONTINUED | OUTPATIENT
Start: 2018-10-10 | End: 2018-10-10 | Stop reason: HOSPADM

## 2018-10-10 RX ORDER — MORPHINE SULFATE 2 MG/ML
1-2 INJECTION, SOLUTION INTRAMUSCULAR; INTRAVENOUS EVERY 5 MIN PRN
Status: DISCONTINUED | OUTPATIENT
Start: 2018-10-10 | End: 2018-10-10 | Stop reason: HOSPADM

## 2018-10-10 RX ORDER — HYDROCODONE BITARTRATE AND ACETAMINOPHEN 5; 325 MG/1; MG/1
1-2 TABLET ORAL EVERY 4 HOURS PRN
Status: DISCONTINUED | OUTPATIENT
Start: 2018-10-10 | End: 2018-10-10 | Stop reason: HOSPADM

## 2018-10-10 RX ORDER — SODIUM NITROPRUSSIDE 25 MG/ML
100-200 INJECTION INTRAVENOUS
Status: DISCONTINUED | OUTPATIENT
Start: 2018-10-10 | End: 2018-10-10 | Stop reason: HOSPADM

## 2018-10-10 RX ORDER — NITROGLYCERIN 5 MG/ML
100-200 VIAL (ML) INTRAVENOUS
Status: DISCONTINUED | OUTPATIENT
Start: 2018-10-10 | End: 2018-10-10 | Stop reason: HOSPADM

## 2018-10-10 RX ORDER — ACETAMINOPHEN 325 MG/1
325-650 TABLET ORAL EVERY 4 HOURS PRN
Status: DISCONTINUED | OUTPATIENT
Start: 2018-10-10 | End: 2018-10-10 | Stop reason: HOSPADM

## 2018-10-10 RX ORDER — ONDANSETRON 2 MG/ML
4 INJECTION INTRAMUSCULAR; INTRAVENOUS EVERY 4 HOURS PRN
Status: DISCONTINUED | OUTPATIENT
Start: 2018-10-10 | End: 2018-10-10 | Stop reason: HOSPADM

## 2018-10-10 RX ORDER — HYDRALAZINE HYDROCHLORIDE 20 MG/ML
10-20 INJECTION INTRAMUSCULAR; INTRAVENOUS
Status: DISCONTINUED | OUTPATIENT
Start: 2018-10-10 | End: 2018-10-10 | Stop reason: HOSPADM

## 2018-10-10 RX ORDER — LORAZEPAM 2 MG/ML
.5-2 INJECTION INTRAMUSCULAR EVERY 4 HOURS PRN
Status: DISCONTINUED | OUTPATIENT
Start: 2018-10-10 | End: 2018-10-10 | Stop reason: HOSPADM

## 2018-10-10 RX ORDER — VERAPAMIL HYDROCHLORIDE 2.5 MG/ML
1-2.5 INJECTION, SOLUTION INTRAVENOUS
Status: DISCONTINUED | OUTPATIENT
Start: 2018-10-10 | End: 2018-10-10 | Stop reason: HOSPADM

## 2018-10-10 RX ORDER — NITROGLYCERIN 0.4 MG/1
0.4 TABLET SUBLINGUAL EVERY 5 MIN PRN
Status: DISCONTINUED | OUTPATIENT
Start: 2018-10-10 | End: 2018-10-10 | Stop reason: HOSPADM

## 2018-10-10 RX ORDER — ENALAPRILAT 1.25 MG/ML
1.25-2.5 INJECTION INTRAVENOUS
Status: DISCONTINUED | OUTPATIENT
Start: 2018-10-10 | End: 2018-10-10 | Stop reason: HOSPADM

## 2018-10-10 RX ORDER — ATROPINE SULFATE 0.1 MG/ML
0.5 INJECTION INTRAVENOUS EVERY 5 MIN PRN
Status: DISCONTINUED | OUTPATIENT
Start: 2018-10-10 | End: 2018-10-10 | Stop reason: HOSPADM

## 2018-10-10 RX ORDER — POTASSIUM CHLORIDE 1500 MG/1
20 TABLET, EXTENDED RELEASE ORAL
Status: DISCONTINUED | OUTPATIENT
Start: 2018-10-10 | End: 2018-10-10 | Stop reason: HOSPADM

## 2018-10-10 RX ORDER — DIPHENHYDRAMINE HYDROCHLORIDE 50 MG/ML
25-50 INJECTION INTRAMUSCULAR; INTRAVENOUS
Status: DISCONTINUED | OUTPATIENT
Start: 2018-10-10 | End: 2018-10-10 | Stop reason: HOSPADM

## 2018-10-10 RX ORDER — NALOXONE HYDROCHLORIDE 0.4 MG/ML
0.4 INJECTION, SOLUTION INTRAMUSCULAR; INTRAVENOUS; SUBCUTANEOUS EVERY 5 MIN PRN
Status: DISCONTINUED | OUTPATIENT
Start: 2018-10-10 | End: 2018-10-10 | Stop reason: HOSPADM

## 2018-10-10 RX ORDER — DEXTROSE MONOHYDRATE 25 G/50ML
12.5-5 INJECTION, SOLUTION INTRAVENOUS EVERY 30 MIN PRN
Status: DISCONTINUED | OUTPATIENT
Start: 2018-10-10 | End: 2018-10-10 | Stop reason: HOSPADM

## 2018-10-10 RX ORDER — POTASSIUM CHLORIDE 7.45 MG/ML
10 INJECTION INTRAVENOUS
Status: DISCONTINUED | OUTPATIENT
Start: 2018-10-10 | End: 2018-10-10 | Stop reason: HOSPADM

## 2018-10-10 RX ORDER — LIDOCAINE HYDROCHLORIDE 10 MG/ML
1-10 INJECTION, SOLUTION EPIDURAL; INFILTRATION; INTRACAUDAL; PERINEURAL
Status: COMPLETED | OUTPATIENT
Start: 2018-10-10 | End: 2018-10-10

## 2018-10-10 RX ORDER — NITROGLYCERIN 20 MG/100ML
.07-2 INJECTION INTRAVENOUS CONTINUOUS PRN
Status: DISCONTINUED | OUTPATIENT
Start: 2018-10-10 | End: 2018-10-10 | Stop reason: HOSPADM

## 2018-10-10 RX ORDER — DOPAMINE HYDROCHLORIDE 160 MG/100ML
2-20 INJECTION, SOLUTION INTRAVENOUS CONTINUOUS PRN
Status: DISCONTINUED | OUTPATIENT
Start: 2018-10-10 | End: 2018-10-10 | Stop reason: HOSPADM

## 2018-10-10 RX ORDER — NIFEDIPINE 10 MG/1
10 CAPSULE ORAL
Status: DISCONTINUED | OUTPATIENT
Start: 2018-10-10 | End: 2018-10-10 | Stop reason: HOSPADM

## 2018-10-10 RX ORDER — ADENOSINE 3 MG/ML
12-12000 INJECTION, SOLUTION INTRAVENOUS
Status: DISCONTINUED | OUTPATIENT
Start: 2018-10-10 | End: 2018-10-10 | Stop reason: HOSPADM

## 2018-10-10 RX ADMIN — LIDOCAINE HYDROCHLORIDE 10 ML: 10 INJECTION, SOLUTION EPIDURAL; INFILTRATION; INTRACAUDAL; PERINEURAL at 11:32

## 2018-10-10 RX ADMIN — MIDAZOLAM 1 MG: 1 INJECTION INTRAMUSCULAR; INTRAVENOUS at 11:38

## 2018-10-10 RX ADMIN — MIDAZOLAM 1 MG: 1 INJECTION INTRAMUSCULAR; INTRAVENOUS at 11:47

## 2018-10-10 RX ADMIN — FENTANYL CITRATE 50 MCG: 50 INJECTION, SOLUTION INTRAMUSCULAR; INTRAVENOUS at 11:29

## 2018-10-10 RX ADMIN — IOPAMIDOL 40 ML: 755 INJECTION, SOLUTION INTRAVASCULAR at 12:00

## 2018-10-10 RX ADMIN — FENTANYL CITRATE 50 MCG: 50 INJECTION, SOLUTION INTRAMUSCULAR; INTRAVENOUS at 11:33

## 2018-10-10 RX ADMIN — SODIUM CHLORIDE: 9 INJECTION, SOLUTION INTRAVENOUS at 09:58

## 2018-10-10 RX ADMIN — MIDAZOLAM 2 MG: 1 INJECTION INTRAMUSCULAR; INTRAVENOUS at 11:29

## 2018-10-10 NOTE — IP AVS SNAPSHOT
MRN:[id number]                      After Visit Summary   10/10/2018    Glenny Butts    MRN: [id number]           Visit Information        Department      10/10/2018  8:53 AM [facility]          Review of your medicines      UNREVIEWED medicines. Ask your doctor about these medicines        Dose / Directions    ASPIRIN PO        Dose:  81 mg   Take 81 mg by mouth daily   Refills:  0       atorvastatin 40 MG tablet   Commonly known as:  LIPITOR   Used for:  NSTEMI (non-ST elevated myocardial infarction) (H)        Dose:  40 mg   Take 1 tablet (40 mg) by mouth daily   Quantity:  90 tablet   Refills:  3       BENADRYL PO        Dose:  25 mg   Take 25 mg by mouth as needed Reported on 2/17/2017   Refills:  0       doxylamine 25 MG Tabs tablet   Commonly known as:  UNISOM        Dose:  12.5 mg   Take 12.5 mg by mouth nightly as needed   Refills:  0       lisinopril 2.5 MG tablet   Commonly known as:  PRINIVIL/Zestril   Used for:  Coronary artery disease involving native coronary artery of native heart without angina pectoris        Dose:  2.5 mg   Take 1 tablet (2.5 mg) by mouth daily   Quantity:  90 tablet   Refills:  3       nitroGLYcerin 0.4 MG sublingual tablet   Commonly known as:  NITROSTAT   Used for:  Chest pain        Dose:  0.4 mg   Place 1 tablet (0.4 mg) under the tongue every 5 minutes as needed for chest pain If you are still having symptoms after 3 doses (15 minutes) call 911.   Quantity:  25 tablet   Refills:  0       omeprazole 20 MG tablet        Dose:  20 mg   Take 20 mg by mouth as needed   Refills:  0       TYLENOL 325 MG tablet   Generic drug:  acetaminophen        Dose:  325-650 mg   Take 325-650 mg by mouth every 6 hours as needed for mild pain   Refills:  0       Vitamin D (Cholecalciferol) 400 units Tabs        Dose:  800 Units   Take 800 Units by mouth daily   Refills:  0                Protect others around you: Learn how to safely use, store and throw  away your medicines at www.disposemymeds.org.         Follow-ups after your visit        Your next 10 appointments already scheduled     Oct 11, 2018  8:45 AM CDT   Return Visit with Janes Camarillo MD   [facility] ([facility])    [address] [place] [address]   [phone] OPT 2            Oct 19, 2018  3:10 PM CDT   Return Visit with LIZ Romero   [facility] ([facility])    [address] [place], [place] [id number]  [facility] [address]   [phone] OPT 2               Care Instructions        After Care Instructions     Discharge Instructions - Follow up with [facility] Nurse Practitioner        Follow up with [facility] Nurse Practitioner at [facility] Clinic of patient preference in 7-10 days.            Discharge Instructions - IF on Metformin (Glucophage or Glucovance) or Metformin containing medications       IF on Metformin (Glucophage or Glucovance) or Metformin containing medications , schedule a Basic Metabolic Panel at [facility] or Primary Clinic in 48 - 72 hours post procedure and PRIOR TO resuming the Metformin or Metformin containing medications.  Hold Metformin (Glucophage or Glucovance) or Metformin containing medications until after the Basic Metabolic Panel on the 2nd or 3rd day following the procedure.  May resume after blood draw is complete.                  Further instructions from your care team       Cardiac Angiogram Discharge Instructions - Femoral    After you go home:      Have an adult stay with you until tomorrow.    Drink extra fluids for 2 days.    You may resume your normal diet.    No smoking       For 24 hours - due to the sedation you received:    Relax and take it easy.    Do NOT make any important or legal decisions.    Do NOT drive or operate machines at home or at work.    Do NOT drink alcohol.    Care of Groin Puncture Site:      For the first 24 hrs - check the  puncture site every 1-2 hours while awake.    For 2 days, when you cough, sneeze, laugh or move your bowels, hold your hand over the puncture site and press firmly.    Remove the bandaid after 24 hours. If there is minor oozing, apply another bandaid and remove it after 12 hours.    It is normal to have a small bruise or pea size lump at the site.    You may shower tomorrow. Do NOT take a bath, or use a hot tub or pool for at least 3 days. Do NOT scrub the site. Do not use lotion or powder near the puncture site.    Activity:            For 2 days:    No stooping or squatting    Do NOT do any heavy activity such as exercise, lifting, or straining.     No housework, yard work or any activity that make you sweat    Do NOT lift more than 10 pounds    Bleeding:      If you start bleeding from the site in your groin, lie down flat and press firmly on/above the site for 10 minutes.     Once bleeding stops, lay flat for 2 hours.     Call [facility] as soon as you can.       Call 911 right away if you have heavy bleeding or bleeding that does not stop.      Medicines:      If you have stopped any medicines, check with your provider about when to restart them.    Follow Up Appointments:      Follow up with [facility] Nurse Practitioner at [facility] Clinic of patient preference in 7-10 days.    Call the clinic if:      You have increased pain or a large or growing hard lump around the site.    The site is red, swollen, hot or tender.    Blood or fluid is draining from the site.    You have chills or a fever greater than 101 F (38 C).    Your leg feels numb, cool or changes color.    You have hives, a rash or unusual itching.    New pain in the back or belly that you cannot control with Tylenol.    Any questions or concerns.          [facility] [place]:    [phone] [facility] (7 days a week)           Additional Information About Your Visit        [employer] Information     [employer] gives you secure  "access to your electronic health record. If you see a primary care provider, you can also send messages to your care team and make appointments. If you have questions, please call your primary care clinic.  If you do not have a primary care provider, please call [phone] and they will assist you.        Care EveryWhere ID     This is your Care EveryWhere ID. This could be used by other organizations to access your [facility] medical records  [id number]        Your Vitals Were     Blood Pressure Pulse Temperature Respirations Height Weight    106/67 57 97.6  F (36.4  C) (Oral) 16 1.676 m (5' 6\") 55.7 kg (122 lb 11.2 oz)    Last Period Pulse Oximetry BMI (Body Mass Index)             08/20/2014 (Exact Date) 99% 19.8 kg/m2          Primary Care Provider Office Phone # Fax #    Rosalba Daisy Liu [phone]      Equal Access to Services     AMY NI AH: Hadii aad ku hadasho Soomaali, waaxda luqadaha, qaybta kaalmada adeegyada, waxay stuartin haychin davi mcneil . So [facility] [phone].    ATENCIÓN: Si habla espzoltan, tiene a vicente disposición servicios gratuitos de asistencia lingüística. Jenifferame al [phone].    We comply with applicable federal civil rights laws and [place] laws. We do not discriminate on the basis of race, color, national origin, age, disability, sex, sexual orientation, or gender identity.            Thank you!     Thank you for choosing [facility] for your care. Our goal is always to provide you with excellent care. Hearing back from our patients is one way we can continue to improve our services. Please take a few minutes to complete the written survey that you may receive in the mail after you visit with us. Thank you!             Medication List: This is a list of all your medications and when to take them. Check marks below indicate your daily home schedule. Keep this list as a reference.      Medications           Morning Afternoon Evening Bedtime As Needed    " ASPIRIN PO   Take 81 mg by mouth daily                                atorvastatin 40 MG tablet   Commonly known as:  LIPITOR   Take 1 tablet (40 mg) by mouth daily                                BENADRYL PO   Take 25 mg by mouth as needed Reported on 2/17/2017                                doxylamine 25 MG Tabs tablet   Commonly known as:  UNISOM   Take 12.5 mg by mouth nightly as needed                                lisinopril 2.5 MG tablet   Commonly known as:  PRINIVIL/Zestril   Take 1 tablet (2.5 mg) by mouth daily                                nitroGLYcerin 0.4 MG sublingual tablet   Commonly known as:  NITROSTAT   Place 1 tablet (0.4 mg) under the tongue every 5 minutes as needed for chest pain If you are still having symptoms after 3 doses (15 minutes) call 911.                                omeprazole 20 MG tablet   Take 20 mg by mouth as needed                                TYLENOL 325 MG tablet   Take 325-650 mg by mouth every 6 hours as needed for mild pain   Generic drug:  acetaminophen                                Vitamin D (Cholecalciferol) 400 units Tabs   Take 800 Units by mouth daily

## 2018-10-10 NOTE — DISCHARGE SUMMARY
Patient discharged to home with  by wheelchair at 2:22 PM 10/10/18.  Medication regimen discussed with patient and patient verbalizes understanding.  Diet and activity and wound care discussed with patient.  Upcoming appointments reviewed.  No questions at this time.  RLE CMS intact, denies pain, numbness and tingling.  Dressing CDI.  Tele shows SB.

## 2018-10-10 NOTE — IP AVS SNAPSHOT
[place] [address]    [place] [address]    Phone:  [phone]                                       After Visit Summary   10/10/2018    Glenny Butts    MRN: [id number]           After Visit Summary Signature Page     I have received my discharge instructions, and my questions have been answered. I have discussed any challenges I see with this plan with the nurse or doctor.    ..........................................................................................................................................  Patient/Patient Representative Signature      ..........................................................................................................................................  Patient Representative Print Name and Relationship to Patient    ..................................................               ................................................  Date                                   Time    ..........................................................................................................................................  Reviewed by Signature/Title    ...................................................              ..............................................  Date                                               Time          22EPIC Rev 08/18

## 2018-10-10 NOTE — DISCHARGE INSTRUCTIONS
Cardiac Angiogram Discharge Instructions - Femoral    After you go home:      Have an adult stay with you until tomorrow.    Drink extra fluids for 2 days.    You may resume your normal diet.    No smoking       For 24 hours - due to the sedation you received:    Relax and take it easy.    Do NOT make any important or legal decisions.    Do NOT drive or operate machines at home or at work.    Do NOT drink alcohol.    Care of Groin Puncture Site:      For the first 24 hrs - check the puncture site every 1-2 hours while awake.    For 2 days, when you cough, sneeze, laugh or move your bowels, hold your hand over the puncture site and press firmly.    Remove the bandaid after 24 hours. If there is minor oozing, apply another bandaid and remove it after 12 hours.    It is normal to have a small bruise or pea size lump at the site.    You may shower tomorrow. Do NOT take a bath, or use a hot tub or pool for at least 3 days. Do NOT scrub the site. Do not use lotion or powder near the puncture site.    Activity:            For 2 days:    No stooping or squatting    Do NOT do any heavy activity such as exercise, lifting, or straining.     No housework, yard work or any activity that make you sweat    Do NOT lift more than 10 pounds    Bleeding:      If you start bleeding from the site in your groin, lie down flat and press firmly on/above the site for 10 minutes.     Once bleeding stops, lay flat for 2 hours.     Call [facility] as soon as you can.       Call 911 right away if you have heavy bleeding or bleeding that does not stop.      Medicines:      If you have stopped any medicines, check with your provider about when to restart them.    Follow Up Appointments:      Follow up with [facility] Nurse Practitioner at [facility] of patient preference in 7-10 days.    Call the clinic if:      You have increased pain or a large or growing hard lump around the site.    The site is red, swollen, hot or tender.    Blood or  fluid is draining from the site.    You have chills or a fever greater than 101 F (38 C).    Your leg feels numb, cool or changes color.    You have hives, a rash or unusual itching.    New pain in the back or belly that you cannot control with Tylenol.    Any questions or concerns.          [facility] at [place]:    [phone] [facility] (7 days a week)

## 2018-10-10 NOTE — PROGRESS NOTES
Patient declined watched video.    Consent yet to be signed.    IV started and labs drawn 10/8 reviewed.    EKG completed showing: SB.     Pulses charted.  Patient took 81 mg aspirin today.  Patient is not taking Metformin.  Patient is accompanied by Yash - spouse [phone]  Pre procedure plan of care reviewed with patient prior to procedure. All questions answered and patient verbalizes acceptance of plan.

## 2018-10-11 ENCOUNTER — OFFICE VISIT (OUTPATIENT)
Dept: CARDIOLOGY | Facility: CLINIC | Age: 58
End: 2018-10-11
Payer: COMMERCIAL

## 2018-10-11 VITALS
HEART RATE: 64 BPM | HEIGHT: 66 IN | WEIGHT: 124 LBS | BODY MASS INDEX: 19.93 KG/M2 | DIASTOLIC BLOOD PRESSURE: 70 MMHG | SYSTOLIC BLOOD PRESSURE: 126 MMHG

## 2018-10-11 DIAGNOSIS — I25.10 CORONARY ARTERY DISEASE INVOLVING NATIVE CORONARY ARTERY OF NATIVE HEART WITHOUT ANGINA PECTORIS: Primary | ICD-10-CM

## 2018-10-11 DIAGNOSIS — E78.00 PURE HYPERCHOLESTEROLEMIA: ICD-10-CM

## 2018-10-11 PROCEDURE — 99214 OFFICE O/P EST MOD 30 MIN: CPT | Performed by: INTERNAL MEDICINE

## 2018-10-11 NOTE — LETTER
10/11/2018      Rosalba Liu MD  [address] [facility] [address]      RE: Glenny Butts       Dear Colleague,    I had the pleasure of seeing Glenny Butts in the [facility].    Service Date: 10/11/2018      HISTORY OF PRESENT ILLNESS:  Ms. Butts is a pleasant 58-year-old female with a history of coronary artery disease with a prior small non-ST segment elevation MI for which she underwent PCI to the LAD due to a thrombotic occlusion in 2015.  She was recently seen by her primary care provider due to atypical chest discomfort and underwent a repeat nuclear stress test which I reviewed personally.  I felt that there was improper equalization however, a large area of anterior wall ischemia was called.  I subsequently sent the patient for cardiac catheterization, which was completed on 10/10/2016.  Dr. Perkins performed the cardiac catheterization and found a widely patent stent in the LAD without evidence for restenosis.  There was an ostial pinch of a second diagonal in the 80%-90% range which was unchanged from the prior cardiac catheterization.  There is a 50% stenosis further down in the second diagonal artery which was also unchanged.  There was a 30% stenosis in the circumflex and 25% stenosis in the right coronary artery which were unchanged.  Medical management was recommended.      The patient returns in close post-cardiac catheterization followup and states that she has been feeling well.  She has had no atypical chest discomfort that prompted the stress test.  However, she is thinking about seeing [facility] as she is concerned this may be related to her reflux.  She has been having no recurrent anginal symptoms which is left arm discomfort.  The patient's cardiac catheterization site has not been bothering the patient.      She denies any other cardiovascular complaints, denying any other types of chest pain, chest pressure, shortness of  breath, orthopnea or paroxysmal nocturnal dyspnea.      Please see my separate note with her full physical examination.      IMPRESSION AND PLAN:  Ms. Samuel is a pleasant 58-year-old female with a history of coronary artery disease as detailed above with prior PCI to the LAD which resulted in jailing of her second diagonal artery.  Her repeat cardiac catheterization shows no change in comparison with the prior cardiac catheterization.  At this time, I would recommend continued medical management and will continue her aspirin 81 mg a day and atorvastatin 40 mg daily unchanged for secondary prevention.  She will plan to follow back with [facility] to evaluate her epigastric pain and if there is concern that her aspirin is resulting in gastritis, we can replace her aspirin with clopidogrel in the future.  Otherwise, the patient is to adhere to her excellent risk factor modification.  She has done extremely well with a majority plant-based Mediterranean diet and exercise.  She has also managed to keep her weight under control and has a BMI of around 20.  Overall, I feel the patient is doing exceptionally well from a cardiovascular standpoint and I will see her back in routine followup in 6 months.         ISH MARTINS MD             D: 10/11/2018   T: 10/11/2018   MT: EWELINA      Name:     SEBASTIEN SAMUEL   MRN:      [id number]        Account:      [id number]   :      1960           Service Date: 10/11/2018      Document: [id number]           Outpatient Encounter Prescriptions as of 10/11/2018   Medication Sig Dispense Refill     acetaminophen (TYLENOL) 325 MG tablet Take 325-650 mg by mouth every 6 hours as needed for mild pain       ASPIRIN PO Take 81 mg by mouth daily       atorvastatin (LIPITOR) 40 MG tablet Take 1 tablet (40 mg) by mouth daily 90 tablet 3     DiphenhydrAMINE HCl (BENADRYL PO) Take 25 mg by mouth as needed Reported on 2017       doxylamine (UNISOM) 25 MG TABS Take  12.5 mg by mouth nightly as needed        lisinopril (PRINIVIL/ZESTRIL) 2.5 MG tablet Take 1 tablet (2.5 mg) by mouth daily 90 tablet 3     nitroglycerin (NITROSTAT) 0.4 MG SL tablet Place 1 tablet (0.4 mg) under the tongue every 5 minutes as needed for chest pain If you are still having symptoms after 3 doses (15 minutes) call 911. 25 tablet 0     omeprazole 20 MG tablet Take 20 mg by mouth as needed       Vitamin D, Cholecalciferol, 400 units TABS Take 800 Units by mouth daily       No facility-administered encounter medications on file as of 10/11/2018.        Again, thank you for allowing me to participate in the care of your patient.      Sincerely,    Janes Camarillo MD     [facility]

## 2018-10-11 NOTE — PROGRESS NOTES
Service Date: 10/11/2018      HISTORY OF PRESENT ILLNESS:  Ms. Butts is a pleasant 58-year-old female with a history of coronary artery disease with a prior small non-ST segment elevation MI for which she underwent PCI to the LAD due to a thrombotic occlusion in 2015.  She was recently seen by her primary care provider due to atypical chest discomfort and underwent a repeat nuclear stress test which I reviewed personally.  I felt that there was improper equalization however, a large area of anterior wall ischemia was called.  I subsequently sent the patient for cardiac catheterization, which was completed on 10/10/2016.  Dr. Perkins performed the cardiac catheterization and found a widely patent stent in the LAD without evidence for restenosis.  There was an ostial pinch of a second diagonal in the 80%-90% range which was unchanged from the prior cardiac catheterization.  There is a 50% stenosis further down in the second diagonal artery which was also unchanged.  There was a 30% stenosis in the circumflex and 25% stenosis in the right coronary artery which were unchanged.  Medical management was recommended.      The patient returns in close post-cardiac catheterization followup and states that she has been feeling well.  She has had no atypical chest discomfort that prompted the stress test.  However, she is thinking about seeing [facility] as she is concerned this may be related to her reflux.  She has been having no recurrent anginal symptoms which is left arm discomfort.  The patient's cardiac catheterization site has not been bothering the patient.      She denies any other cardiovascular complaints, denying any other types of chest pain, chest pressure, shortness of breath, orthopnea or paroxysmal nocturnal dyspnea.      Please see my separate note with her full physical examination.      IMPRESSION AND PLAN:  Ms. Butts is a pleasant 58-year-old female with a history of coronary artery  disease as detailed above with prior PCI to the LAD which resulted in jailing of her second diagonal artery.  Her repeat cardiac catheterization shows no change in comparison with the prior cardiac catheterization.  At this time, I would recommend continued medical management and will continue her aspirin 81 mg a day and atorvastatin 40 mg daily unchanged for secondary prevention.  She will plan to follow back with [facility] to evaluate her epigastric pain and if there is concern that her aspirin is resulting in gastritis, we can replace her aspirin with clopidogrel in the future.  Otherwise, the patient is to adhere to her excellent risk factor modification.  She has done extremely well with a majority plant-based Mediterranean diet and exercise.  She has also managed to keep her weight under control and has a BMI of around 20.  Overall, I feel the patient is doing exceptionally well from a cardiovascular standpoint and I will see her back in routine followup in 6 months.         ISH MARTINS MD             D: 10/11/2018   T: 10/11/2018   MT: EWELINA      Name:     SEBASTIEN SAMUEL   MRN:      [id number]        Account:      [id number]   :      1960           Service Date: 10/11/2018      Document: [id number]

## 2018-10-11 NOTE — LETTER
10/11/2018    Rosalba Liu MD  [address]  [facility] [address]    RE: Glenny Butts       Dear Colleague,    I had the pleasure of seeing Glenny Butts in the [facility].    HPI and Plan:   See dictation    Orders Placed This Encounter   Procedures     Follow-Up with Cardiologist       No orders of the defined types were placed in this encounter.      There are no discontinued medications.      Encounter Diagnoses   Name Primary?     Coronary artery disease involving native coronary artery of native heart without angina pectoris Yes     Pure hypercholesterolemia        CURRENT MEDICATIONS:  Current Outpatient Prescriptions   Medication Sig Dispense Refill     acetaminophen (TYLENOL) 325 MG tablet Take 325-650 mg by mouth every 6 hours as needed for mild pain       ASPIRIN PO Take 81 mg by mouth daily       atorvastatin (LIPITOR) 40 MG tablet Take 1 tablet (40 mg) by mouth daily 90 tablet 3     DiphenhydrAMINE HCl (BENADRYL PO) Take 25 mg by mouth as needed Reported on 2/17/2017       doxylamine (UNISOM) 25 MG TABS Take 12.5 mg by mouth nightly as needed        lisinopril (PRINIVIL/ZESTRIL) 2.5 MG tablet Take 1 tablet (2.5 mg) by mouth daily 90 tablet 3     nitroglycerin (NITROSTAT) 0.4 MG SL tablet Place 1 tablet (0.4 mg) under the tongue every 5 minutes as needed for chest pain If you are still having symptoms after 3 doses (15 minutes) call 911. 25 tablet 0     omeprazole 20 MG tablet Take 20 mg by mouth as needed       Vitamin D, Cholecalciferol, 400 units TABS Take 800 Units by mouth daily         ALLERGIES     Allergies   Allergen Reactions     Dust Mites      Grass      Trees        PAST MEDICAL HISTORY:  Past Medical History:   Diagnosis Date     CAD (coronary artery disease) 2/15    Stent to LAD 01/2015     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      Hyperlipidemia      Ischemic cardiomyopathy      NSTEMI (non-ST elevated myocardial infarction) (H) 2/15      "S/P coronary angiogram 10-5-2015    patent prev placed stent-aggressive medical management     Stress incontinence      Syncope        PAST SURGICAL HISTORY:  Past Surgical History:   Procedure Laterality Date     C DEXA, BONE DENSITY, AXIAL SKEL       CHOLECYSTECTOMY       COLONOSCOPY       HEART CATH, ANGIOPLASTY  1/27/15     mid LAD-2.75 X 28 mm Promus premier FAM      HYSTERECTOMY VAGINAL N/A 12/19/2014    Procedure: HYSTERECTOMY VAGINAL;  Surgeon: Aarti Ramirez MD;  Location: [facility] OR     wisdom teeth         FAMILY HISTORY:  Family History   Problem Relation Age of Onset     Dementia Mother      Myocardial Infarction Father 34     MI     Hypertension Brother      Hypertension Brother        SOCIAL HISTORY:  Social History     Social History     Marital status:      Spouse name: N/A     Number of children: N/A     Years of education: N/A     Social History Main Topics     Smoking status: Never Smoker     Smokeless tobacco: Never Used     Alcohol use Yes      Comment: 4 drinks per month     Drug use: No     Sexual activity: Not Asked     Other Topics Concern     Caffeine Concern No     cup of tea, no coffee     Sleep Concern Yes     takes OTC med     Stress Concern No     Weight Concern No     Special Diet Yes     LOW FAT, low sugar     Exercise Yes      4 times week, brisk walking treadmill, elyptical     Seat Belt Yes     Parent/Sibling W/ Cabg, Mi Or Angioplasty Before 65f 55m? Yes     Social History Narrative       Review of Systems:  Skin:  Negative       Eyes:         ENT:  Negative      Respiratory:  Negative       Cardiovascular:  Negative      Gastroenterology: Positive for heartburn;reflux    Genitourinary:  not assessed      Musculoskeletal:  Negative      Neurologic:  Negative      Psychiatric:  Negative      Heme/Lymph/Imm:  Positive for allergies;hay fever    Endocrine:  Negative        Physical Exam:  Vitals: /70  Pulse 64  Ht 1.676 m (5' 6\")  Wt 56.2 kg (124 lb)  LMP 08/20/2014 " (Exact Date)  BMI 20.01 kg/m2    Constitutional:  cooperative;in no acute distress        Skin:  warm and dry to the touch          Head:  normocephalic        Eyes:  sclera white        Lymph:No Cervical lymphadenopathy present     ENT:  no pallor or cyanosis        Neck:  JVP normal        Respiratory:  clear to auscultation         Cardiac: regular rhythm;normal S1 and S2                pulses full and equal                                        GI:  abdomen soft        Extremities and Muscular Skeletal:  no edema              Neurological:  no gross motor deficits;affect appropriate        Psych:  affect appropriate, oriented to time, person and place        CC  Rosalba Liu MD  [address] [place] [address]                Thank you for allowing me to participate in the care of your patient.      Sincerely,     Janes Camarillo MD     [facility]    cc:   Rosalba Liu MD  [address] [place] [id number]

## 2018-10-11 NOTE — PROGRESS NOTES
HPI and Plan:   See dictation    Orders Placed This Encounter   Procedures     Follow-Up with Cardiologist       No orders of the defined types were placed in this encounter.      There are no discontinued medications.      Encounter Diagnoses   Name Primary?     Coronary artery disease involving native coronary artery of native heart without angina pectoris Yes     Pure hypercholesterolemia        CURRENT MEDICATIONS:  Current Outpatient Prescriptions   Medication Sig Dispense Refill     acetaminophen (TYLENOL) 325 MG tablet Take 325-650 mg by mouth every 6 hours as needed for mild pain       ASPIRIN PO Take 81 mg by mouth daily       atorvastatin (LIPITOR) 40 MG tablet Take 1 tablet (40 mg) by mouth daily 90 tablet 3     DiphenhydrAMINE HCl (BENADRYL PO) Take 25 mg by mouth as needed Reported on 2/17/2017       doxylamine (UNISOM) 25 MG TABS Take 12.5 mg by mouth nightly as needed        lisinopril (PRINIVIL/ZESTRIL) 2.5 MG tablet Take 1 tablet (2.5 mg) by mouth daily 90 tablet 3     nitroglycerin (NITROSTAT) 0.4 MG SL tablet Place 1 tablet (0.4 mg) under the tongue every 5 minutes as needed for chest pain If you are still having symptoms after 3 doses (15 minutes) call 911. 25 tablet 0     omeprazole 20 MG tablet Take 20 mg by mouth as needed       Vitamin D, Cholecalciferol, 400 units TABS Take 800 Units by mouth daily         ALLERGIES     Allergies   Allergen Reactions     Dust Mites      Grass      Trees        PAST MEDICAL HISTORY:  Past Medical History:   Diagnosis Date     CAD (coronary artery disease) 2/15    Stent to LAD 01/2015     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      Hyperlipidemia      Ischemic cardiomyopathy      NSTEMI (non-ST elevated myocardial infarction) (H) 2/15     S/P coronary angiogram 10-5-2015    patent prev placed stent-aggressive medical management     Stress incontinence      Syncope        PAST SURGICAL HISTORY:  Past Surgical History:   Procedure Laterality Date     C DEXA,  "BONE DENSITY, AXIAL SKEL       CHOLECYSTECTOMY       COLONOSCOPY       HEART CATH, ANGIOPLASTY  1/27/15     mid LAD-2.75 X 28 mm Promus premier FAM      HYSTERECTOMY VAGINAL N/A 12/19/2014    Procedure: HYSTERECTOMY VAGINAL;  Surgeon: Aarti Ramirez MD;  Location: [facility] OR     wisdom teeth         FAMILY HISTORY:  Family History   Problem Relation Age of Onset     Dementia Mother      Myocardial Infarction Father 34     MI     Hypertension Brother      Hypertension Brother        SOCIAL HISTORY:  Social History     Social History     Marital status:      Spouse name: N/A     Number of children: N/A     Years of education: N/A     Social History Main Topics     Smoking status: Never Smoker     Smokeless tobacco: Never Used     Alcohol use Yes      Comment: 4 drinks per month     Drug use: No     Sexual activity: Not Asked     Other Topics Concern     Caffeine Concern No     cup of tea, no coffee     Sleep Concern Yes     takes OTC med     Stress Concern No     Weight Concern No     Special Diet Yes     LOW FAT, low sugar     Exercise Yes      4 times week, brisk walking treadmill, elyptical     Seat Belt Yes     Parent/Sibling W/ Cabg, Mi Or Angioplasty Before 65f 55m? Yes     Social History Narrative       Review of Systems:  Skin:  Negative       Eyes:         ENT:  Negative      Respiratory:  Negative       Cardiovascular:  Negative      Gastroenterology: Positive for heartburn;reflux    Genitourinary:  not assessed      Musculoskeletal:  Negative      Neurologic:  Negative      Psychiatric:  Negative      Heme/Lymph/Imm:  Positive for allergies;hay fever    Endocrine:  Negative        Physical Exam:  Vitals: /70  Pulse 64  Ht 1.676 m (5' 6\")  Wt 56.2 kg (124 lb)  LMP 08/20/2014 (Exact Date)  BMI 20.01 kg/m2    Constitutional:  cooperative;in no acute distress        Skin:  warm and dry to the touch          Head:  normocephalic        Eyes:  sclera white        Lymph:No Cervical lymphadenopathy " present     ENT:  no pallor or cyanosis        Neck:  JVP normal        Respiratory:  clear to auscultation         Cardiac: regular rhythm;normal S1 and S2                pulses full and equal                                        GI:  abdomen soft        Extremities and Muscular Skeletal:  no edema              Neurological:  no gross motor deficits;affect appropriate        Psych:  affect appropriate, oriented to time, person and place          Rosalba Liu MD  [address] [place] [address]

## 2018-10-11 NOTE — MR AVS SNAPSHOT
After Visit Summary   10/11/2018    Glenny Butts    MRN: [id number]           Patient Information     Date Of Birth          1960        Visit Information        Provider Department      10/11/2018 8:45 AM Janes Camarillo MD [facility]        Today's Diagnoses     Coronary artery disease involving native coronary artery of native heart without angina pectoris    -  1    Pure hypercholesterolemia           Follow-ups after your visit        Additional Services     Follow-Up with Cardiologist                 Future tests that were ordered for you today     Open Future Orders        Priority Expected Expires Ordered    Follow-Up with Cardiologist Routine 4/9/2019 10/11/2019 10/11/2018            Who to contact     If you have questions or need follow up information about today's clinic visit or your schedule please contact [facility] directly at [phone].  Normal or non-critical lab and imaging results will be communicated to you by [employer], letter or phone within 4 business days after the clinic has received the results. If you do not hear from us within 7 days, please contact the clinic through [employer] or phone. If you have a critical or abnormal lab result, we will notify you by phone as soon as possible.  Submit refill requests through [employer] or call your pharmacy and they will forward the refill request to us. Please allow 3 business days for your refill to be completed.          Additional Information About Your Visit        MyChart Information     [employer] gives you secure access to your electronic health record. If you see a primary care provider, you can also send messages to your care team and make appointments. If you have questions, please call your primary care clinic.  If you do not have a primary care provider, please call [phone] and they will assist you.        Care EveryWhere ID     This is  "your Care EveryWhere ID. This could be used by other organizations to access your [facility] medical records  [id number]        Your Vitals Were     Pulse Height Last Period BMI (Body Mass Index)          64 1.676 m (5' 6\") 08/20/2014 (Exact Date) 20.01 kg/m2         Blood Pressure from Last 3 Encounters:   10/11/18 126/70   10/10/18 92/56   10/08/18 138/78    Weight from Last 3 Encounters:   10/11/18 56.2 kg (124 lb)   10/10/18 55.7 kg (122 lb 11.2 oz)   10/08/18 56.1 kg (123 lb 11.2 oz)               Primary Care Provider Office Phone # Fax #    Rosalba Daisy Liu [phone] [address] [facility] [place] [facility] [place] [address]        Equal Access to Services     [facility]: Hadii aad ku hadasho Soomaali, waaxda luqadaha, qaybta kaalmada adeegyada, waxay stuartin hayaan davi mcneil . So [facility] [phone].    ATENCIÓN: Si habla español, tiene a vicente disposición servicios gratuitos de asistencia lingüística. Llame al [phone].    We comply with applicable federal civil rights laws and [place] laws. We do not discriminate on the basis of race, color, national origin, age, disability, sex, sexual orientation, or gender identity.            Thank you!     Thank you for choosing [facility]  for your care. Our goal is always to provide you with excellent care. Hearing back from our patients is one way we can continue to improve our services. Please take a few minutes to complete the written survey that you may receive in the mail after your visit with us. Thank you!             Your Updated Medication List - Protect others around you: Learn how to safely use, store and throw away your medicines at www.disposemymeds.org.          This list is accurate as of 10/11/18  9:52 AM.  Always use your most recent med list.                   Brand Name Dispense Instructions for use Diagnosis    ASPIRIN PO      Take 81 mg by mouth daily        atorvastatin 40 MG " tablet    LIPITOR    90 tablet    Take 1 tablet (40 mg) by mouth daily    NSTEMI (non-ST elevated myocardial infarction) (H)       BENADRYL PO      Take 25 mg by mouth as needed Reported on 2/17/2017        doxylamine 25 MG Tabs tablet    UNISOM     Take 12.5 mg by mouth nightly as needed        lisinopril 2.5 MG tablet    PRINIVIL/Zestril    90 tablet    Take 1 tablet (2.5 mg) by mouth daily    Coronary artery disease involving native coronary artery of native heart without angina pectoris       nitroGLYcerin 0.4 MG sublingual tablet    NITROSTAT    25 tablet    Place 1 tablet (0.4 mg) under the tongue every 5 minutes as needed for chest pain If you are still having symptoms after 3 doses (15 minutes) call 911.    Chest pain       omeprazole 20 MG tablet      Take 20 mg by mouth as needed        TYLENOL 325 MG tablet   Generic drug:  acetaminophen      Take 325-650 mg by mouth every 6 hours as needed for mild pain        Vitamin D (Cholecalciferol) 400 units Tabs      Take 800 Units by mouth daily

## 2018-10-12 LAB — INTERPRETATION ECG - MUSE: NORMAL

## 2018-10-18 NOTE — TELEPHONE ENCOUNTER
Phone Call From Leana Hylton  Received a call from patient. [employer] event monitor that patient is currently wearing and that was placed at discharge, is considered out of network by her insurance. Patient is appealing this decision. She needs a letter from a provider stating that this Cardionet event monitor is the only event monitor that is available at [facility]. The event monitor was ordered by a hospitalist. Patient sees Dr Camarillo on 2-22-17 and would appreciate a letter at that time so that she can send to the insurance company for the appeal. Leana Hylton, CV Imaging Manager  OV 2-22-17 with Dr. Camarillo.    ,DirectAddress_Unknown

## 2018-10-22 ENCOUNTER — TRANSFERRED RECORDS (OUTPATIENT)
Dept: FAMILY MEDICINE | Facility: CLINIC | Age: 58
End: 2018-10-22

## 2018-10-31 ENCOUNTER — TRANSFERRED RECORDS (OUTPATIENT)
Dept: HEALTH INFORMATION MANAGEMENT | Facility: CLINIC | Age: 58
End: 2018-10-31

## 2018-10-31 ENCOUNTER — TRANSFERRED RECORDS (OUTPATIENT)
Dept: FAMILY MEDICINE | Facility: CLINIC | Age: 58
End: 2018-10-31

## 2018-11-07 ENCOUNTER — TRANSFERRED RECORDS (OUTPATIENT)
Dept: HEALTH INFORMATION MANAGEMENT | Facility: CLINIC | Age: 58
End: 2018-11-07

## 2018-11-07 ENCOUNTER — TRANSFERRED RECORDS (OUTPATIENT)
Dept: FAMILY MEDICINE | Facility: CLINIC | Age: 58
End: 2018-11-07

## 2018-11-23 DIAGNOSIS — K29.50 CHRONIC GASTRITIS WITHOUT BLEEDING, UNSPECIFIED GASTRITIS TYPE: Primary | ICD-10-CM

## 2018-11-23 RX ORDER — PANTOPRAZOLE SODIUM 40 MG/1
40 TABLET, DELAYED RELEASE ORAL DAILY
Qty: 30 TABLET | Refills: 0 | COMMUNITY
Start: 2018-11-23 | End: 2022-10-21

## 2019-02-19 DIAGNOSIS — I25.10 CORONARY ARTERY DISEASE INVOLVING NATIVE CORONARY ARTERY OF NATIVE HEART WITHOUT ANGINA PECTORIS: ICD-10-CM

## 2019-02-19 RX ORDER — LISINOPRIL 2.5 MG/1
2.5 TABLET ORAL DAILY
Qty: 90 TABLET | Refills: 0 | Status: SHIPPED | OUTPATIENT
Start: 2019-02-19 | End: 2019-05-20

## 2019-05-16 ENCOUNTER — DOCUMENTATION ONLY (OUTPATIENT)
Dept: CARDIOLOGY | Facility: CLINIC | Age: 59
End: 2019-05-16

## 2019-05-20 DIAGNOSIS — I21.4 NSTEMI (NON-ST ELEVATED MYOCARDIAL INFARCTION) (H): ICD-10-CM

## 2019-05-20 DIAGNOSIS — I25.10 CORONARY ARTERY DISEASE INVOLVING NATIVE CORONARY ARTERY OF NATIVE HEART WITHOUT ANGINA PECTORIS: ICD-10-CM

## 2019-05-20 RX ORDER — LISINOPRIL 2.5 MG/1
2.5 TABLET ORAL DAILY
Qty: 90 TABLET | Refills: 0 | Status: SHIPPED | OUTPATIENT
Start: 2019-05-20 | End: 2019-08-19

## 2019-05-20 RX ORDER — ATORVASTATIN CALCIUM 40 MG/1
40 TABLET, FILM COATED ORAL DAILY
Qty: 90 TABLET | Refills: 0 | Status: SHIPPED | OUTPATIENT
Start: 2019-05-20 | End: 2019-08-19

## 2019-08-19 DIAGNOSIS — I25.10 CORONARY ARTERY DISEASE INVOLVING NATIVE CORONARY ARTERY OF NATIVE HEART WITHOUT ANGINA PECTORIS: ICD-10-CM

## 2019-08-19 DIAGNOSIS — I21.4 NSTEMI (NON-ST ELEVATED MYOCARDIAL INFARCTION) (H): ICD-10-CM

## 2019-08-19 RX ORDER — LISINOPRIL 2.5 MG/1
2.5 TABLET ORAL DAILY
Qty: 90 TABLET | Refills: 0 | Status: SHIPPED | OUTPATIENT
Start: 2019-08-19 | End: 2019-09-16

## 2019-08-19 RX ORDER — ATORVASTATIN CALCIUM 40 MG/1
40 TABLET, FILM COATED ORAL DAILY
Qty: 90 TABLET | Refills: 0 | Status: SHIPPED | OUTPATIENT
Start: 2019-08-19 | End: 2019-09-16

## 2019-09-05 DIAGNOSIS — I25.5 ISCHEMIC CARDIOMYOPATHY: ICD-10-CM

## 2019-09-05 DIAGNOSIS — I25.10 CORONARY ARTERY DISEASE INVOLVING NATIVE CORONARY ARTERY OF NATIVE HEART WITHOUT ANGINA PECTORIS: Primary | ICD-10-CM

## 2019-09-05 DIAGNOSIS — E78.00 PURE HYPERCHOLESTEROLEMIA: ICD-10-CM

## 2019-09-13 ENCOUNTER — MYC MEDICAL ADVICE (OUTPATIENT)
Dept: CARDIOLOGY | Facility: CLINIC | Age: 59
End: 2019-09-13

## 2019-09-16 ENCOUNTER — OFFICE VISIT (OUTPATIENT)
Dept: CARDIOLOGY | Facility: CLINIC | Age: 59
End: 2019-09-16
Payer: COMMERCIAL

## 2019-09-16 VITALS
DIASTOLIC BLOOD PRESSURE: 70 MMHG | HEIGHT: 66 IN | HEART RATE: 64 BPM | BODY MASS INDEX: 19.64 KG/M2 | SYSTOLIC BLOOD PRESSURE: 120 MMHG | WEIGHT: 122.2 LBS

## 2019-09-16 DIAGNOSIS — E78.00 PURE HYPERCHOLESTEROLEMIA: ICD-10-CM

## 2019-09-16 DIAGNOSIS — I25.10 CORONARY ARTERY DISEASE INVOLVING NATIVE CORONARY ARTERY OF NATIVE HEART WITHOUT ANGINA PECTORIS: ICD-10-CM

## 2019-09-16 DIAGNOSIS — I25.5 ISCHEMIC CARDIOMYOPATHY: ICD-10-CM

## 2019-09-16 DIAGNOSIS — I21.4 NSTEMI (NON-ST ELEVATED MYOCARDIAL INFARCTION) (H): ICD-10-CM

## 2019-09-16 DIAGNOSIS — I25.10 CORONARY ARTERY DISEASE INVOLVING NATIVE CORONARY ARTERY OF NATIVE HEART WITHOUT ANGINA PECTORIS: Primary | ICD-10-CM

## 2019-09-16 LAB
ALT SERPL W P-5'-P-CCNC: 8 U/L (ref 5–30)
ANION GAP SERPL CALCULATED.3IONS-SCNC: 10.8 MMOL/L (ref 6–17)
BUN SERPL-MCNC: 20 MG/DL (ref 7–30)
CALCIUM SERPL-MCNC: 10.2 MG/DL (ref 8.5–10.5)
CHLORIDE SERPL-SCNC: 99 MMOL/L (ref 98–107)
CHOLEST SERPL-MCNC: 146 MG/DL
CO2 SERPL-SCNC: 28 MMOL/L (ref 23–29)
CREAT SERPL-MCNC: 0.86 MG/DL (ref 0.7–1.3)
GFR SERPL CREATININE-BSD FRML MDRD: 67 ML/MIN/{1.73_M2}
GLUCOSE SERPL-MCNC: 89 MG/DL (ref 70–105)
HDLC SERPL-MCNC: 68 MG/DL
LDLC SERPL CALC-MCNC: 67 MG/DL
NONHDLC SERPL-MCNC: 78 MG/DL
POTASSIUM SERPL-SCNC: 3.8 MMOL/L (ref 3.5–5.1)
SODIUM SERPL-SCNC: 134 MMOL/L (ref 136–145)
TRIGL SERPL-MCNC: 55 MG/DL

## 2019-09-16 PROCEDURE — 84460 ALANINE AMINO (ALT) (SGPT): CPT | Performed by: INTERNAL MEDICINE

## 2019-09-16 PROCEDURE — 80048 BASIC METABOLIC PNL TOTAL CA: CPT | Performed by: INTERNAL MEDICINE

## 2019-09-16 PROCEDURE — 36415 COLL VENOUS BLD VENIPUNCTURE: CPT | Performed by: INTERNAL MEDICINE

## 2019-09-16 PROCEDURE — 80061 LIPID PANEL: CPT | Performed by: INTERNAL MEDICINE

## 2019-09-16 PROCEDURE — 82306 VITAMIN D 25 HYDROXY: CPT | Performed by: INTERNAL MEDICINE

## 2019-09-16 PROCEDURE — 99204 OFFICE O/P NEW MOD 45 MIN: CPT | Performed by: INTERNAL MEDICINE

## 2019-09-16 RX ORDER — NITROGLYCERIN 0.4 MG/1
0.4 TABLET SUBLINGUAL EVERY 5 MIN PRN
Qty: 25 TABLET | Refills: 1 | Status: SHIPPED | OUTPATIENT
Start: 2019-09-16 | End: 2022-09-19

## 2019-09-16 RX ORDER — ATORVASTATIN CALCIUM 40 MG/1
40 TABLET, FILM COATED ORAL DAILY
Qty: 90 TABLET | Refills: 3 | Status: SHIPPED | OUTPATIENT
Start: 2019-09-16 | End: 2020-10-27

## 2019-09-16 RX ORDER — LISINOPRIL 2.5 MG/1
2.5 TABLET ORAL DAILY
Qty: 90 TABLET | Refills: 3 | Status: SHIPPED | OUTPATIENT
Start: 2019-09-16 | End: 2020-10-27

## 2019-09-16 ASSESSMENT — MIFFLIN-ST. JEOR: SCORE: 1136.05

## 2019-09-16 NOTE — PROGRESS NOTES
HPI and Plan:     I had the pleasure of seeing  in follow up at [facility] heart today.  She is a very pleasant 60 YO female who was previously followed by Dr Camarillo, most recently in 10/2018.  She is a history of coronary disease and is status post non-ST elevation myocardial infarction for which she underwent PCI to the LAD in 2015.      Repeat coronary angiography in 2018 for chest discomfort demonstrated a widely patent stent in the LAD without evidence of restenosis.  Ostial narrowing of a second diagonal branch in the 80 to 90% range was also noted but unchanged compared to her prior angiogram.  Mild to moderate nonobstructive disease was noted elsewhere, and medical therapy was recommended.    Today she presents feeling well overall from a cardiovascular standpoint.  She specifically denies any chest discomfort, dyspnea on exertion, palpitations, syncope or presyncope.  She has been taking all her medications as prescribed.  She is a very active individual who works out regularly without any limitations.    Her past medical history, family history, social history and allergies are reviewed in my epic note.    Her physical exam is dictated below.    Lipids on 9/16/2019 demonstrated total cholesterol 146, HDL of 68, LDL of 67 and triglycerides of 55.    IMPRESSION:  1) CAD s/p PCI to LAD in 2015 as described above.    The patient is doing well overall from a cardiovascular standpoint.  There is no evidence of angina congestive heart failure.  Assuming her clinical assessment stable, I will plan to follow-up in approximately 1 year.    She did have some questions regarding potential hormone replacement therapy for hot flashes.  Although estrogen therapy is not generally recommended in the context of known coronary artery disease, I have promised her that I will review the data and contact her with my formal recommendations.    It was a pleasure seeing her in follow-up.        CURRENT MEDICATIONS:  Current  Outpatient Medications   Medication Sig Dispense Refill     acetaminophen (TYLENOL) 325 MG tablet Take 325-650 mg by mouth every 6 hours as needed for mild pain       ASPIRIN PO Take 81 mg by mouth daily       atorvastatin (LIPITOR) 40 MG tablet Take 1 tablet (40 mg) by mouth daily 90 tablet 0     DiphenhydrAMINE HCl (BENADRYL PO) Take 25 mg by mouth as needed Reported on 2/17/2017       doxylamine (UNISOM) 25 MG TABS Take 12.5 mg by mouth nightly as needed        lisinopril (PRINIVIL/ZESTRIL) 2.5 MG tablet Take 1 tablet (2.5 mg) by mouth daily 90 tablet 0     nitroglycerin (NITROSTAT) 0.4 MG SL tablet Place 1 tablet (0.4 mg) under the tongue every 5 minutes as needed for chest pain If you are still having symptoms after 3 doses (15 minutes) call 911. 25 tablet 0     pantoprazole (PROTONIX) 40 MG EC tablet Take 1 tablet (40 mg) by mouth daily Take 30-60 minutes before a meal. 30 tablet 0     Vitamin D, Cholecalciferol, 400 units TABS Take 800 Units by mouth daily         ALLERGIES     Allergies   Allergen Reactions     Dust Mites      Grass      Trees        PAST MEDICAL HISTORY:  Past Medical History:   Diagnosis Date     CAD (coronary artery disease) 2/15    Stent to LAD 01/2015     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      Hyperlipidemia      Ischemic cardiomyopathy      NSTEMI (non-ST elevated myocardial infarction) (H) 2/15     S/P coronary angiogram 10-5-2015    patent prev placed stent-aggressive medical management     Stress incontinence      Syncope        PAST SURGICAL HISTORY:  Past Surgical History:   Procedure Laterality Date     C DEXA, BONE DENSITY, AXIAL SKEL       CHOLECYSTECTOMY       COLONOSCOPY       HEART CATH, ANGIOPLASTY  1/27/15     mid LAD-2.75 X 28 mm Promus premier FAM      HYSTERECTOMY VAGINAL N/A 12/19/2014    Procedure: HYSTERECTOMY VAGINAL;  Surgeon: Aarti Ramirez MD;  Location: [facility] OR     wisdom teeth         FAMILY HISTORY:  Family History   Problem Relation Age of Onset      Dementia Mother      Myocardial Infarction Father 34        MI     Hypertension Brother      Hypertension Brother        SOCIAL HISTORY:  Social History     Socioeconomic History     Marital status:      Spouse name: Not on file     Number of children: Not on file     Years of education: Not on file     Highest education level: Not on file   Occupational History     Not on file   Social Needs     Financial resource strain: Not on file     Food insecurity:     Worry: Not on file     Inability: Not on file     Transportation needs:     Medical: Not on file     Non-medical: Not on file   Tobacco Use     Smoking status: Never Smoker     Smokeless tobacco: Never Used   Substance and Sexual Activity     Alcohol use: Yes     Comment: 4 drinks per month     Drug use: No     Sexual activity: Not on file   Lifestyle     Physical activity:     Days per week: Not on file     Minutes per session: Not on file     Stress: Not on file   Relationships     Social connections:     Talks on phone: Not on file     Gets together: Not on file     Attends Holiness service: Not on file     Active member of club or organization: Not on file     Attends meetings of clubs or organizations: Not on file     Relationship status: Not on file     Intimate partner violence:     Fear of current or ex partner: Not on file     Emotionally abused: Not on file     Physically abused: Not on file     Forced sexual activity: Not on file   Other Topics Concern      Service Not Asked     Blood Transfusions Not Asked     Caffeine Concern No     Comment: cup of tea, no coffee     Occupational Exposure Not Asked     Hobby Hazards Not Asked     Sleep Concern Yes     Comment: takes OTC med     Stress Concern No     Weight Concern No     Special Diet Yes     Comment: LOW FAT, low sugar     Back Care Not Asked     Exercise Yes     Comment:  4 times week, brisk walking treadmill, elyptical     Bike Helmet Not Asked     Seat Belt Yes     Self-Exams Not  Asked     Parent/sibling w/ CABG, MI or angioplasty before 65F 55M? Yes   Social History Narrative     Not on file       Review of Systems:  Skin:          Eyes:         ENT:         Respiratory:          Cardiovascular:         Gastroenterology:        Genitourinary:         Musculoskeletal:         Neurologic:         Psychiatric:         Heme/Lymph/Imm:         Endocrine:           Physical Exam:  Vitals: LMP 08/20/2014 (Exact Date)     Constitutional:  cooperative;in no acute distress        Skin:  warm and dry to the touch          Head:  normocephalic        Eyes:  sclera white        Lymph:No Cervical lymphadenopathy present     ENT:  no pallor or cyanosis        Neck:  JVP normal        Respiratory:  clear to auscultation         Cardiac: regular rhythm;normal S1 and S2                pulses full and equal                                   Right groin site has dime size bump and diffuse ecchymosis, no bruit    GI:  abdomen soft        Extremities and Muscular Skeletal:  no edema              Neurological:  no gross motor deficits;affect appropriate        Psych:  affect appropriate, oriented to time, person and place        CC  Rosalba Liu MD  [address]  [place] [address]

## 2019-09-16 NOTE — LETTER
9/16/2019    Rosalba Liu MD  [address] [facility] [address]    RE: Glenny Wadsworthersen       Dear Colleague,    I had the pleasure of seeing Glenny Butts in the [facility].    HPI and Plan:     I had the pleasure of seeing  in follow up at [facility] heart today.  She is a very pleasant 60 YO female who was previously followed by Dr Camarillo, most recently in 10/2018.  She is a history of coronary disease and is status post non-ST elevation myocardial infarction for which she underwent PCI to the LAD in 2015.      Repeat coronary angiography in 2018 for chest discomfort demonstrated a widely patent stent in the LAD without evidence of restenosis.  Ostial narrowing of a second diagonal branch in the 80 to 90% range was also noted but unchanged compared to her prior angiogram.  Mild to moderate nonobstructive disease was noted elsewhere, and medical therapy was recommended.    Today she presents feeling well overall from a cardiovascular standpoint.  She specifically denies any chest discomfort, dyspnea on exertion, palpitations, syncope or presyncope.  She has been taking all her medications as prescribed.  She is a very active individual who works out regularly without any limitations.    Her past medical history, family history, social history and allergies are reviewed in my epic note.    Her physical exam is dictated below.    Lipids on 9/16/2019 demonstrated total cholesterol 146, HDL of 68, LDL of 67 and triglycerides of 55.    IMPRESSION:  1) CAD s/p PCI to LAD in 2015 as described above.    The patient is doing well overall from a cardiovascular standpoint.  There is no evidence of angina congestive heart failure.  Assuming her clinical assessment stable, I will plan to follow-up in approximately 1 year.    She did have some questions regarding potential hormone replacement therapy for hot flashes.  Although estrogen therapy is not generally recommended in the  context of known coronary artery disease, I have promised her that I will review the data and contact her with my formal recommendations.    It was a pleasure seeing her in follow-up.        CURRENT MEDICATIONS:  Current Outpatient Medications   Medication Sig Dispense Refill     acetaminophen (TYLENOL) 325 MG tablet Take 325-650 mg by mouth every 6 hours as needed for mild pain       ASPIRIN PO Take 81 mg by mouth daily       atorvastatin (LIPITOR) 40 MG tablet Take 1 tablet (40 mg) by mouth daily 90 tablet 0     DiphenhydrAMINE HCl (BENADRYL PO) Take 25 mg by mouth as needed Reported on 2/17/2017       doxylamine (UNISOM) 25 MG TABS Take 12.5 mg by mouth nightly as needed        lisinopril (PRINIVIL/ZESTRIL) 2.5 MG tablet Take 1 tablet (2.5 mg) by mouth daily 90 tablet 0     nitroglycerin (NITROSTAT) 0.4 MG SL tablet Place 1 tablet (0.4 mg) under the tongue every 5 minutes as needed for chest pain If you are still having symptoms after 3 doses (15 minutes) call 911. 25 tablet 0     pantoprazole (PROTONIX) 40 MG EC tablet Take 1 tablet (40 mg) by mouth daily Take 30-60 minutes before a meal. 30 tablet 0     Vitamin D, Cholecalciferol, 400 units TABS Take 800 Units by mouth daily         ALLERGIES     Allergies   Allergen Reactions     Dust Mites      Grass      Trees        PAST MEDICAL HISTORY:  Past Medical History:   Diagnosis Date     CAD (coronary artery disease) 2/15    Stent to LAD 01/2015     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      Hyperlipidemia      Ischemic cardiomyopathy      NSTEMI (non-ST elevated myocardial infarction) (H) 2/15     S/P coronary angiogram 10-5-2015    patent prev placed stent-aggressive medical management     Stress incontinence      Syncope        PAST SURGICAL HISTORY:  Past Surgical History:   Procedure Laterality Date     C DEXA, BONE DENSITY, AXIAL SKEL       CHOLECYSTECTOMY       COLONOSCOPY       HEART CATH, ANGIOPLASTY  1/27/15     mid LAD-2.75 X 28 mm Promus premier FAM       HYSTERECTOMY VAGINAL N/A 12/19/2014    Procedure: HYSTERECTOMY VAGINAL;  Surgeon: Aarti Ramirez MD;  Location: [facility] OR     wisdom teeth         FAMILY HISTORY:  Family History   Problem Relation Age of Onset     Dementia Mother      Myocardial Infarction Father 34        MI     Hypertension Brother      Hypertension Brother        SOCIAL HISTORY:  Social History     Socioeconomic History     Marital status:      Spouse name: Not on file     Number of children: Not on file     Years of education: Not on file     Highest education level: Not on file   Occupational History     Not on file   Social Needs     Financial resource strain: Not on file     Food insecurity:     Worry: Not on file     Inability: Not on file     Transportation needs:     Medical: Not on file     Non-medical: Not on file   Tobacco Use     Smoking status: Never Smoker     Smokeless tobacco: Never Used   Substance and Sexual Activity     Alcohol use: Yes     Comment: 4 drinks per month     Drug use: No     Sexual activity: Not on file   Lifestyle     Physical activity:     Days per week: Not on file     Minutes per session: Not on file     Stress: Not on file   Relationships     Social connections:     Talks on phone: Not on file     Gets together: Not on file     Attends Rastafari service: Not on file     Active member of club or organization: Not on file     Attends meetings of clubs or organizations: Not on file     Relationship status: Not on file     Intimate partner violence:     Fear of current or ex partner: Not on file     Emotionally abused: Not on file     Physically abused: Not on file     Forced sexual activity: Not on file   Other Topics Concern      Service Not Asked     Blood Transfusions Not Asked     Caffeine Concern No     Comment: cup of tea, no coffee     Occupational Exposure Not Asked     Hobby Hazards Not Asked     Sleep Concern Yes     Comment: takes OTC med     Stress Concern No     Weight Concern No      Special Diet Yes     Comment: LOW FAT, low sugar     Back Care Not Asked     Exercise Yes     Comment:  4 times week, brisk walking treadmill, elyptical     Bike Helmet Not Asked     Seat Belt Yes     Self-Exams Not Asked     Parent/sibling w/ CABG, MI or angioplasty before 65F 55M? Yes   Social History Narrative     Not on file       Review of Systems:  Skin:          Eyes:         ENT:         Respiratory:          Cardiovascular:         Gastroenterology:        Genitourinary:         Musculoskeletal:         Neurologic:         Psychiatric:         Heme/Lymph/Imm:         Endocrine:           Physical Exam:  Vitals: LMP 08/20/2014 (Exact Date)     Constitutional:  cooperative;in no acute distress        Skin:  warm and dry to the touch          Head:  normocephalic        Eyes:  sclera white        Lymph:No Cervical lymphadenopathy present     ENT:  no pallor or cyanosis        Neck:  JVP normal        Respiratory:  clear to auscultation         Cardiac: regular rhythm;normal S1 and S2                pulses full and equal                                   Right groin site has dime size bump and diffuse ecchymosis, no bruit    GI:  abdomen soft        Extremities and Muscular Skeletal:  no edema              Neurological:  no gross motor deficits;affect appropriate        Psych:  affect appropriate, oriented to time, person and place          Thank you for allowing me to participate in the care of your patient.    Sincerely,     Radha Meier MD     [facility]

## 2019-09-18 LAB — DEPRECATED CALCIDIOL+CALCIFEROL SERPL-MC: 52 UG/L (ref 20–75)

## 2019-09-19 ENCOUNTER — MYC MEDICAL ADVICE (OUTPATIENT)
Dept: CARDIOLOGY | Facility: CLINIC | Age: 59
End: 2019-09-19

## 2019-09-19 ENCOUNTER — TELEPHONE (OUTPATIENT)
Dept: CARDIOLOGY | Facility: CLINIC | Age: 59
End: 2019-09-19

## 2019-09-19 NOTE — TELEPHONE ENCOUNTER
Result normal.  Drawn post OV 9-16-19  Vit D.   Patient also had BMP and FLP's.           Vit D level 52,  Range 20-75.     Result letter mailed. Rreleased in My Chart.     Dr. Meier to review.

## 2019-09-19 NOTE — TELEPHONE ENCOUNTER
Called patient with lab results and normal VitD per MyChart request, left voicemail with stable results. Result letter mailed by Team 2 RNs.

## 2019-09-19 NOTE — LETTER
September 19, 2019       TO: Glenny LAURENT Beckie   [id number]  [facility] [id number]       Dear Ms. Butts,          The results of your recent labs look good. Vit D. Level normal.     Results for orders placed or performed in visit on 09/16/19   ALT   Result Value Ref Range    ALT 8 5 - 30 U/L   Lipid Profile   Result Value Ref Range    Cholesterol 146 <200 mg/dL    Triglycerides 55 <150 mg/dL    HDL Cholesterol 68 >49 mg/dL    LDL Cholesterol Calculated 67 <100 mg/dL    Non HDL Cholesterol 78 <130 mg/dL   Basic metabolic panel   Result Value Ref Range    Sodium 134 (L) 136 - 145 mmol/L    Potassium 3.8 3.5 - 5.1 mmol/L    Chloride 99 98 - 107 mmol/L    Carbon Dioxide 28 23 - 29 mmol/L    Anion Gap 10.8 6 - 17 mmol/L    Glucose 89 70 - 105 mg/dL    Urea Nitrogen 20 7 - 30 mg/dL    Creatinine 0.86 0.70 - 1.30 mg/dL    GFR Estimate 67 >60 mL/min/[1.73_m2]    GFR Estimate If Black 81 >60 mL/min/[1.73_m2]    Calcium 10.2 8.5 - 10.5 mg/dL   Vitamin D Deficiency Screening   Result Value Ref Range    Vitamin D Deficiency screening 52 20 - 75 ug/L       Sincerely, [facility]

## 2019-09-23 ENCOUNTER — TELEPHONE (OUTPATIENT)
Dept: CARDIOLOGY | Facility: CLINIC | Age: 59
End: 2019-09-23

## 2019-09-23 NOTE — TELEPHONE ENCOUNTER
Spoke with patient to review Dr. Meier's recommendation to try transdermal estrogen. Patient will call her provider for gynacological issues and discuss.

## 2019-09-23 NOTE — TELEPHONE ENCOUNTER
----- Message from Radha Meier MD sent at 9/20/2019  4:47 PM CDT -----  This patient wanted some recommendations regarding hormone therapy for funmilayo-menopausal symptoms. I would recommend she look at the option of transdermal estrogen, which has been demonstrated to be safer from a CVD and stroke perspective. Thanks.

## 2019-11-07 ENCOUNTER — HEALTH MAINTENANCE LETTER (OUTPATIENT)
Age: 59
End: 2019-11-07

## 2020-07-22 ENCOUNTER — TRANSFERRED RECORDS (OUTPATIENT)
Dept: HEALTH INFORMATION MANAGEMENT | Facility: CLINIC | Age: 60
End: 2020-07-22

## 2020-07-22 LAB
ANION GAP SERPL CALCULATED.3IONS-SCNC: ABNORMAL MMOL/L
AST SERPL-CCNC: 30 U/L
BUN SERPL-MCNC: ABNORMAL MG/DL
CALCIUM SERPL-MCNC: ABNORMAL MG/DL
CHLORIDE SERPLBLD-SCNC: ABNORMAL MMOL/L
CHOLEST SERPL-MCNC: 151 MG/DL
CO2 SERPL-SCNC: ABNORMAL MMOL/L
CREAT SERPL-MCNC: 0.74 MG/DL
GFR SERPL CREATININE-BSD FRML MDRD: 88 ML/MIN/1.73M2
GLUCOSE SERPL-MCNC: 65 MG/DL (ref 65–99)
HDLC SERPL-MCNC: 79 MG/DL
LDLC SERPL CALC-MCNC: 63 MG/DL
NONHDLC SERPL-MCNC: NORMAL MG/DL
POTASSIUM SERPL-SCNC: ABNORMAL MMOL/L
SODIUM SERPL-SCNC: ABNORMAL MMOL/L
TRIGL SERPL-MCNC: 46 MG/DL

## 2020-09-08 ENCOUNTER — TELEPHONE (OUTPATIENT)
Dept: CARDIOLOGY | Facility: CLINIC | Age: 60
End: 2020-09-08

## 2020-09-08 DIAGNOSIS — I25.10 CORONARY ARTERY DISEASE INVOLVING NATIVE CORONARY ARTERY OF NATIVE HEART WITHOUT ANGINA PECTORIS: ICD-10-CM

## 2020-09-08 DIAGNOSIS — E78.00 PURE HYPERCHOLESTEROLEMIA: Primary | ICD-10-CM

## 2020-09-15 ENCOUNTER — TELEPHONE (OUTPATIENT)
Dept: CARDIOLOGY | Facility: CLINIC | Age: 60
End: 2020-09-15

## 2020-09-15 NOTE — TELEPHONE ENCOUNTER
Wellness Screening Tool    Symptom Screening:    Do you have one of the following NEW symptoms:      Fever (subjective or >100.0)?  No    New cough? No    Shortness of breath? No    Chills? No    New loss of taste or smell? No    Generalized body aches? No    New persistent headache? No    New sore throat? No    Nausea, vomiting or diarrhea? No    Within the past 2 weeks, have you been exposed to someone with a known positive illness below?      COVID - 19 (known or suspected) No    Chicken pox?  No    Measles? No    Pertussis? No    Have you had a positive COVID-19 diagnostic test (nasal swab test) in the last 14 days or are you currently   on self-quarantine restrictions (i.e.travel restriction, exposure, etc?) No        Patient notified of visitor restriction: Yes  Patient informed to wear a mask: Yes    Patient's appointment status: Patient will be seen in clinic as scheduled on 9/16/20

## 2020-09-15 NOTE — TELEPHONE ENCOUNTER
Spoke with patient, she had lipids done this summer with [facility] but would like to keep the lab visit on 9/16/2020 to check annual bmp.  Request faxed to [facility] for lipids. Patient will bring her copy as backup.  Patient to be called if EDEN needed as she will be in the building tomorrow for labs.    1430 received lipid panel from [facility], labs entered and sent to scan  Updated patient that lipid panel is in her chart.

## 2020-09-15 NOTE — TELEPHONE ENCOUNTER
Left message for patient requesting that labs be scheduled either at PCP clinic or our office prior to f/up 9/18/20.

## 2020-09-16 DIAGNOSIS — E78.00 PURE HYPERCHOLESTEROLEMIA: ICD-10-CM

## 2020-09-16 DIAGNOSIS — I25.10 CORONARY ARTERY DISEASE INVOLVING NATIVE CORONARY ARTERY OF NATIVE HEART WITHOUT ANGINA PECTORIS: ICD-10-CM

## 2020-09-16 LAB
ANION GAP SERPL CALCULATED.3IONS-SCNC: 9.4 MMOL/L (ref 6–17)
BUN SERPL-MCNC: 21 MG/DL (ref 7–30)
CALCIUM SERPL-MCNC: 9.6 MG/DL (ref 8.5–10.5)
CHLORIDE SERPL-SCNC: 102 MMOL/L (ref 98–107)
CO2 SERPL-SCNC: 29 MMOL/L (ref 23–29)
CREAT SERPL-MCNC: 0.79 MG/DL (ref 0.7–1.3)
GFR SERPL CREATININE-BSD FRML MDRD: 74 ML/MIN/{1.73_M2}
GLUCOSE SERPL-MCNC: 106 MG/DL (ref 70–105)
POTASSIUM SERPL-SCNC: 4.4 MMOL/L (ref 3.5–5.1)
SODIUM SERPL-SCNC: 136 MMOL/L (ref 136–145)

## 2020-09-16 PROCEDURE — 36415 COLL VENOUS BLD VENIPUNCTURE: CPT | Performed by: INTERNAL MEDICINE

## 2020-09-16 PROCEDURE — 80048 BASIC METABOLIC PNL TOTAL CA: CPT | Performed by: INTERNAL MEDICINE

## 2020-09-18 ENCOUNTER — OFFICE VISIT (OUTPATIENT)
Dept: CARDIOLOGY | Facility: CLINIC | Age: 60
End: 2020-09-18
Payer: COMMERCIAL

## 2020-09-18 VITALS
HEIGHT: 66 IN | WEIGHT: 122 LBS | HEART RATE: 53 BPM | BODY MASS INDEX: 19.61 KG/M2 | SYSTOLIC BLOOD PRESSURE: 122 MMHG | DIASTOLIC BLOOD PRESSURE: 62 MMHG

## 2020-09-18 DIAGNOSIS — I25.10 CORONARY ARTERY DISEASE INVOLVING NATIVE CORONARY ARTERY OF NATIVE HEART WITHOUT ANGINA PECTORIS: Primary | ICD-10-CM

## 2020-09-18 PROCEDURE — 99213 OFFICE O/P EST LOW 20 MIN: CPT | Performed by: INTERNAL MEDICINE

## 2020-09-18 RX ORDER — IBUPROFEN 200 MG
1 CAPSULE ORAL 2 TIMES DAILY
COMMUNITY
End: 2022-05-23

## 2020-09-18 ASSESSMENT — MIFFLIN-ST. JEOR: SCORE: 1130.14

## 2020-09-18 NOTE — PROGRESS NOTES
HPI and Plan:   See dictation    No orders of the defined types were placed in this encounter.      Orders Placed This Encounter   Medications     calcium citrate (CITRACAL) 950 MG tablet     Sig: Take 1 tablet by mouth 2 times daily       There are no discontinued medications.      No diagnosis found.    CURRENT MEDICATIONS:  Current Outpatient Medications   Medication Sig Dispense Refill     acetaminophen (TYLENOL) 325 MG tablet Take 325-650 mg by mouth every 6 hours as needed for mild pain       ASPIRIN PO Take 81 mg by mouth daily       atorvastatin (LIPITOR) 40 MG tablet Take 1 tablet (40 mg) by mouth daily 90 tablet 3     calcium citrate (CITRACAL) 950 MG tablet Take 1 tablet by mouth 2 times daily       DiphenhydrAMINE HCl (BENADRYL PO) Take 25 mg by mouth as needed Reported on 2/17/2017       doxylamine (UNISOM) 25 MG TABS Take 12.5 mg by mouth nightly as needed        lisinopril (PRINIVIL/ZESTRIL) 2.5 MG tablet Take 1 tablet (2.5 mg) by mouth daily 90 tablet 3     nitroGLYcerin (NITROSTAT) 0.4 MG sublingual tablet Place 1 tablet (0.4 mg) under the tongue every 5 minutes as needed for chest pain 25 tablet 1     pantoprazole (PROTONIX) 40 MG EC tablet Take 1 tablet (40 mg) by mouth daily Take 30-60 minutes before a meal. 30 tablet 0     Vitamin D, Cholecalciferol, 400 units TABS Take 800 Units by mouth daily         ALLERGIES     Allergies   Allergen Reactions     Dust Mites      Grass      Trees        PAST MEDICAL HISTORY:  Past Medical History:   Diagnosis Date     CAD (coronary artery disease) 2/15    Stent to LAD 01/2015     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      Hyperlipidemia      Ischemic cardiomyopathy      NSTEMI (non-ST elevated myocardial infarction) (H) 2/15     S/P coronary angiogram 10-5-2015    patent prev placed stent-aggressive medical management     Stress incontinence      Syncope        PAST SURGICAL HISTORY:  Past Surgical History:   Procedure Laterality Date     C DEXA, BONE  DENSITY, AXIAL SKEL       CHOLECYSTECTOMY       COLONOSCOPY       HEART CATH, ANGIOPLASTY  1/27/15     mid LAD-2.75 X 28 mm Promus premier FAM      HYSTERECTOMY VAGINAL N/A 12/19/2014    Procedure: HYSTERECTOMY VAGINAL;  Surgeon: Aarti Ramirez MD;  Location:  OR     wisdom teeth         FAMILY HISTORY:  Family History   Problem Relation Age of Onset     Dementia Mother      Myocardial Infarction Father 34        MI     Hypertension Brother      Hypertension Brother        SOCIAL HISTORY:  Social History     Socioeconomic History     Marital status:      Spouse name: None     Number of children: None     Years of education: None     Highest education level: None   Occupational History     None   Social Needs     Financial resource strain: None     Food insecurity     Worry: None     Inability: None     Transportation needs     Medical: None     Non-medical: None   Tobacco Use     Smoking status: Never Smoker     Smokeless tobacco: Never Used   Substance and Sexual Activity     Alcohol use: Yes     Comment: 4 drinks per month     Drug use: No     Sexual activity: None   Lifestyle     Physical activity     Days per week: None     Minutes per session: None     Stress: None   Relationships     Social connections     Talks on phone: None     Gets together: None     Attends Catholic service: None     Active member of club or organization: None     Attends meetings of clubs or organizations: None     Relationship status: None     Intimate partner violence     Fear of current or ex partner: None     Emotionally abused: None     Physically abused: None     Forced sexual activity: None   Other Topics Concern      Service Not Asked     Blood Transfusions Not Asked     Caffeine Concern No     Comment: cup of tea, no coffee     Occupational Exposure Not Asked     Hobby Hazards Not Asked     Sleep Concern Yes     Comment: takes OTC med     Stress Concern No     Weight Concern No     Special Diet Yes      "Comment: LOW FAT, low sugar     Back Care Not Asked     Exercise Yes     Comment:  4 times week, brisk walking treadmill, elyptical     Bike Helmet Not Asked     Seat Belt Yes     Self-Exams Not Asked     Parent/sibling w/ CABG, MI or angioplasty before 65F 55M? Yes   Social History Narrative     None       Review of Systems:  Skin:  Negative       Eyes:  Positive for contacts    ENT:  Positive for nasal congestion allergies  Respiratory:  Negative       Cardiovascular:  Negative      Gastroenterology: Positive for   hiatal hernia  Genitourinary:  Negative      Musculoskeletal:  Negative      Neurologic:  Negative      Psychiatric:  Negative      Heme/Lymph/Imm:  Positive for allergies seasonal  Endocrine:  Positive for night sweats;hot flashes      Physical Exam:  Vitals: /62   Pulse 53   Ht 1.676 m (5' 6\")   Wt 55.3 kg (122 lb)   LMP 08/20/2014 (Exact Date)   BMI 19.69 kg/m      Constitutional:  cooperative        Skin:  warm and dry to the touch          Head:  normocephalic, no masses or lesions        Eyes:  pupils equal and round        Lymph:      ENT:  no pallor or cyanosis, dentition good        Neck:  carotid pulses are full and equal bilaterally        Respiratory:  clear to auscultation         Cardiac: regular rhythm                pulses full and equal, no bruits auscultated                                        GI:  abdomen soft        Extremities and Muscular Skeletal:  no deformities, clubbing, cyanosis, erythema observed              Neurological:  no gross motor deficits        Psych:  Alert and Oriented x 3        CC  Rosalba Liu MD  [address]  [place] [address]              "

## 2020-09-18 NOTE — Clinical Note
9/18/2020    [facility]  [address]  [facility] [address]    RE: Glenny Butts       Dear Colleague,    I had the pleasure of seeing Glenny Butts in the [facility].    HPI and Plan:   See dictation    No orders of the defined types were placed in this encounter.      Orders Placed This Encounter   Medications     calcium citrate (CITRACAL) 950 MG tablet     Sig: Take 1 tablet by mouth 2 times daily       There are no discontinued medications.      No diagnosis found.    CURRENT MEDICATIONS:  Current Outpatient Medications   Medication Sig Dispense Refill     acetaminophen (TYLENOL) 325 MG tablet Take 325-650 mg by mouth every 6 hours as needed for mild pain       ASPIRIN PO Take 81 mg by mouth daily       atorvastatin (LIPITOR) 40 MG tablet Take 1 tablet (40 mg) by mouth daily 90 tablet 3     calcium citrate (CITRACAL) 950 MG tablet Take 1 tablet by mouth 2 times daily       DiphenhydrAMINE HCl (BENADRYL PO) Take 25 mg by mouth as needed Reported on 2/17/2017       doxylamine (UNISOM) 25 MG TABS Take 12.5 mg by mouth nightly as needed        lisinopril (PRINIVIL/ZESTRIL) 2.5 MG tablet Take 1 tablet (2.5 mg) by mouth daily 90 tablet 3     nitroGLYcerin (NITROSTAT) 0.4 MG sublingual tablet Place 1 tablet (0.4 mg) under the tongue every 5 minutes as needed for chest pain 25 tablet 1     pantoprazole (PROTONIX) 40 MG EC tablet Take 1 tablet (40 mg) by mouth daily Take 30-60 minutes before a meal. 30 tablet 0     Vitamin D, Cholecalciferol, 400 units TABS Take 800 Units by mouth daily         ALLERGIES     Allergies   Allergen Reactions     Dust Mites      Grass      Trees        PAST MEDICAL HISTORY:  Past Medical History:   Diagnosis Date     CAD (coronary artery disease) 2/15    Stent to LAD 01/2015     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      Hyperlipidemia      Ischemic cardiomyopathy      NSTEMI (non-ST elevated myocardial infarction) (H) 2/15     S/P  coronary angiogram 10-5-2015    patent prev placed stent-aggressive medical management     Stress incontinence      Syncope        PAST SURGICAL HISTORY:  Past Surgical History:   Procedure Laterality Date     C DEXA, BONE DENSITY, AXIAL SKEL       CHOLECYSTECTOMY       COLONOSCOPY       HEART CATH, ANGIOPLASTY  1/27/15     mid LAD-2.75 X 28 mm Promus premier FAM      HYSTERECTOMY VAGINAL N/A 12/19/2014    Procedure: HYSTERECTOMY VAGINAL;  Surgeon: Aarti Ramriez MD;  Location: [facility] OR     wisdom teeth         FAMILY HISTORY:  Family History   Problem Relation Age of Onset     Dementia Mother      Myocardial Infarction Father 34        MI     Hypertension Brother      Hypertension Brother        SOCIAL HISTORY:  Social History     Socioeconomic History     Marital status:      Spouse name: None     Number of children: None     Years of education: None     Highest education level: None   Occupational History     None   Social Needs     Financial resource strain: None     Food insecurity     Worry: None     Inability: None     Transportation needs     Medical: None     Non-medical: None   Tobacco Use     Smoking status: Never Smoker     Smokeless tobacco: Never Used   Substance and Sexual Activity     Alcohol use: Yes     Comment: 4 drinks per month     Drug use: No     Sexual activity: None   Lifestyle     Physical activity     Days per week: None     Minutes per session: None     Stress: None   Relationships     Social connections     Talks on phone: None     Gets together: None     Attends Baptism service: None     Active member of club or organization: None     Attends meetings of clubs or organizations: None     Relationship status: None     Intimate partner violence     Fear of current or ex partner: None     Emotionally abused: None     Physically abused: None     Forced sexual activity: None   Other Topics Concern      Service Not Asked     Blood Transfusions Not Asked     Caffeine Concern  "No     Comment: cup of tea, no coffee     Occupational Exposure Not Asked     Hobby Hazards Not Asked     Sleep Concern Yes     Comment: takes OTC med     Stress Concern No     Weight Concern No     Special Diet Yes     Comment: LOW FAT, low sugar     Back Care Not Asked     Exercise Yes     Comment:  4 times week, brisk walking treadmill, elyptical     Bike Helmet Not Asked     Seat Belt Yes     Self-Exams Not Asked     Parent/sibling w/ CABG, MI or angioplasty before 65F 55M? Yes   Social History Narrative     None       Review of Systems:  Skin:  Negative       Eyes:  Positive for contacts    ENT:  Positive for nasal congestion allergies  Respiratory:  Negative       Cardiovascular:  Negative      Gastroenterology: Positive for   hiatal hernia  Genitourinary:  Negative      Musculoskeletal:  Negative      Neurologic:  Negative      Psychiatric:  Negative      Heme/Lymph/Imm:  Positive for allergies seasonal  Endocrine:  Positive for night sweats;hot flashes      Physical Exam:  Vitals: /62   Pulse 53   Ht 1.676 m (5' 6\")   Wt 55.3 kg (122 lb)   LMP 08/20/2014 (Exact Date)   BMI 19.69 kg/m      Constitutional:  cooperative        Skin:  warm and dry to the touch          Head:  normocephalic, no masses or lesions        Eyes:  pupils equal and round        Lymph:      ENT:  no pallor or cyanosis, dentition good        Neck:  carotid pulses are full and equal bilaterally        Respiratory:  clear to auscultation         Cardiac: regular rhythm                pulses full and equal, no bruits auscultated                                        GI:  abdomen soft        Extremities and Muscular Skeletal:  no deformities, clubbing, cyanosis, erythema observed              Neurological:  no gross motor deficits        Psych:  Alert and Oriented x 3        CC  Rosalba Liu MD  [address] [place] [address]                Service Date: 09/18/2020      CARDIOLOGY FOLLOWUP       HISTORY OF " PRESENT ILLNESS:  I had the pleasure of seeing Ms. Butts in followup at [facility] today.  She is a very pleasant 60-year-old female who I last saw in 09/2019.  She has a history of coronary artery disease and is status post non-ST elevation myocardial infarction for which she underwent PCI to the LAD in 2015.  She underwent repeat coronary angiography in 2019 for chest discomfort and was found to have a widely patent stent in the LAD without evidence of restenosis.  Ostial narrowing of the second diagonal branch in the 80%-90% range was noted but unchanged compared to her prior angiogram.  Mild-to-moderate nonobstructive disease was noted elsewhere and medical therapy was recommended.      Today, she feels well overall from a cardiac standpoint.  She specifically denies any chest pain, dyspnea on exertion, PND, orthopnea or lower extremity edema.  The patient denies any syncope or presyncope.  She continues to walk primarily on the treadmill without any limitations.      PHYSICAL EXAMINATION:  Dictated below.      LABORATORY STUDIES:  Lipids on 07/22/2020 demonstrated total cholesterol 151, HDL 79, LDL 63, triglycerides 46.      IMPRESSION:  Coronary artery disease status post PCI to the LAD in 2015 as described above.  Coronary angiography in 2019 demonstrated a patent stent in the LAD with ostial narrowing of the second diagonal which was stable compared to her previous angiogram in 2015.      The patient is doing well overall from a cardiovascular standpoint.  There is no evidence of angina or congestive heart failure.  Assuming her clinical status remains stable, I will plan to follow up with her in approximately 1 year.  I will, however, obtain an echocardiogram prior to that to reassess left ventricular systolic function.      It was a pleasure seeing her in followup today.         REMBERTO AGUILERA MD             D: 09/18/2020   T: 09/18/2020   MT: TARA      Name:      SEBASTIEN SAMUEL   MRN:      [id number]        Account:      [id number]   :      1960           Service Date: 2020      Document: [id number]       Thank you for allowing me to participate in the care of your patient.      Sincerely,     Radha Meier MD     [facility] [place]    cc:   Rosalba Liu MD  [address] [place] [address]

## 2020-09-18 NOTE — LETTER
9/18/2020      [facility]  [address]  [facility] [address]      RE: Glenny LAURENT Beckie       Dear Colleague,    I had the pleasure of seeing Glenny Butts in the [facility].    Service Date: 09/18/2020      CARDIOLOGY FOLLOWUP       HISTORY OF PRESENT ILLNESS:  I had the pleasure of seeing Ms. Butts in followup at [facility] today.  She is a very pleasant 60-year-old female who I last saw in 09/2019.  She has a history of coronary artery disease and is status post non-ST elevation myocardial infarction for which she underwent PCI to the LAD in 2015.  She underwent repeat coronary angiography in 2019 for chest discomfort and was found to have a widely patent stent in the LAD without evidence of restenosis.  Ostial narrowing of the second diagonal branch in the 80%-90% range was noted but unchanged compared to her prior angiogram.  Mild-to-moderate nonobstructive disease was noted elsewhere and medical therapy was recommended.      Today, she feels well overall from a cardiac standpoint.  She specifically denies any chest pain, dyspnea on exertion, PND, orthopnea or lower extremity edema.  The patient denies any syncope or presyncope.  She continues to walk primarily on the treadmill without any limitations.      PHYSICAL EXAMINATION:  Dictated below.      LABORATORY STUDIES:  Lipids on 07/22/2020 demonstrated total cholesterol 151, HDL 79, LDL 63, triglycerides 46.      IMPRESSION:  Coronary artery disease status post PCI to the LAD in 2015 as described above.  Coronary angiography in 2019 demonstrated a patent stent in the LAD with ostial narrowing of the second diagonal which was stable compared to her previous angiogram in 2015.      The patient is doing well overall from a cardiovascular standpoint.  There is no evidence of angina or congestive heart failure.  Assuming her clinical status remains stable, I will plan to follow  up with her in approximately 1 year.  I will, however, obtain an echocardiogram prior to that to reassess left ventricular systolic function.      It was a pleasure seeing her in followup today.         REMBERTO MEIER MD             D: 2020   T: 2020   MT: TARA      Name:     SEBASTIEN SAMUEL   MRN:      [id number]        Account:      [id number]   :      1960           Service Date: 2020      Document: [id number]          Outpatient Encounter Medications as of 2020   Medication Sig Dispense Refill     acetaminophen (TYLENOL) 325 MG tablet Take 325-650 mg by mouth every 6 hours as needed for mild pain       ASPIRIN PO Take 81 mg by mouth daily       atorvastatin (LIPITOR) 40 MG tablet Take 1 tablet (40 mg) by mouth daily 90 tablet 3     calcium citrate (CITRACAL) 950 MG tablet Take 1 tablet by mouth 2 times daily       DiphenhydrAMINE HCl (BENADRYL PO) Take 25 mg by mouth as needed Reported on 2017       doxylamine (UNISOM) 25 MG TABS Take 12.5 mg by mouth nightly as needed        lisinopril (PRINIVIL/ZESTRIL) 2.5 MG tablet Take 1 tablet (2.5 mg) by mouth daily 90 tablet 3     nitroGLYcerin (NITROSTAT) 0.4 MG sublingual tablet Place 1 tablet (0.4 mg) under the tongue every 5 minutes as needed for chest pain 25 tablet 1     pantoprazole (PROTONIX) 40 MG EC tablet Take 1 tablet (40 mg) by mouth daily Take 30-60 minutes before a meal. 30 tablet 0     Vitamin D, Cholecalciferol, 400 units TABS Take 800 Units by mouth daily       No facility-administered encounter medications on file as of 2020.        Again, thank you for allowing me to participate in the care of your patient.      Sincerely,    Remberto Meier MD     [facility] [place] [facility]    cc:   Rosalba Liu MD  [address] [place] [address]

## 2020-09-18 NOTE — PROGRESS NOTES
Service Date: 09/18/2020      CARDIOLOGY FOLLOWUP       HISTORY OF PRESENT ILLNESS:  I had the pleasure of seeing Ms. Butts in followup at [facility] today.  She is a very pleasant 60-year-old female who I last saw in 09/2019.  She has a history of coronary artery disease and is status post non-ST elevation myocardial infarction for which she underwent PCI to the LAD in 2015.  She underwent repeat coronary angiography in 2019 for chest discomfort and was found to have a widely patent stent in the LAD without evidence of restenosis.  Ostial narrowing of the second diagonal branch in the 80%-90% range was noted but unchanged compared to her prior angiogram.  Mild-to-moderate nonobstructive disease was noted elsewhere and medical therapy was recommended.      Today, she feels well overall from a cardiac standpoint.  She specifically denies any chest pain, dyspnea on exertion, PND, orthopnea or lower extremity edema.  The patient denies any syncope or presyncope.  She continues to walk primarily on the treadmill without any limitations.      PHYSICAL EXAMINATION:  Dictated below.      LABORATORY STUDIES:  Lipids on 07/22/2020 demonstrated total cholesterol 151, HDL 79, LDL 63, triglycerides 46.      IMPRESSION:  Coronary artery disease status post PCI to the LAD in 2015 as described above.  Coronary angiography in 2019 demonstrated a patent stent in the LAD with ostial narrowing of the second diagonal which was stable compared to her previous angiogram in 2015.      The patient is doing well overall from a cardiovascular standpoint.  There is no evidence of angina or congestive heart failure.  Assuming her clinical status remains stable, I will plan to follow up with her in approximately 1 year.  I will, however, obtain an echocardiogram prior to that to reassess left ventricular systolic function.      It was a pleasure seeing her in followup today.         REMBERTO AGUILERA MD              D: 2020   T: 2020   MT: TARA      Name:     SEBASTIEN SAMUEL   MRN:      [id number]        Account:      [id number]   :      1960           Service Date: 2020      Document: [id number]

## 2020-10-27 DIAGNOSIS — I25.10 CORONARY ARTERY DISEASE INVOLVING NATIVE CORONARY ARTERY OF NATIVE HEART WITHOUT ANGINA PECTORIS: ICD-10-CM

## 2020-10-27 RX ORDER — ATORVASTATIN CALCIUM 40 MG/1
40 TABLET, FILM COATED ORAL DAILY
Qty: 90 TABLET | Refills: 3 | Status: SHIPPED | OUTPATIENT
Start: 2020-10-27 | End: 2021-11-02

## 2020-10-27 RX ORDER — LISINOPRIL 2.5 MG/1
2.5 TABLET ORAL DAILY
Qty: 90 TABLET | Refills: 3 | Status: SHIPPED | OUTPATIENT
Start: 2020-10-27 | End: 2021-11-02

## 2020-11-29 ENCOUNTER — HEALTH MAINTENANCE LETTER (OUTPATIENT)
Age: 60
End: 2020-11-29

## 2021-01-27 ENCOUNTER — MYC MEDICAL ADVICE (OUTPATIENT)
Dept: CARDIOLOGY | Facility: CLINIC | Age: 61
End: 2021-01-27

## 2021-01-27 NOTE — TELEPHONE ENCOUNTER
My chart message from patient:  Is Dr. Meier recommending the COVID vaccination for patient's who have had a heart attack?     Thank you for your response!     Maribel Beckie       Reply to patient via My Chart:    Good morning, Ms. Butts,    Yes, Dr. Meier prefers that all his patient get a COVID-19 vaccination as soon as available.    The [facility] will plan the roll-out of vaccines. As a specialty clinic, [facility] will not be giving injections.  We have not been told if validation will be needed to plan for the stages of vaccinations. Most primary clinics will have access to the Epic system records to identify which patients meet the protocols.  The web site for the  [facility] is the best place to follow for up-to-date instructions for planning the timing of vaccinations.    Thank you  Team 2 RNs  [phone]

## 2021-01-28 ENCOUNTER — IMMUNIZATION (OUTPATIENT)
Dept: NURSING | Facility: CLINIC | Age: 61
End: 2021-01-28
Payer: COMMERCIAL

## 2021-01-28 PROCEDURE — 0001A PR COVID VAC PFIZER DIL RECON 30 MCG/0.3 ML IM: CPT

## 2021-01-28 PROCEDURE — 91300 PR COVID VAC PFIZER DIL RECON 30 MCG/0.3 ML IM: CPT

## 2021-02-18 ENCOUNTER — IMMUNIZATION (OUTPATIENT)
Dept: NURSING | Facility: CLINIC | Age: 61
End: 2021-02-18
Attending: INTERNAL MEDICINE
Payer: COMMERCIAL

## 2021-02-18 PROCEDURE — 0002A PR COVID VAC PFIZER DIL RECON 30 MCG/0.3 ML IM: CPT

## 2021-02-18 PROCEDURE — 91300 PR COVID VAC PFIZER DIL RECON 30 MCG/0.3 ML IM: CPT

## 2021-02-24 ENCOUNTER — TELEPHONE (OUTPATIENT)
Dept: CARDIOLOGY | Facility: CLINIC | Age: 61
End: 2021-02-24

## 2021-02-24 NOTE — TELEPHONE ENCOUNTER
Patient called wondering when her next Dr. Meier visit and echo is due.  Last Dr. Meier visit 9-18-20. Recommendation follow up in 1 yr and echo prior.

## 2021-06-01 DIAGNOSIS — I25.5 ISCHEMIC CARDIOMYOPATHY: ICD-10-CM

## 2021-06-01 DIAGNOSIS — I25.10 CORONARY ARTERY DISEASE INVOLVING NATIVE CORONARY ARTERY OF NATIVE HEART WITHOUT ANGINA PECTORIS: Primary | ICD-10-CM

## 2021-06-01 NOTE — PROGRESS NOTES
"Order placed for previsit echo.     Last OV 9/2020 w/ Dr Meier: \"The patient is doing well overall from a cardiovascular standpoint.  There is no evidence of angina or congestive heart failure.  Assuming her clinical status remains stable, I will plan to follow up with her in approximately 1 year.  I will, however, obtain an echocardiogram prior to that to reassess left ventricular systolic function.\"   "

## 2021-09-17 ENCOUNTER — HOSPITAL ENCOUNTER (OUTPATIENT)
Dept: CARDIOLOGY | Facility: CLINIC | Age: 61
Discharge: HOME | End: 2021-09-17
Attending: INTERNAL MEDICINE | Admitting: INTERNAL MEDICINE
Payer: COMMERCIAL

## 2021-09-17 DIAGNOSIS — I25.5 ISCHEMIC CARDIOMYOPATHY: ICD-10-CM

## 2021-09-17 DIAGNOSIS — I25.10 CORONARY ARTERY DISEASE INVOLVING NATIVE CORONARY ARTERY OF NATIVE HEART WITHOUT ANGINA PECTORIS: ICD-10-CM

## 2021-09-17 LAB — LVEF ECHO: NORMAL

## 2021-09-17 PROCEDURE — 93306 TTE W/DOPPLER COMPLETE: CPT | Mod: 26 | Performed by: INTERNAL MEDICINE

## 2021-09-17 PROCEDURE — 93306 TTE W/DOPPLER COMPLETE: CPT

## 2021-09-19 NOTE — PROGRESS NOTES
HPI and Plan:     I had the pleasure of seeing Ms. Butts in followup at [facility] today.  She is a very pleasant 61-year-old female who I last saw in 09/2020.  She has a history of coronary artery disease and is status post non-ST elevation myocardial infarction for which she underwent PCI to the LAD in 2015.  She underwent repeat coronary angiography in 2019 for chest discomfort and was found to have a widely patent stent in the LAD without evidence of restenosis.  Ostial narrowing of the second diagonal branch in the 80%-90% range was noted but unchanged compared to her prior angiogram.  Mild-to-moderate nonobstructive disease was noted elsewhere and medical therapy was recommended.      Today, she feels well overall from a cardiac standpoint.  She specifically denies any chest pain, dyspnea on exertion, PND, orthopnea or lower extremity edema.  The patient denies any syncope or presyncope.  She continues to exercise regularly without any limitations.     PHYSICAL EXAMINATION:  Dictated below.      LABORATORY STUDIES:  Lipids on 07/22/2020 demonstrated total cholesterol 151, HDL 79, LDL 63, triglycerides 46.     I went over the results of her echocardiogram obtained on 9/17/2021.  This demonstrated normal left ventricular systolic function with no hemodynamically significant valvular disease.     IMPRESSION:  Coronary artery disease status post PCI to the LAD in 2015 as described above.  Coronary angiography in 2019 demonstrated a patent stent in the LAD with ostial narrowing of the second diagonal which was stable compared to her previous angiogram in 2015.     The patient is doing well overall from a cardiovascular standpoint without evidence of angina or congestive heart failure.  I will obtain an updated lipid panel today for reassessment of her lipid parameters.  Previously they have been under excellent control on atorvastatin 40 mg daily.    She did have some questions  regarding the keto diet and whether this would be beneficial from a cardiovascular standpoint.  As I explained to her, the available evidence would suggest that a pescetarian diet is probably the most beneficial from an overall cardiovascular health perspective.    It was a pleasure seeing her in follow-up today.  Assuming her clinical status remains stable, I will plan a follow-up in approximately 1 year.                    CURRENT MEDICATIONS:  Current Outpatient Medications   Medication Sig Dispense Refill     acetaminophen (TYLENOL) 325 MG tablet Take 325-650 mg by mouth every 6 hours as needed for mild pain       ASPIRIN PO Take 81 mg by mouth daily       atorvastatin (LIPITOR) 40 MG tablet Take 1 tablet (40 mg) by mouth daily 90 tablet 3     calcium citrate (CITRACAL) 950 MG tablet Take 1 tablet by mouth 2 times daily       DiphenhydrAMINE HCl (BENADRYL PO) Take 25 mg by mouth as needed Reported on 2/17/2017       doxylamine (UNISOM) 25 MG TABS Take 12.5 mg by mouth nightly as needed        lisinopril (ZESTRIL) 2.5 MG tablet Take 1 tablet (2.5 mg) by mouth daily 90 tablet 3     nitroGLYcerin (NITROSTAT) 0.4 MG sublingual tablet Place 1 tablet (0.4 mg) under the tongue every 5 minutes as needed for chest pain 25 tablet 1     pantoprazole (PROTONIX) 40 MG EC tablet Take 1 tablet (40 mg) by mouth daily Take 30-60 minutes before a meal. 30 tablet 0     Vitamin D, Cholecalciferol, 400 units TABS Take 800 Units by mouth daily         ALLERGIES     Allergies   Allergen Reactions     Dust Mites      Grass      Trees        PAST MEDICAL HISTORY:  Past Medical History:   Diagnosis Date     CAD (coronary artery disease) 2/15    Stent to LAD 01/2015     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      Hyperlipidemia      Ischemic cardiomyopathy      NSTEMI (non-ST elevated myocardial infarction) (H) 2/15     S/P coronary angiogram 10-5-2015    patent prev placed stent-aggressive medical management     Stress incontinence       Syncope        PAST SURGICAL HISTORY:  Past Surgical History:   Procedure Laterality Date     C DEXA, BONE DENSITY, AXIAL SKEL       CHOLECYSTECTOMY       COLONOSCOPY       HEART CATH, ANGIOPLASTY  1/27/15     mid LAD-2.75 X 28 mm Promus premier FAM      HYSTERECTOMY VAGINAL N/A 12/19/2014    Procedure: HYSTERECTOMY VAGINAL;  Surgeon: Aarti Ramirez MD;  Location: [facility] OR     wisdom teeth         FAMILY HISTORY:  Family History   Problem Relation Age of Onset     Dementia Mother      Myocardial Infarction Father 34        MI     Hypertension Brother      Hypertension Brother        SOCIAL HISTORY:  Social History     Socioeconomic History     Marital status:      Spouse name: Not on file     Number of children: Not on file     Years of education: Not on file     Highest education level: Not on file   Occupational History     Not on file   Tobacco Use     Smoking status: Never Smoker     Smokeless tobacco: Never Used   Substance and Sexual Activity     Alcohol use: Yes     Comment: 4 drinks per month     Drug use: No     Sexual activity: Not on file   Other Topics Concern      Service Not Asked     Blood Transfusions Not Asked     Caffeine Concern No     Comment: cup of tea, no coffee     Occupational Exposure Not Asked     Hobby Hazards Not Asked     Sleep Concern Yes     Comment: takes OTC med     Stress Concern No     Weight Concern No     Special Diet Yes     Comment: LOW FAT, low sugar     Back Care Not Asked     Exercise Yes     Comment:  4 times week, brisk walking treadmill, elyptical     Bike Helmet Not Asked     Seat Belt Yes     Self-Exams Not Asked     Parent/sibling w/ CABG, MI or angioplasty before 65F 55M? Yes   Social History Narrative     Not on file     Social Determinants of Health     Financial Resource Strain:      Difficulty of Paying Living Expenses:    Food Insecurity:      Worried About Running Out of Food in the Last Year:      Ran Out of Food in the Last Year:     Transportation Needs:      Lack of Transportation (Medical):      Lack of Transportation (Non-Medical):    Physical Activity:      Days of Exercise per Week:      Minutes of Exercise per Session:    Stress:      Feeling of Stress :    Social Connections:      Frequency of Communication with Friends and Family:      Frequency of Social Gatherings with Friends and Family:      Attends [employer] Services:      Active Member of Clubs or Organizations:      Attends Club or Organization Meetings:      Marital Status:    Intimate Partner Violence:      Fear of Current or Ex-Partner:      Emotionally Abused:      Physically Abused:      Sexually Abused:        Review of Systems:  Skin:          Eyes:         ENT:         Respiratory:          Cardiovascular:         Gastroenterology:        Genitourinary:         Musculoskeletal:         Neurologic:         Psychiatric:         Heme/Lymph/Imm:         Endocrine:           Physical Exam:  Vitals: LMP 08/20/2014 (Exact Date)     Constitutional:  cooperative, alert and oriented, well developed, well nourished, in no acute distress        Skin:  warm and dry to the touch, no apparent skin lesions or masses noted          Head:  normocephalic, no masses or lesions        Eyes:  pupils equal and round        Lymph:      ENT:  no pallor or cyanosis, dentition good        Neck:           Respiratory:  clear to auscultation         Cardiac: regular rhythm                pulses full and equal, no bruits auscultated                                        GI:  abdomen soft        Extremities and Muscular Skeletal:  no edema              Neurological:  no gross motor deficits        Psych:  Alert and Oriented x 3        CC  No referring provider defined for this encounter.

## 2021-09-20 ENCOUNTER — TELEPHONE (OUTPATIENT)
Dept: CARDIOLOGY | Facility: CLINIC | Age: 61
End: 2021-09-20

## 2021-09-20 ENCOUNTER — OFFICE VISIT (OUTPATIENT)
Dept: CARDIOLOGY | Facility: CLINIC | Age: 61
End: 2021-09-20
Payer: COMMERCIAL

## 2021-09-20 ENCOUNTER — LAB (OUTPATIENT)
Dept: LAB | Facility: CLINIC | Age: 61
End: 2021-09-20
Payer: COMMERCIAL

## 2021-09-20 VITALS
SYSTOLIC BLOOD PRESSURE: 125 MMHG | BODY MASS INDEX: 19.44 KG/M2 | HEIGHT: 66 IN | DIASTOLIC BLOOD PRESSURE: 70 MMHG | WEIGHT: 121 LBS | HEART RATE: 57 BPM

## 2021-09-20 DIAGNOSIS — I25.10 CORONARY ARTERY DISEASE INVOLVING NATIVE CORONARY ARTERY OF NATIVE HEART WITHOUT ANGINA PECTORIS: Primary | ICD-10-CM

## 2021-09-20 DIAGNOSIS — I25.5 ISCHEMIC CARDIOMYOPATHY: ICD-10-CM

## 2021-09-20 DIAGNOSIS — I25.10 CORONARY ARTERY DISEASE INVOLVING NATIVE CORONARY ARTERY OF NATIVE HEART WITHOUT ANGINA PECTORIS: ICD-10-CM

## 2021-09-20 DIAGNOSIS — E78.00 PURE HYPERCHOLESTEROLEMIA: ICD-10-CM

## 2021-09-20 LAB
ALBUMIN SERPL-MCNC: 3.5 G/DL (ref 3.4–5)
ALP SERPL-CCNC: 69 U/L (ref 40–150)
ALT SERPL W P-5'-P-CCNC: 33 U/L (ref 0–50)
ANION GAP SERPL CALCULATED.3IONS-SCNC: 5 MMOL/L (ref 3–14)
AST SERPL W P-5'-P-CCNC: 32 U/L (ref 0–45)
BILIRUB SERPL-MCNC: 0.4 MG/DL (ref 0.2–1.3)
BUN SERPL-MCNC: 21 MG/DL (ref 7–30)
CALCIUM SERPL-MCNC: 9.3 MG/DL (ref 8.5–10.1)
CHLORIDE BLD-SCNC: 106 MMOL/L (ref 94–109)
CHOLEST SERPL-MCNC: 141 MG/DL
CO2 SERPL-SCNC: 28 MMOL/L (ref 20–32)
CREAT SERPL-MCNC: 0.8 MG/DL (ref 0.52–1.04)
DEPRECATED CALCIDIOL+CALCIFEROL SERPL-MC: 41 UG/L (ref 20–75)
FASTING STATUS PATIENT QL REPORTED: NO
GFR SERPL CREATININE-BSD FRML MDRD: 79 ML/MIN/1.73M2
GLUCOSE BLD-MCNC: 88 MG/DL (ref 70–99)
HDLC SERPL-MCNC: 88 MG/DL
LDLC SERPL CALC-MCNC: 43 MG/DL
NONHDLC SERPL-MCNC: 53 MG/DL
POTASSIUM BLD-SCNC: 4.2 MMOL/L (ref 3.4–5.3)
PROT SERPL-MCNC: 7.2 G/DL (ref 6.8–8.8)
SODIUM SERPL-SCNC: 139 MMOL/L (ref 133–144)
TRIGL SERPL-MCNC: 48 MG/DL

## 2021-09-20 PROCEDURE — 82306 VITAMIN D 25 HYDROXY: CPT | Performed by: INTERNAL MEDICINE

## 2021-09-20 PROCEDURE — 80061 LIPID PANEL: CPT | Performed by: INTERNAL MEDICINE

## 2021-09-20 PROCEDURE — 80053 COMPREHEN METABOLIC PANEL: CPT | Performed by: INTERNAL MEDICINE

## 2021-09-20 PROCEDURE — 36415 COLL VENOUS BLD VENIPUNCTURE: CPT | Performed by: INTERNAL MEDICINE

## 2021-09-20 PROCEDURE — 99213 OFFICE O/P EST LOW 20 MIN: CPT | Performed by: INTERNAL MEDICINE

## 2021-09-20 RX ORDER — CALCIUM CARBONATE/VITAMIN D3 500-10/5ML
LIQUID (ML) ORAL
COMMUNITY

## 2021-09-20 RX ORDER — CALCIUM CARBONATE/VITAMIN D3 600 MG-10
50 TABLET ORAL DAILY
COMMUNITY

## 2021-09-20 ASSESSMENT — MIFFLIN-ST. JEOR: SCORE: 1120.6

## 2021-09-20 NOTE — LETTER
9/20/2021    [facility]  [address]  [facility] [id number]    RE: Glenny Wadsworthersen       Dear Colleague,    I had the pleasure of seeing Glenny Butts in the [facility].    HPI and Plan:     I had the pleasure of seeing Ms. Butts in followup at [facility] today.  She is a very pleasant 61-year-old female who I last saw in 09/2020.  She has a history of coronary artery disease and is status post non-ST elevation myocardial infarction for which she underwent PCI to the LAD in 2015.  She underwent repeat coronary angiography in 2019 for chest discomfort and was found to have a widely patent stent in the LAD without evidence of restenosis.  Ostial narrowing of the second diagonal branch in the 80%-90% range was noted but unchanged compared to her prior angiogram.  Mild-to-moderate nonobstructive disease was noted elsewhere and medical therapy was recommended.      Today, she feels well overall from a cardiac standpoint.  She specifically denies any chest pain, dyspnea on exertion, PND, orthopnea or lower extremity edema.  The patient denies any syncope or presyncope.  She continues to exercise regularly without any limitations.     PHYSICAL EXAMINATION:  Dictated below.      LABORATORY STUDIES:  Lipids on 07/22/2020 demonstrated total cholesterol 151, HDL 79, LDL 63, triglycerides 46.     I went over the results of her echocardiogram obtained on 9/17/2021.  This demonstrated normal left ventricular systolic function with no hemodynamically significant valvular disease.     IMPRESSION:  Coronary artery disease status post PCI to the LAD in 2015 as described above.  Coronary angiography in 2019 demonstrated a patent stent in the LAD with ostial narrowing of the second diagonal which was stable compared to her previous angiogram in 2015.     The patient is doing well overall from a cardiovascular standpoint  without evidence of angina or congestive heart failure.  I will obtain an updated lipid panel today for reassessment of her lipid parameters.  Previously they have been under excellent control on atorvastatin 40 mg daily.    She did have some questions regarding the keto diet and whether this would be beneficial from a cardiovascular standpoint.  As I explained to her, the available evidence would suggest that a pescetarian diet is probably the most beneficial from an overall cardiovascular health perspective.    It was a pleasure seeing her in follow-up today.  Assuming her clinical status remains stable, I will plan a follow-up in approximately 1 year.                    CURRENT MEDICATIONS:  Current Outpatient Medications   Medication Sig Dispense Refill     acetaminophen (TYLENOL) 325 MG tablet Take 325-650 mg by mouth every 6 hours as needed for mild pain       ASPIRIN PO Take 81 mg by mouth daily       atorvastatin (LIPITOR) 40 MG tablet Take 1 tablet (40 mg) by mouth daily 90 tablet 3     calcium citrate (CITRACAL) 950 MG tablet Take 1 tablet by mouth 2 times daily       DiphenhydrAMINE HCl (BENADRYL PO) Take 25 mg by mouth as needed Reported on 2/17/2017       doxylamine (UNISOM) 25 MG TABS Take 12.5 mg by mouth nightly as needed        lisinopril (ZESTRIL) 2.5 MG tablet Take 1 tablet (2.5 mg) by mouth daily 90 tablet 3     nitroGLYcerin (NITROSTAT) 0.4 MG sublingual tablet Place 1 tablet (0.4 mg) under the tongue every 5 minutes as needed for chest pain 25 tablet 1     pantoprazole (PROTONIX) 40 MG EC tablet Take 1 tablet (40 mg) by mouth daily Take 30-60 minutes before a meal. 30 tablet 0     Vitamin D, Cholecalciferol, 400 units TABS Take 800 Units by mouth daily         ALLERGIES     Allergies   Allergen Reactions     Dust Mites      Grass      Trees        PAST MEDICAL HISTORY:  Past Medical History:   Diagnosis Date     CAD (coronary artery disease) 2/15    Stent to LAD 01/2015     Gall bladder disease       HPV in female      Hyperlipidemia      Ischemic cardiomyopathy      NSTEMI (non-ST elevated myocardial infarction) (H) 2/15     S/P coronary angiogram 10-5-2015    patent prev placed stent-aggressive medical management     Stress incontinence      Syncope        PAST SURGICAL HISTORY:  Past Surgical History:   Procedure Laterality Date     C DEXA, BONE DENSITY, AXIAL SKEL       CHOLECYSTECTOMY       COLONOSCOPY       HEART CATH, ANGIOPLASTY  1/27/15     mid LAD-2.75 X 28 mm Promus premier FAM      HYSTERECTOMY VAGINAL N/A 12/19/2014    Procedure: HYSTERECTOMY VAGINAL;  Surgeon: Aarti Ramirez MD;  Location:  OR     wisdom teeth         FAMILY HISTORY:  Family History   Problem Relation Age of Onset     Dementia Mother      Myocardial Infarction Father 34        MI     Hypertension Brother      Hypertension Brother        SOCIAL HISTORY:  Social History     Socioeconomic History     Marital status:      Spouse name: Not on file     Number of children: Not on file     Years of education: Not on file     Highest education level: Not on file   Occupational History     Not on file   Tobacco Use     Smoking status: Never Smoker     Smokeless tobacco: Never Used   Substance and Sexual Activity     Alcohol use: Yes     Comment: 4 drinks per month     Drug use: No     Sexual activity: Not on file   Other Topics Concern      Service Not Asked     Blood Transfusions Not Asked     Caffeine Concern No     Comment: cup of tea, no coffee     Occupational Exposure Not Asked     Hobby Hazards Not Asked     Sleep Concern Yes     Comment: takes OTC med     Stress Concern No     Weight Concern No     Special Diet Yes     Comment: LOW FAT, low sugar     Back Care Not Asked     Exercise Yes     Comment:  4 times week, brisk walking treadmill, elyptical     Bike Helmet Not Asked     Seat Belt Yes     Self-Exams Not Asked     Parent/sibling w/ CABG, MI or angioplasty before 65F 55M? Yes   Social History Narrative      Not on file     Social Determinants of Health     Financial Resource Strain:      Difficulty of Paying Living Expenses:    Food Insecurity:      Worried About Running Out of Food in the Last Year:      Ran Out of Food in the Last Year:    Transportation Needs:      Lack of Transportation (Medical):      Lack of Transportation (Non-Medical):    Physical Activity:      Days of Exercise per Week:      Minutes of Exercise per Session:    Stress:      Feeling of Stress :    Social Connections:      Frequency of Communication with Friends and Family:      Frequency of Social Gatherings with Friends and Family:      Attends [employer] Services:      Active Member of Clubs or Organizations:      Attends Club or Organization Meetings:      Marital Status:    Intimate Partner Violence:      Fear of Current or Ex-Partner:      Emotionally Abused:      Physically Abused:      Sexually Abused:        Review of Systems:  Skin:          Eyes:         ENT:         Respiratory:          Cardiovascular:         Gastroenterology:        Genitourinary:         Musculoskeletal:         Neurologic:         Psychiatric:         Heme/Lymph/Imm:         Endocrine:           Physical Exam:  Vitals: LMP 08/20/2014 (Exact Date)     Constitutional:  cooperative, alert and oriented, well developed, well nourished, in no acute distress        Skin:  warm and dry to the touch, no apparent skin lesions or masses noted          Head:  normocephalic, no masses or lesions        Eyes:  pupils equal and round        Lymph:      ENT:  no pallor or cyanosis, dentition good        Neck:           Respiratory:  clear to auscultation         Cardiac: regular rhythm                pulses full and equal, no bruits auscultated                                        GI:  abdomen soft        Extremities and Muscular Skeletal:  no edema              Neurological:  no gross motor deficits        Psych:  Alert and Oriented x 3        CC  No referring provider  defined for this encounter.                  Thank you for allowing me to participate in the care of your patient.      Sincerely,     Radha Meier MD     [facility]  cc:   No referring provider defined for this encounter.

## 2021-09-20 NOTE — TELEPHONE ENCOUNTER
Lipids and bmp 9/20/2021 noted. Ordered post-OV same day for annual follow up.    Component      Latest Ref Rng & Units 9/20/2021   Sodium      133 - 144 mmol/L 139   Potassium      3.4 - 5.3 mmol/L 4.2   Chloride      94 - 109 mmol/L 106   Carbon Dioxide      20 - 32 mmol/L 28   Anion Gap      3 - 14 mmol/L 5   Urea Nitrogen      7 - 30 mg/dL 21   Creatinine      0.52 - 1.04 mg/dL 0.80   Calcium      8.5 - 10.1 mg/dL 9.3   Glucose      70 - 99 mg/dL 88   Alkaline Phosphatase      40 - 150 U/L 69   AST      0 - 45 U/L 32   ALT      0 - 50 U/L 33   Protein Total      6.8 - 8.8 g/dL 7.2   Albumin      3.4 - 5.0 g/dL 3.5   Bilirubin Total      0.2 - 1.3 mg/dL 0.4   GFR Estimate      >60 mL/min/1.73m2 79   Cholesterol      <200 mg/dL 141   Triglycerides      <150 mg/dL 48   HDL Cholesterol      >=50 mg/dL 88   LDL Cholesterol Calculated      <=100 mg/dL 43   Non HDL Cholesterol      <130 mg/dL 53   Patient Fasting > 8hrs?       No     Vitamin D is pending    Will message Dr. Meier to review

## 2021-09-21 ENCOUNTER — TELEPHONE (OUTPATIENT)
Dept: CARDIOLOGY | Facility: CLINIC | Age: 61
End: 2021-09-21

## 2021-09-25 ENCOUNTER — HEALTH MAINTENANCE LETTER (OUTPATIENT)
Age: 61
End: 2021-09-25

## 2021-10-18 ENCOUNTER — MYC MEDICAL ADVICE (OUTPATIENT)
Dept: CARDIOLOGY | Facility: CLINIC | Age: 61
End: 2021-10-18

## 2021-10-20 NOTE — TELEPHONE ENCOUNTER
For statin agents the LDL reduction is the most important aspect of therapy. Since her LDL is at target on atorvastatin there is no need for adjusting agents. Thanks.

## 2021-10-20 NOTE — TELEPHONE ENCOUNTER
My chart message from patient:   Good morning,  I am currently on 40mg of Lipitor  and I am wondering which statin Dr. Meier thinks is better Lipitor or Crestor.  My health insurance for some reason is paying for Crestor in full.  If Dr. Meier thinks Lipitor is best for me I will stay on the Lipitor (it's not very expensive) but if he feels that Crestor overall is better I would like to switch to Crestor.  Please advise.     Thank you!     Maribel      Dr. Meier's reply sent to patient on her My Chart account.

## 2021-11-02 ENCOUNTER — MYC MEDICAL ADVICE (OUTPATIENT)
Dept: CARDIOLOGY | Facility: CLINIC | Age: 61
End: 2021-11-02

## 2021-11-02 DIAGNOSIS — I25.10 CORONARY ARTERY DISEASE INVOLVING NATIVE CORONARY ARTERY OF NATIVE HEART WITHOUT ANGINA PECTORIS: ICD-10-CM

## 2021-11-02 RX ORDER — LISINOPRIL 2.5 MG/1
2.5 TABLET ORAL DAILY
Qty: 90 TABLET | Refills: 3 | Status: SHIPPED | OUTPATIENT
Start: 2021-11-02 | End: 2022-09-19

## 2021-11-02 RX ORDER — ATORVASTATIN CALCIUM 40 MG/1
40 TABLET, FILM COATED ORAL DAILY
Qty: 90 TABLET | Refills: 3 | Status: SHIPPED | OUTPATIENT
Start: 2021-11-02 | End: 2022-09-19

## 2021-11-20 ENCOUNTER — HEALTH MAINTENANCE LETTER (OUTPATIENT)
Age: 61
End: 2021-11-20

## 2021-11-22 ENCOUNTER — MYC MEDICAL ADVICE (OUTPATIENT)
Dept: CARDIOLOGY | Facility: CLINIC | Age: 61
End: 2021-11-22
Payer: COMMERCIAL

## 2021-11-22 NOTE — TELEPHONE ENCOUNTER
"  Glenny Butts, Radha Biswas MD 1 hour ago (8:39 AM)     Good morning,     About a year ago I had asked Dr. Meier if there was any type of estrogen I would be able to take even though I had a heart attack. He was kind enough to give me a name but unfornately I can not find the name anywhere.  Would you please ask him again, I know he has already done this for me so apologize for the inconvenience.     Maribel         Note from Dr Meier 9/23/19- \"This patient wanted some recommendations regarding hormone therapy for funmilayo-menopausal symptoms. I would recommend she look at the option of transdermal estrogen, which has been demonstrated to be safer from a CVD and stroke perspective. Thanks.\"     Information sent to patient via [employer].   "

## 2021-11-22 NOTE — TELEPHONE ENCOUNTER
My chart message from patient:  Glenny Butts Tamra [facility] 2  I am sorry to bother you again but I just spoke with my gynegcologist office and Dr. Horner would like a copy of the office note that states that Dr. Meier would recommend transdermal estrogen for me.  I asked if they would accept our messages from this morning but she would like the note from Dr. Meier.  This is getting harder than I thought it would be. It doesn't help that my original doctor Dr. Ramirez retired and I have only seen Dr. Horner once.   Dr Horner's fax number is [phone].  Anything you could do to help I would really appreciate it. If possible if you could let me know if you faxed anything I would appreciate it.       Maribel       Faxed office note 9/16/2019 and phone note 9/23/20219 to Dr. Horner @ [phone] as requested.    Reply to patient:    Ms. Nolberto Beckie,    We faxed the office note froom 2019 to Dr. Horner at 4:30 PM today. Thank you for locating the fax number.    Team 2 RNs  [phone]

## 2022-01-15 ENCOUNTER — HEALTH MAINTENANCE LETTER (OUTPATIENT)
Age: 62
End: 2022-01-15

## 2022-04-01 ENCOUNTER — TELEPHONE (OUTPATIENT)
Dept: CARDIOLOGY | Facility: CLINIC | Age: 62
End: 2022-04-01
Payer: COMMERCIAL

## 2022-04-01 DIAGNOSIS — I25.10 CORONARY ARTERY DISEASE INVOLVING NATIVE CORONARY ARTERY OF NATIVE HEART WITHOUT ANGINA PECTORIS: Primary | ICD-10-CM

## 2022-04-01 NOTE — TELEPHONE ENCOUNTER
[facility] Call Center    Phone Message    May a detailed message be left on voicemail: yes     Reason for Call: Symptoms or Concerns     If patient has red-flag symptoms, warm transfer to triage line    Current symptom or concern: The patient noticed when she is exercising that her heart rate is going up to 190, it did not stay there the whole walk it fluctuates. PT said she had no pain and no SOB but she felt a flutter. At rest her heart rate is normal    Symptoms have been present for:  A few  Week(s) ago    Has patient previously been seen for this? No      Are there any new or worsening symptoms? No      Action Taken: Other: cardiology    Travel Screening: Not Applicable

## 2022-04-01 NOTE — TELEPHONE ENCOUNTER
Spoke to patient and reviewed recommendation for stress echo. Pt verbalized understanding and was agreeable to plan. Scheduling phone number provided.

## 2022-04-01 NOTE — TELEPHONE ENCOUNTER
Spoke with Patient who states this last week she has noticed 2 times that when walking her HR increases up to 190's feels like fluttering and has a little extremity tingling but no chest pain or shortness of breath.  Patient states she exercises regularly  on a treadmill  and tries to maintain a HR in the 120-130's  But now notes that her HR increased more rapidly and goes  past her usually of 130's.    Patient states her HR usually runs 46-60's and 120-130's with at exercise.  HR returns back to the  50's a short time after stopping exercise    Last Dr. Meier visit 9-20-21   Last monitor worn 2017.     Patient states that is HR persists at a higher HR and symptoms worsen she will go in ED for evaluation.

## 2022-05-18 ENCOUNTER — HOSPITAL ENCOUNTER (OUTPATIENT)
Dept: CARDIOLOGY | Facility: CLINIC | Age: 62
Discharge: HOME | End: 2022-05-18
Attending: INTERNAL MEDICINE | Admitting: INTERNAL MEDICINE
Payer: COMMERCIAL

## 2022-05-18 ENCOUNTER — TELEPHONE (OUTPATIENT)
Dept: CARDIOLOGY | Facility: CLINIC | Age: 62
End: 2022-05-18

## 2022-05-18 ENCOUNTER — DOCUMENTATION ONLY (OUTPATIENT)
Dept: CARDIOLOGY | Facility: CLINIC | Age: 62
End: 2022-05-18

## 2022-05-18 DIAGNOSIS — I25.10 CORONARY ARTERY DISEASE INVOLVING NATIVE CORONARY ARTERY OF NATIVE HEART WITHOUT ANGINA PECTORIS: Primary | ICD-10-CM

## 2022-05-18 DIAGNOSIS — I25.10 CORONARY ARTERY DISEASE INVOLVING NATIVE CORONARY ARTERY OF NATIVE HEART WITHOUT ANGINA PECTORIS: ICD-10-CM

## 2022-05-18 DIAGNOSIS — R94.39 ABNORMAL NUCLEAR STRESS TEST: Primary | ICD-10-CM

## 2022-05-18 DIAGNOSIS — R94.39 ABNORMAL NUCLEAR STRESS TEST: ICD-10-CM

## 2022-05-18 PROCEDURE — 93350 STRESS TTE ONLY: CPT | Mod: 26 | Performed by: INTERNAL MEDICINE

## 2022-05-18 PROCEDURE — 93325 DOPPLER ECHO COLOR FLOW MAPG: CPT | Mod: TC

## 2022-05-18 PROCEDURE — 93321 DOPPLER ECHO F-UP/LMTD STD: CPT | Mod: 26 | Performed by: INTERNAL MEDICINE

## 2022-05-18 PROCEDURE — 93018 CV STRESS TEST I&R ONLY: CPT | Performed by: INTERNAL MEDICINE

## 2022-05-18 PROCEDURE — 93016 CV STRESS TEST SUPVJ ONLY: CPT | Performed by: INTERNAL MEDICINE

## 2022-05-18 PROCEDURE — 93325 DOPPLER ECHO COLOR FLOW MAPG: CPT | Mod: 26 | Performed by: INTERNAL MEDICINE

## 2022-05-18 PROCEDURE — 255N000002 HC RX 255 OP 636: Performed by: INTERNAL MEDICINE

## 2022-05-18 RX ADMIN — HUMAN ALBUMIN MICROSPHERES AND PERFLUTREN 9 ML: 10; .22 INJECTION, SOLUTION INTRAVENOUS at 13:25

## 2022-05-18 NOTE — TELEPHONE ENCOUNTER
Stress echo resulted as below. Per dictation, referring provider notified of results. Message to Dr Meier for next steps. Testing was ordered for pt reports of increased HR up to 190's w/ fluttering in chest & extremity tingling. No chest pain/dyspnea.     The patient did not exhibit any symptoms during exercise.  This was an abnormal stress EKG. There are 1.5 mm ST segment depression  diffusely with exercise.  This was an abnormal stress echocardiogram with an high risk. There is severe  hypokinesis of the mid-distal anterolateral wall and periapical segments of  the LV and portions of the anteroseptal-anterior wall of the LV as well with  stress. Patient has known LAD stent and diagonal disease.  Referring provider notified of the high risk findings.

## 2022-05-18 NOTE — TELEPHONE ENCOUNTER
Per Dr. Meier's reply - Patient called and abnormal stress test reviewed.  Patient agrees with seeing a MONICA and would like to discuss test in greater detail and then have heart cath order placed at MONICA visit..     - Patient will go into ED if aware of any symptoms, chest pains, shortness of breath, dizziness or light headedness.     Patient agrees with plan.

## 2022-05-20 ENCOUNTER — TELEPHONE (OUTPATIENT)
Dept: CARDIOLOGY | Facility: CLINIC | Age: 62
End: 2022-05-20
Payer: COMMERCIAL

## 2022-05-20 DIAGNOSIS — Z11.59 ENCOUNTER FOR SCREENING FOR OTHER VIRAL DISEASES: Primary | ICD-10-CM

## 2022-05-20 NOTE — TELEPHONE ENCOUNTER
Received overhead page from scheduling with request with the request for the clinic RN to speak to a pt who was transferred from the [facility].     I spoke to pt. She said that she never received a call to set up her MONICA visit after speaking to NORY Lawson on 5/18. Pt called into scheduling this morning and was able to schedule an MONICA visit with Khanh on 5/23 in . Pt frustrated that she was not contacted in a timely manner as she is anxious about her test results and to get the angiogram set up. Pt wanted to know if she can still do her daily 3 mile walks. I told pt that she should rest and avoid any exertional activities as she had an abnormal stress test. Pt advised to go to ED for any prolonged episodes of chest discomfort or shortness of breath that aren't relieved by rest. Pt wanted to know which area of her heart had the abnormalities on her stress echo. I reviewed stress echo results with her. I will route an update to 's nursing team. Elizabeth CUETO May 20, 2022, 12:39 PM

## 2022-05-20 NOTE — TELEPHONE ENCOUNTER
[facility] Call Center    Phone Message    May a detailed message be left on voicemail: yes     Reason for Call: Other: Pt would like a call back as she needs an appt asap for a clearance for a procedure , order is in epic. Please reach out to discuss     Action Taken: Message routed to:  [facility] Cardio    Travel Screening: Not Applicable

## 2022-05-20 NOTE — TELEPHONE ENCOUNTER
Health Call Center    Phone Message    May a detailed message be left on voicemail: yes     Reason for Call: Other: Per pt just got a notification that she needs to do a COVID test before angiogram. States she was positive for COVID within 90 days was told she didn't need COVID test states on 4/11/22 was the date of symptoms. Wants someone to call her today to confirm and clarify as her procedure is next week tue 5/24/22.     Action Taken: Message routed to:  Other: Cardiology     Travel Screening: Not Applicable

## 2022-05-23 ENCOUNTER — OFFICE VISIT (OUTPATIENT)
Dept: CARDIOLOGY | Facility: CLINIC | Age: 62
End: 2022-05-23
Payer: COMMERCIAL

## 2022-05-23 VITALS
OXYGEN SATURATION: 99 % | BODY MASS INDEX: 19.56 KG/M2 | HEART RATE: 64 BPM | WEIGHT: 121.7 LBS | SYSTOLIC BLOOD PRESSURE: 120 MMHG | HEIGHT: 66 IN | DIASTOLIC BLOOD PRESSURE: 60 MMHG

## 2022-05-23 DIAGNOSIS — I25.10 CORONARY ARTERY DISEASE INVOLVING NATIVE CORONARY ARTERY OF NATIVE HEART WITHOUT ANGINA PECTORIS: ICD-10-CM

## 2022-05-23 DIAGNOSIS — R94.39 ABNORMAL NUCLEAR STRESS TEST: ICD-10-CM

## 2022-05-23 PROCEDURE — 93000 ELECTROCARDIOGRAM COMPLETE: CPT | Performed by: NURSE PRACTITIONER

## 2022-05-23 PROCEDURE — 99215 OFFICE O/P EST HI 40 MIN: CPT | Performed by: NURSE PRACTITIONER

## 2022-05-23 RX ORDER — MULTIVIT WITH MINERALS/LUTEIN
1000 TABLET ORAL DAILY
COMMUNITY

## 2022-05-23 NOTE — LETTER
5/23/2022    [facility]  [address]  [facility] [address]    RE: Glenny Butts       Dear Colleague,     I had the pleasure of seeing Glenny Butts in the [facility].    Cardiology Clinic Progress Note    Service Date: May 23, 2022    Primary Cardiologist: Dr. Meier      Reason for Visit: Follow up of stress test results    HPI:   I had the pleasure of meeting Ms. Glenny Butts in the clinic today. She is a very pleasant 61 year old female with a past medical history notable for coronary artery disease, status post non-ST elevation myocardial infarction for which she underwent PCI to the LAD in 2015. She underwent repeat coronary angiography in 2019 for chest discomfort and was found to have a widely patent stent in the LAD without evidence of restenosis.  Ostial narrowing of the second diagonal branch in the 80%-90% range was noted but unchanged compared to her prior angiogram. Mild-to-moderate nonobstructive disease was noted elsewhere and medical therapy was recommended.     The patient called into the clinic last month reporting fluttering sensation in her chest and more prominently elevated heart rates with exercise as well as discomfort in her arm. She also notes feeling a bit more fatigued than usual after her typical activity level. This was reviewed with Dr. Meier who recommend an exercise stress echocardiogram. This was completed last week on 5/18/22 and was abnormal showing 1.5 mm ST segment depression diffusely with exercise as well as severe hypokinesis of the mid-distal anterolateral wall and periapical segments of the LV and portions of the anteroseptal-anterior wall of the LV as well with stress. Findings were reviewed with Dr. Meier who recommended the patient be seen in follow up to discuss proceeding with coronary angiogram and possible PCI. Ms. Butts presents for this today.    She tells me that she has been feeling generally well since completing  the stress test. She was instructed to take it easy and avoid strenuous physical activity or exertion until the angiogram is completed. She denies any symptoms of chest pain, jaw pain, neck pain, back pain, or arm pain. She did note a fairly subtle, mild discomfort near her left shoulder blade had a bandlike pressure sensation in her left arm at times prior to completing the stress test.  She otherwise denies any symptoms of shortness of breath, dyspnea on exertion, orthopnea, PND, or lower extremity edema.  She has not had palpitations, dizziness, presyncope, or syncope.    We reviewed her stress test findings in detail. She had several questions regarding some of the terminology in the report which I answered to the best of my ability today. 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  She also does a lot of yard work and gardening.    ASSESSMENT:  1. Coronary artery disease status post PCI of LAD in 2015  2. Abnormal cardiovascular stress test    PLAN:  - Continue with current cardiac regimen with aspirin 81 mg daily, atorvastatin 40 mg daily, and lisinopril 2.5 mg daily.  - Again advised to take it easy and avoid strenuous physical activity or exercise until the angiogram is completed. We reviewed to call 911 or seek evaluation emergency department in the meantime if she develops any accelerating episodes of chest pain, jaw pain, neck pain, or arm pain with exertion which do not improve at rest.   - Will proceed with the coronary angiogram and possible PCI as planned. Risks and benefits of coronary angiogram were discussed today, including bleeding, bruising, infection, allergic reaction, kidney damage (including need for dialysis), stroke, heart attack, vascular damage, emergency open heart surgery, up to and including death. Patient indicates understanding and is agreeable to proceed.      Contrast allergy:  No  Anticoagulation: No   Metformin: No  Oral DM meds: No  Insulin: No  Diuretic: No  Use of phosphodiesterase type 5 inhibitor: No  Aspirin: Currently on 81 mg daily   Pt informed to be NPO at midnight  Renal issues: No  Pt has transportation and 24 hours post procedure monitoring set up.   Pt aware of no driving for 24 hours post procedure.   Code status: Currently full code    Thank you for the opportunity to participate in this pleasant patient's care. We will see her back in follow up in 1-2 weeks after the angiogram. I offered to switch her to my schedule in [place] for continuity of care, but she would prefer to be seen at the [facility] which is closer to her home. I do not have any availability there within the 1-2 week timeframe post procedure, so she would prefer to keep her currently scheduled appointment as planned with my colleague, Adelina Maya PA-C on 6/15/22. We would be happy to see her sooner if needed for any concerns in the meantime.     45 total minutes was spent today including chart review, precharting, history and exam, post visit documentation, and reviewing studies as outlined above.     COLE Perkins, CNP   Nurse Practitioner  [facility]  Pager: [phone]  Text Page  (8am - 5pm, M-F)    Orders this Visit:  Orders Placed This Encounter   Procedures     EKG 12-lead complete w/read - Clinics (performed today)     Orders Placed This Encounter   Medications     Calcium-Magnesium-Vitamin D (CALCIUM 1200+D3 PO)     Sig: Take 1 tablet by mouth daily 1,000 units D3     vitamin C (ASCORBIC ACID) 1000 MG TABS     Sig: Take 1,000 mg by mouth daily     Cyanocobalamin (VITAMIN B-12) 5000 MCG LOZG     Sig: Take 5,000 mcg by mouth daily     Vitamin D3 (CHOLECALCIFEROL) 125 MCG (5000 UT) tablet     Sig: Take 1 tablet by mouth daily     Medications Discontinued During This Encounter   Medication Reason     calcium citrate (CITRACAL) 950 MG tablet Medication Reconciliation  Clean Up     Vitamin D, Cholecalciferol, 400 units TABS Medication Reconciliation Clean Up     Encounter Diagnoses   Name Primary?     Coronary artery disease involving native coronary artery of native heart without angina pectoris      Abnormal nuclear stress test        CURRENT MEDICATIONS:  Current Outpatient Medications   Medication Sig Dispense Refill     acetaminophen (TYLENOL) 325 MG tablet Take 325-650 mg by mouth every 6 hours as needed for mild pain       ASPIRIN PO Take 81 mg by mouth daily       atorvastatin (LIPITOR) 40 MG tablet Take 1 tablet (40 mg) by mouth daily 90 tablet 3     Calcium-Magnesium-Vitamin D (CALCIUM 1200+D3 PO) Take 1 tablet by mouth daily 1,000 units D3       Cyanocobalamin (VITAMIN B-12) 5000 MCG LOZG Take 5,000 mcg by mouth daily       DiphenhydrAMINE HCl (BENADRYL PO) Take 25 mg by mouth as needed Reported on 2/17/2017       doxylamine (UNISOM) 25 MG TABS Take 12.5 mg by mouth nightly as needed        lisinopril (ZESTRIL) 2.5 MG tablet Take 1 tablet (2.5 mg) by mouth daily 90 tablet 3     magnesium oxide 400 MG CAPS        melatonin 5 MG CAPS Take by mouth daily       nitroGLYcerin (NITROSTAT) 0.4 MG sublingual tablet Place 1 tablet (0.4 mg) under the tongue every 5 minutes as needed for chest pain 25 tablet 1     pantoprazole (PROTONIX) 40 MG EC tablet Take 1 tablet (40 mg) by mouth daily Take 30-60 minutes before a meal. 30 tablet 0     vitamin C (ASCORBIC ACID) 1000 MG TABS Take 1,000 mg by mouth daily       Vitamin D3 (CHOLECALCIFEROL) 125 MCG (5000 UT) tablet Take 1 tablet by mouth daily       zinc gluconate 30 MG TABS Take 50 mg by mouth daily       ALLERGIES  Allergies   Allergen Reactions     Dust Mites      Grass      Trees        PAST MEDICAL, SURGICAL, FAMILY HISTORY:  History was reviewed and updated as needed, see medical record.    SOCIAL HISTORY:  Social History     Socioeconomic History     Marital status:      Spouse name: Not on file     Number of  "children: Not on file     Years of education: Not on file     Highest education level: Not on file   Occupational History     Not on file   Tobacco Use     Smoking status: Never Smoker     Smokeless tobacco: Never Used   Substance and Sexual Activity     Alcohol use: Yes     Comment: 4 drinks per month     Drug use: No     Sexual activity: Not on file   Other Topics Concern      Service Not Asked     Blood Transfusions Not Asked     Caffeine Concern No     Comment: cup of tea, no coffee     Occupational Exposure Not Asked     Hobby Hazards Not Asked     Sleep Concern Yes     Comment: takes OTC med     Stress Concern No     Weight Concern No     Special Diet Yes     Comment: LOW FAT, low sugar     Back Care Not Asked     Exercise Yes     Comment:  4 times week, brisk walking treadmill, elyptical     Bike Helmet Not Asked     Seat Belt Yes     Self-Exams Not Asked     Parent/sibling w/ CABG, MI or angioplasty before 65F 55M? Yes   Social History Narrative     Not on file     Social Determinants of Health     Financial Resource Strain: Not on file   Food Insecurity: Not on file   Transportation Needs: Not on file   Physical Activity: Not on file   Stress: Not on file   Social Connections: Not on file   Intimate Partner Violence: Not on file   Housing Stability: Not on file     Review of Systems:  Skin:  not assessed     Eyes:  not assessed    ENT:  not assessed    Respiratory:  Negative    Cardiovascular:    Positive for;palpitations  Gastroenterology: not assessed    Genitourinary:  not assessed    Musculoskeletal:  not assessed    Neurologic:  not assessed    Psychiatric:  not assessed    Heme/Lymph/Imm:  not assessed    Endocrine:  not assessed       Physical Exam:  Vitals: BP (!) 142/72 (BP Location: Right arm, Patient Position: Sitting, Cuff Size: Adult Regular)   Pulse 64   Ht 1.676 m (5' 6\")   Wt 55.2 kg (121 lb 11.2 oz)   LMP 08/20/2014 (Exact Date)   SpO2 99%   BMI 19.64 kg/m     Wt Readings from " Last 4 Encounters:   05/23/22 55.2 kg (121 lb 11.2 oz)   09/20/21 54.9 kg (121 lb)   09/18/20 55.3 kg (122 lb)   09/16/19 55.4 kg (122 lb 3.2 oz)     CONSTITUTIONAL: Appears her stated age, well nourished, and in no acute distress.  HEENT: Pupils equal, round. Sclerae nonicteric.    NECK: Supple, no masses or JVD appreciated.  C/V:  Regular rate and rhythm, normal S1 and S2, no S3 or S4, no murmur, rub or gallop.    RESP: Respirations are unlabored. Lungs are clear to auscultation bilaterally without wheezing, rales, or rhonchi.  EXTREM: No clubbing, cyanosis, or lower extremity edema bilaterally.   NEURO: Alert and oriented, cooperative. Gait steady. No gross focal deficits.   PSYCH: Affect appropriate. Mentation normal. Responds to questions appropriately.  SKIN: Warm and dry. No apparent rashes or bruising.     Recent Lab Results:  LIPID RESULTS:  Lab Results   Component Value Date    CHOL 141 09/20/2021    CHOL 151 07/22/2020    HDL 88 09/20/2021    HDL 79 07/22/2020    LDL 43 09/20/2021    LDL 63 07/22/2020    TRIG 48 09/20/2021    TRIG 46 07/22/2020    CHOLHDLRATIO 1.8 10/23/2015     LIVER ENZYME RESULTS:  Lab Results   Component Value Date    AST 32 09/20/2021    AST 30 07/22/2020    ALT 33 09/20/2021    ALT 8 09/16/2019     CBC RESULTS:  Lab Results   Component Value Date    WBC 4.1 10/08/2018    RBC 4.46 10/08/2018    HGB 13.5 10/08/2018    HCT 41.2 10/08/2018    MCV 92 10/08/2018    MCH 30.3 10/08/2018    MCHC 32.8 10/08/2018    RDW 12.1 10/08/2018     10/08/2018     BMP RESULTS:  Lab Results   Component Value Date     09/20/2021     09/16/2020    POTASSIUM 4.2 09/20/2021    POTASSIUM 4.4 09/16/2020    CHLORIDE 106 09/20/2021    CHLORIDE 102 09/16/2020    CO2 28 09/20/2021    CO2 29 09/16/2020    ANIONGAP 5 09/20/2021    ANIONGAP 9.4 09/16/2020    GLC 88 09/20/2021     (H) 09/16/2020    BUN 21 09/20/2021    BUN 21 09/16/2020    CR 0.80 09/20/2021    CR 0.79 09/16/2020     GFRESTIMATED 79 09/20/2021    GFRESTIMATED 74 09/16/2020    GFRESTBLACK 89 09/16/2020    PARKER 9.3 09/20/2021    PARKER 9.6 09/16/2020      A1C RESULTS:  No results found for: A1C  INR RESULTS:  Lab Results   Component Value Date    INR 1.01 10/08/2018    INR 0.95 10/05/2015       CC  Rdaha Meier MD  [address]  [place] [id number]    This note was completed in part using Dragon voice recognition software. Although reviewed after completion, some word and grammatical errors may occur.    Thank you for allowing me to participate in the care of your patient.      Sincerely,     Sundar Alexandra NP     [facility]  cc:   Radha Meier MD  [address]  [place] [id number]

## 2022-05-23 NOTE — PROGRESS NOTES
Cardiology Clinic Progress Note    Service Date: May 23, 2022    Primary Cardiologist: Dr. Meier      Reason for Visit: Follow up of stress test results    HPI:   I had the pleasure of meeting Ms. Glenny Butts in the clinic today. She is a very pleasant 61 year old female with a past medical history notable for coronary artery disease, status post non-ST elevation myocardial infarction for which she underwent PCI to the LAD in 2015. She underwent repeat coronary angiography in 2019 for chest discomfort and was found to have a widely patent stent in the LAD without evidence of restenosis.  Ostial narrowing of the second diagonal branch in the 80%-90% range was noted but unchanged compared to her prior angiogram. Mild-to-moderate nonobstructive disease was noted elsewhere and medical therapy was recommended.     The patient called into the clinic last month reporting fluttering sensation in her chest and more prominently elevated heart rates with exercise as well as discomfort in her arm. She also notes feeling a bit more fatigued than usual after her typical activity level. This was reviewed with Dr. Meier who recommend an exercise stress echocardiogram. This was completed last week on 5/18/22 and was abnormal showing 1.5 mm ST segment depression diffusely with exercise as well as severe hypokinesis of the mid-distal anterolateral wall and periapical segments of the LV and portions of the anteroseptal-anterior wall of the LV as well with stress. Findings were reviewed with Dr. Meier who recommended the patient be seen in follow up to discuss proceeding with coronary angiogram and possible PCI. Ms. Butts presents for this today.    She tells me that she has been feeling generally well since completing the stress test. She was instructed to take it easy and avoid strenuous physical activity or exertion until the angiogram is completed. She denies any symptoms of chest pain, jaw pain, neck pain, back pain, or arm  pain. She did note a fairly subtle, mild discomfort near her left shoulder blade had a bandlike pressure sensation in her left arm at times prior to completing the stress test.  She otherwise denies any symptoms of shortness of breath, dyspnea on exertion, orthopnea, PND, or lower extremity edema.  She has not had palpitations, dizziness, presyncope, or syncope.    We reviewed her stress test findings in detail. She had several questions regarding some of the terminology in the report which I answered to the best of my ability today. 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  She also does a lot of yard work and gardening.    ASSESSMENT:  1. Coronary artery disease status post PCI of LAD in 2015  2. Abnormal cardiovascular stress test    PLAN:  - Continue with current cardiac regimen with aspirin 81 mg daily, atorvastatin 40 mg daily, and lisinopril 2.5 mg daily.  - Again advised to take it easy and avoid strenuous physical activity or exercise until the angiogram is completed. We reviewed to call 911 or seek evaluation emergency department in the meantime if she develops any accelerating episodes of chest pain, jaw pain, neck pain, or arm pain with exertion which do not improve at rest.   - Will proceed with the coronary angiogram and possible PCI as planned. Risks and benefits of coronary angiogram were discussed today, including bleeding, bruising, infection, allergic reaction, kidney damage (including need for dialysis), stroke, heart attack, vascular damage, emergency open heart surgery, up to and including death. Patient indicates understanding and is agreeable to proceed.      Contrast allergy: No  Anticoagulation: No   Metformin: No  Oral DM meds: No  Insulin: No  Diuretic: No  Use of phosphodiesterase type 5 inhibitor: No  Aspirin: Currently on 81 mg daily   Pt informed to be NPO at midnight  Renal issues: No  Pt  has transportation and 24 hours post procedure monitoring set up.   Pt aware of no driving for 24 hours post procedure.   Code status: Currently full code    Thank you for the opportunity to participate in this pleasant patient's care. We will see her back in follow up in 1-2 weeks after the angiogram. I offered to switch her to my schedule in [place] for continuity of care, but she would prefer to be seen at the [facility] which is closer to her home. I do not have any availability there within the 1-2 week timeframe post procedure, so she would prefer to keep her currently scheduled appointment as planned with my colleague, Adelina Maya PA-C on 6/15/22. We would be happy to see her sooner if needed for any concerns in the meantime.     45 total minutes was spent today including chart review, precharting, history and exam, post visit documentation, and reviewing studies as outlined above.     COLE Perkins, CNP   Nurse Practitioner  [facility]  Pager: [phone]  Text Page  (8am - 5pm, M-F)    Orders this Visit:  Orders Placed This Encounter   Procedures     EKG 12-lead complete w/read - Clinics (performed today)     Orders Placed This Encounter   Medications     Calcium-Magnesium-Vitamin D (CALCIUM 1200+D3 PO)     Sig: Take 1 tablet by mouth daily 1,000 units D3     vitamin C (ASCORBIC ACID) 1000 MG TABS     Sig: Take 1,000 mg by mouth daily     Cyanocobalamin (VITAMIN B-12) 5000 MCG LOZG     Sig: Take 5,000 mcg by mouth daily     Vitamin D3 (CHOLECALCIFEROL) 125 MCG (5000 UT) tablet     Sig: Take 1 tablet by mouth daily     Medications Discontinued During This Encounter   Medication Reason     calcium citrate (CITRACAL) 950 MG tablet Medication Reconciliation Clean Up     Vitamin D, Cholecalciferol, 400 units TABS Medication Reconciliation Clean Up     Encounter Diagnoses   Name Primary?     Coronary artery disease involving native coronary artery of native heart without angina  pectoris      Abnormal nuclear stress test        CURRENT MEDICATIONS:  Current Outpatient Medications   Medication Sig Dispense Refill     acetaminophen (TYLENOL) 325 MG tablet Take 325-650 mg by mouth every 6 hours as needed for mild pain       ASPIRIN PO Take 81 mg by mouth daily       atorvastatin (LIPITOR) 40 MG tablet Take 1 tablet (40 mg) by mouth daily 90 tablet 3     Calcium-Magnesium-Vitamin D (CALCIUM 1200+D3 PO) Take 1 tablet by mouth daily 1,000 units D3       Cyanocobalamin (VITAMIN B-12) 5000 MCG LOZG Take 5,000 mcg by mouth daily       DiphenhydrAMINE HCl (BENADRYL PO) Take 25 mg by mouth as needed Reported on 2/17/2017       doxylamine (UNISOM) 25 MG TABS Take 12.5 mg by mouth nightly as needed        lisinopril (ZESTRIL) 2.5 MG tablet Take 1 tablet (2.5 mg) by mouth daily 90 tablet 3     magnesium oxide 400 MG CAPS        melatonin 5 MG CAPS Take by mouth daily       nitroGLYcerin (NITROSTAT) 0.4 MG sublingual tablet Place 1 tablet (0.4 mg) under the tongue every 5 minutes as needed for chest pain 25 tablet 1     pantoprazole (PROTONIX) 40 MG EC tablet Take 1 tablet (40 mg) by mouth daily Take 30-60 minutes before a meal. 30 tablet 0     vitamin C (ASCORBIC ACID) 1000 MG TABS Take 1,000 mg by mouth daily       Vitamin D3 (CHOLECALCIFEROL) 125 MCG (5000 UT) tablet Take 1 tablet by mouth daily       zinc gluconate 30 MG TABS Take 50 mg by mouth daily       ALLERGIES  Allergies   Allergen Reactions     Dust Mites      Grass      Trees        PAST MEDICAL, SURGICAL, FAMILY HISTORY:  History was reviewed and updated as needed, see medical record.    SOCIAL HISTORY:  Social History     Socioeconomic History     Marital status:      Spouse name: Not on file     Number of children: Not on file     Years of education: Not on file     Highest education level: Not on file   Occupational History     Not on file   Tobacco Use     Smoking status: Never Smoker     Smokeless tobacco: Never Used   Substance  "and Sexual Activity     Alcohol use: Yes     Comment: 4 drinks per month     Drug use: No     Sexual activity: Not on file   Other Topics Concern      Service Not Asked     Blood Transfusions Not Asked     Caffeine Concern No     Comment: cup of tea, no coffee     Occupational Exposure Not Asked     Hobby Hazards Not Asked     Sleep Concern Yes     Comment: takes OTC med     Stress Concern No     Weight Concern No     Special Diet Yes     Comment: LOW FAT, low sugar     Back Care Not Asked     Exercise Yes     Comment:  4 times week, brisk walking treadmill, elyptical     Bike Helmet Not Asked     Seat Belt Yes     Self-Exams Not Asked     Parent/sibling w/ CABG, MI or angioplasty before 65F 55M? Yes   Social History Narrative     Not on file     Social Determinants of Health     Financial Resource Strain: Not on file   Food Insecurity: Not on file   Transportation Needs: Not on file   Physical Activity: Not on file   Stress: Not on file   Social Connections: Not on file   Intimate Partner Violence: Not on file   Housing Stability: Not on file     Review of Systems:  Skin:  not assessed     Eyes:  not assessed    ENT:  not assessed    Respiratory:  Negative    Cardiovascular:    Positive for;palpitations  Gastroenterology: not assessed    Genitourinary:  not assessed    Musculoskeletal:  not assessed    Neurologic:  not assessed    Psychiatric:  not assessed    Heme/Lymph/Imm:  not assessed    Endocrine:  not assessed       Physical Exam:  Vitals: BP (!) 142/72 (BP Location: Right arm, Patient Position: Sitting, Cuff Size: Adult Regular)   Pulse 64   Ht 1.676 m (5' 6\")   Wt 55.2 kg (121 lb 11.2 oz)   LMP 08/20/2014 (Exact Date)   SpO2 99%   BMI 19.64 kg/m     Wt Readings from Last 4 Encounters:   05/23/22 55.2 kg (121 lb 11.2 oz)   09/20/21 54.9 kg (121 lb)   09/18/20 55.3 kg (122 lb)   09/16/19 55.4 kg (122 lb 3.2 oz)     CONSTITUTIONAL: Appears her stated age, well nourished, and in no acute " distress.  HEENT: Pupils equal, round. Sclerae nonicteric.    NECK: Supple, no masses or JVD appreciated.  C/V:  Regular rate and rhythm, normal S1 and S2, no S3 or S4, no murmur, rub or gallop.    RESP: Respirations are unlabored. Lungs are clear to auscultation bilaterally without wheezing, rales, or rhonchi.  EXTREM: No clubbing, cyanosis, or lower extremity edema bilaterally.   NEURO: Alert and oriented, cooperative. Gait steady. No gross focal deficits.   PSYCH: Affect appropriate. Mentation normal. Responds to questions appropriately.  SKIN: Warm and dry. No apparent rashes or bruising.     Recent Lab Results:  LIPID RESULTS:  Lab Results   Component Value Date    CHOL 141 09/20/2021    CHOL 151 07/22/2020    HDL 88 09/20/2021    HDL 79 07/22/2020    LDL 43 09/20/2021    LDL 63 07/22/2020    TRIG 48 09/20/2021    TRIG 46 07/22/2020    CHOLHDLRATIO 1.8 10/23/2015     LIVER ENZYME RESULTS:  Lab Results   Component Value Date    AST 32 09/20/2021    AST 30 07/22/2020    ALT 33 09/20/2021    ALT 8 09/16/2019     CBC RESULTS:  Lab Results   Component Value Date    WBC 4.1 10/08/2018    RBC 4.46 10/08/2018    HGB 13.5 10/08/2018    HCT 41.2 10/08/2018    MCV 92 10/08/2018    MCH 30.3 10/08/2018    MCHC 32.8 10/08/2018    RDW 12.1 10/08/2018     10/08/2018     BMP RESULTS:  Lab Results   Component Value Date     09/20/2021     09/16/2020    POTASSIUM 4.2 09/20/2021    POTASSIUM 4.4 09/16/2020    CHLORIDE 106 09/20/2021    CHLORIDE 102 09/16/2020    CO2 28 09/20/2021    CO2 29 09/16/2020    ANIONGAP 5 09/20/2021    ANIONGAP 9.4 09/16/2020    GLC 88 09/20/2021     (H) 09/16/2020    BUN 21 09/20/2021    BUN 21 09/16/2020    CR 0.80 09/20/2021    CR 0.79 09/16/2020    GFRESTIMATED 79 09/20/2021    GFRESTIMATED 74 09/16/2020    GFRESTBLACK 89 09/16/2020    PARKER 9.3 09/20/2021    PARKER 9.6 09/16/2020      A1C RESULTS:  No results found for: A1C  INR RESULTS:  Lab Results   Component Value Date    INR  1.01 10/08/2018    INR 0.95 10/05/2015       CC  Radha Meier MD  [id number]  [place] [id number]    This note was completed in part using Dragon voice recognition software. Although reviewed after completion, some word and grammatical errors may occur.

## 2022-05-23 NOTE — PATIENT INSTRUCTIONS
Thank you for your visit with the [facility] today.    Today's plan:   Continue with your current medications.  Complete the coronary angiogram as planned.   Follow up in 1-2 weeks after the procedure.     If you have questions or concerns, please do not hesitate to call my nurse team at [phone].     Scheduling phone number: [phone]    It was a pleasure seeing you today!     COLE Perkins, CNP  Nurse Practitioner  [facility]  May 23, 2022  ________________________________________________________

## 2022-05-25 ENCOUNTER — TELEPHONE (OUTPATIENT)
Dept: CARDIOLOGY | Facility: CLINIC | Age: 62
End: 2022-05-25
Payer: COMMERCIAL

## 2022-05-25 DIAGNOSIS — I25.10 CORONARY ARTERY DISEASE INVOLVING NATIVE CORONARY ARTERY OF NATIVE HEART WITHOUT ANGINA PECTORIS: ICD-10-CM

## 2022-05-25 DIAGNOSIS — R94.39 ABNORMAL NUCLEAR STRESS TEST: Primary | ICD-10-CM

## 2022-05-25 RX ORDER — SODIUM CHLORIDE 9 MG/ML
INJECTION, SOLUTION INTRAVENOUS CONTINUOUS
Status: CANCELLED | OUTPATIENT
Start: 2022-05-25

## 2022-05-25 RX ORDER — ASPIRIN 325 MG
325 TABLET ORAL ONCE
Status: CANCELLED | OUTPATIENT
Start: 2022-05-25 | End: 2022-05-25

## 2022-05-25 RX ORDER — POTASSIUM CHLORIDE 1500 MG/1
20 TABLET, EXTENDED RELEASE ORAL
Status: CANCELLED | OUTPATIENT
Start: 2022-05-25

## 2022-05-25 RX ORDER — LIDOCAINE 40 MG/G
CREAM TOPICAL
Status: CANCELLED | OUTPATIENT
Start: 2022-05-25

## 2022-05-25 RX ORDER — ASPIRIN 81 MG/1
243 TABLET, CHEWABLE ORAL ONCE
Status: CANCELLED | OUTPATIENT
Start: 2022-05-25

## 2022-05-25 NOTE — TELEPHONE ENCOUNTER
Message left for [employer] to review angiogram for insurance coverage.    1020 attempted to contact patient to review prep for an  giogram on 5/31/2022. Left message for patient to call back.    12:00 Contacted patient to review pre-cath procedures: patient is scheduled for coronary angiogram (left)  on 5/31/2022.   Patient's arrival time is 0630 and procedure time is 0830 or 1st case. Reviewed that these times are not precise due to unplanned emergent needs of other patients.  Patient is aware to be NPO except for medications.   Patient has arranged for transportation and 24 hour f/u care.   Patient has no known contract dye allergy.   Patient is not diabetic  Patient is taking the anti-coagulation medication.   Patient's renal function is WNL for procedure.   Patient will continue daily aspirin dose 81mg with 3 additional doses the morning of the procedure.   Patient reminded to check temperature the morning of procedure; if T>100 then call [facility] @ [phone] for instructions.  Order for procedure entered.    COVID-19 test is scheduled for 5/27/2022.

## 2022-05-27 ENCOUNTER — LAB (OUTPATIENT)
Dept: URGENT CARE | Facility: URGENT CARE | Age: 62
End: 2022-05-27
Attending: INTERNAL MEDICINE
Payer: COMMERCIAL

## 2022-05-27 DIAGNOSIS — Z11.59 ENCOUNTER FOR SCREENING FOR OTHER VIRAL DISEASES: ICD-10-CM

## 2022-05-27 PROCEDURE — U0005 INFEC AGEN DETEC AMPLI PROBE: HCPCS

## 2022-05-27 PROCEDURE — U0003 INFECTIOUS AGENT DETECTION BY NUCLEIC ACID (DNA OR RNA); SEVERE ACUTE RESPIRATORY SYNDROME CORONAVIRUS 2 (SARS-COV-2) (CORONAVIRUS DISEASE [COVID-19]), AMPLIFIED PROBE TECHNIQUE, MAKING USE OF HIGH THROUGHPUT TECHNOLOGIES AS DESCRIBED BY CMS-2020-01-R: HCPCS

## 2022-05-28 LAB — SARS-COV-2 RNA RESP QL NAA+PROBE: NEGATIVE

## 2022-05-31 ENCOUNTER — HOSPITAL ENCOUNTER (OUTPATIENT)
Facility: CLINIC | Age: 62
Discharge: HOME | End: 2022-05-31
Admitting: INTERNAL MEDICINE
Payer: COMMERCIAL

## 2022-05-31 VITALS
HEIGHT: 66 IN | BODY MASS INDEX: 18.96 KG/M2 | DIASTOLIC BLOOD PRESSURE: 63 MMHG | SYSTOLIC BLOOD PRESSURE: 98 MMHG | TEMPERATURE: 97.6 F | WEIGHT: 118 LBS | OXYGEN SATURATION: 99 % | RESPIRATION RATE: 22 BRPM | HEART RATE: 46 BPM

## 2022-05-31 DIAGNOSIS — R94.39 ABNORMAL NUCLEAR STRESS TEST: ICD-10-CM

## 2022-05-31 DIAGNOSIS — I25.10 CORONARY ARTERY DISEASE INVOLVING NATIVE CORONARY ARTERY OF NATIVE HEART WITHOUT ANGINA PECTORIS: ICD-10-CM

## 2022-05-31 PROBLEM — Z98.890 STATUS POST CORONARY ANGIOGRAM: Status: ACTIVE | Noted: 2022-05-31

## 2022-05-31 LAB
ANION GAP SERPL CALCULATED.3IONS-SCNC: 5 MMOL/L (ref 3–14)
APTT PPP: 24 SECONDS (ref 22–38)
BUN SERPL-MCNC: 25 MG/DL (ref 7–30)
CALCIUM SERPL-MCNC: 9.5 MG/DL (ref 8.5–10.1)
CHLORIDE BLD-SCNC: 103 MMOL/L (ref 94–109)
CO2 SERPL-SCNC: 31 MMOL/L (ref 20–32)
CREAT SERPL-MCNC: 0.82 MG/DL (ref 0.52–1.04)
ERYTHROCYTE [DISTWIDTH] IN BLOOD BY AUTOMATED COUNT: 12.3 % (ref 10–15)
GFR SERPL CREATININE-BSD FRML MDRD: 80 ML/MIN/1.73M2
GLUCOSE BLD-MCNC: 94 MG/DL (ref 70–99)
HCT VFR BLD AUTO: 43.2 % (ref 35–47)
HGB BLD-MCNC: 14.3 G/DL (ref 11.7–15.7)
INR PPP: 1.01 (ref 0.85–1.15)
MCH RBC QN AUTO: 30.4 PG (ref 26.5–33)
MCHC RBC AUTO-ENTMCNC: 33.1 G/DL (ref 31.5–36.5)
MCV RBC AUTO: 92 FL (ref 78–100)
PLATELET # BLD AUTO: 167 10E3/UL (ref 150–450)
POTASSIUM BLD-SCNC: 3.7 MMOL/L (ref 3.4–5.3)
RBC # BLD AUTO: 4.7 10E6/UL (ref 3.8–5.2)
SODIUM SERPL-SCNC: 139 MMOL/L (ref 133–144)
WBC # BLD AUTO: 4.4 10E3/UL (ref 4–11)

## 2022-05-31 PROCEDURE — 99152 MOD SED SAME PHYS/QHP 5/>YRS: CPT | Performed by: INTERNAL MEDICINE

## 2022-05-31 PROCEDURE — 85730 THROMBOPLASTIN TIME PARTIAL: CPT | Performed by: INTERNAL MEDICINE

## 2022-05-31 PROCEDURE — 93454 CORONARY ARTERY ANGIO S&I: CPT | Performed by: INTERNAL MEDICINE

## 2022-05-31 PROCEDURE — 85610 PROTHROMBIN TIME: CPT | Performed by: INTERNAL MEDICINE

## 2022-05-31 PROCEDURE — 93005 ELECTROCARDIOGRAM TRACING: CPT

## 2022-05-31 PROCEDURE — 999N000054 HC STATISTIC EKG NON-CHARGEABLE

## 2022-05-31 PROCEDURE — 99152 MOD SED SAME PHYS/QHP 5/>YRS: CPT | Mod: GC | Performed by: INTERNAL MEDICINE

## 2022-05-31 PROCEDURE — 36591 DRAW BLOOD OFF VENOUS DEVICE: CPT

## 2022-05-31 PROCEDURE — 250N000009 HC RX 250: Performed by: INTERNAL MEDICINE

## 2022-05-31 PROCEDURE — 250N000011 HC RX IP 250 OP 636: Performed by: INTERNAL MEDICINE

## 2022-05-31 PROCEDURE — 258N000003 HC RX IP 258 OP 636: Performed by: INTERNAL MEDICINE

## 2022-05-31 PROCEDURE — 272N000001 HC OR GENERAL SUPPLY STERILE: Performed by: INTERNAL MEDICINE

## 2022-05-31 PROCEDURE — 85014 HEMATOCRIT: CPT | Performed by: INTERNAL MEDICINE

## 2022-05-31 PROCEDURE — 999N000184 HC STATISTIC TELEMETRY

## 2022-05-31 PROCEDURE — C1769 GUIDE WIRE: HCPCS | Performed by: INTERNAL MEDICINE

## 2022-05-31 PROCEDURE — 36415 COLL VENOUS BLD VENIPUNCTURE: CPT | Performed by: INTERNAL MEDICINE

## 2022-05-31 PROCEDURE — C1894 INTRO/SHEATH, NON-LASER: HCPCS | Performed by: INTERNAL MEDICINE

## 2022-05-31 PROCEDURE — 93454 CORONARY ARTERY ANGIO S&I: CPT | Mod: 26 | Performed by: INTERNAL MEDICINE

## 2022-05-31 PROCEDURE — 80048 BASIC METABOLIC PNL TOTAL CA: CPT | Performed by: INTERNAL MEDICINE

## 2022-05-31 PROCEDURE — 999N000071 HC STATISTIC HEART CATH LAB OR EP LAB

## 2022-05-31 PROCEDURE — 93010 ELECTROCARDIOGRAM REPORT: CPT | Performed by: INTERNAL MEDICINE

## 2022-05-31 PROCEDURE — 250N000013 HC RX MED GY IP 250 OP 250 PS 637: Performed by: INTERNAL MEDICINE

## 2022-05-31 RX ORDER — NALOXONE HYDROCHLORIDE 0.4 MG/ML
0.2 INJECTION, SOLUTION INTRAMUSCULAR; INTRAVENOUS; SUBCUTANEOUS
Status: DISCONTINUED | OUTPATIENT
Start: 2022-05-31 | End: 2022-05-31 | Stop reason: HOSPADM

## 2022-05-31 RX ORDER — FENTANYL CITRATE 50 UG/ML
25 INJECTION, SOLUTION INTRAMUSCULAR; INTRAVENOUS
Status: DISCONTINUED | OUTPATIENT
Start: 2022-05-31 | End: 2022-05-31 | Stop reason: HOSPADM

## 2022-05-31 RX ORDER — NALOXONE HYDROCHLORIDE 0.4 MG/ML
0.4 INJECTION, SOLUTION INTRAMUSCULAR; INTRAVENOUS; SUBCUTANEOUS
Status: DISCONTINUED | OUTPATIENT
Start: 2022-05-31 | End: 2022-05-31 | Stop reason: HOSPADM

## 2022-05-31 RX ORDER — ACETAMINOPHEN 325 MG/1
650 TABLET ORAL EVERY 4 HOURS PRN
Status: DISCONTINUED | OUTPATIENT
Start: 2022-05-31 | End: 2022-05-31 | Stop reason: HOSPADM

## 2022-05-31 RX ORDER — HEPARIN SODIUM 1000 [USP'U]/ML
INJECTION, SOLUTION INTRAVENOUS; SUBCUTANEOUS
Status: DISCONTINUED | OUTPATIENT
Start: 2022-05-31 | End: 2022-05-31 | Stop reason: HOSPADM

## 2022-05-31 RX ORDER — ASPIRIN 325 MG
325 TABLET ORAL ONCE
Status: DISCONTINUED | OUTPATIENT
Start: 2022-05-31 | End: 2022-05-31 | Stop reason: HOSPADM

## 2022-05-31 RX ORDER — OXYCODONE HYDROCHLORIDE 5 MG/1
5 TABLET ORAL EVERY 4 HOURS PRN
Status: DISCONTINUED | OUTPATIENT
Start: 2022-05-31 | End: 2022-05-31 | Stop reason: HOSPADM

## 2022-05-31 RX ORDER — POTASSIUM CHLORIDE 1500 MG/1
20 TABLET, EXTENDED RELEASE ORAL
Status: COMPLETED | OUTPATIENT
Start: 2022-05-31 | End: 2022-05-31

## 2022-05-31 RX ORDER — ATROPINE SULFATE 0.1 MG/ML
0.5 INJECTION INTRAVENOUS
Status: DISCONTINUED | OUTPATIENT
Start: 2022-05-31 | End: 2022-05-31 | Stop reason: HOSPADM

## 2022-05-31 RX ORDER — VERAPAMIL HYDROCHLORIDE 2.5 MG/ML
INJECTION, SOLUTION INTRAVENOUS
Status: DISCONTINUED | OUTPATIENT
Start: 2022-05-31 | End: 2022-05-31 | Stop reason: HOSPADM

## 2022-05-31 RX ORDER — FENTANYL CITRATE 50 UG/ML
INJECTION, SOLUTION INTRAMUSCULAR; INTRAVENOUS
Status: DISCONTINUED | OUTPATIENT
Start: 2022-05-31 | End: 2022-05-31 | Stop reason: HOSPADM

## 2022-05-31 RX ORDER — OXYCODONE HYDROCHLORIDE 5 MG/1
10 TABLET ORAL EVERY 4 HOURS PRN
Status: DISCONTINUED | OUTPATIENT
Start: 2022-05-31 | End: 2022-05-31 | Stop reason: HOSPADM

## 2022-05-31 RX ORDER — NITROGLYCERIN 5 MG/ML
VIAL (ML) INTRAVENOUS
Status: DISCONTINUED | OUTPATIENT
Start: 2022-05-31 | End: 2022-05-31 | Stop reason: HOSPADM

## 2022-05-31 RX ORDER — SODIUM CHLORIDE 9 MG/ML
INJECTION, SOLUTION INTRAVENOUS CONTINUOUS
Status: DISCONTINUED | OUTPATIENT
Start: 2022-05-31 | End: 2022-05-31 | Stop reason: HOSPADM

## 2022-05-31 RX ORDER — ASPIRIN 81 MG/1
243 TABLET, CHEWABLE ORAL ONCE
Status: DISCONTINUED | OUTPATIENT
Start: 2022-05-31 | End: 2022-05-31 | Stop reason: HOSPADM

## 2022-05-31 RX ORDER — IOPAMIDOL 755 MG/ML
INJECTION, SOLUTION INTRAVASCULAR
Status: DISCONTINUED | OUTPATIENT
Start: 2022-05-31 | End: 2022-05-31 | Stop reason: HOSPADM

## 2022-05-31 RX ORDER — LIDOCAINE 40 MG/G
CREAM TOPICAL
Status: DISCONTINUED | OUTPATIENT
Start: 2022-05-31 | End: 2022-05-31 | Stop reason: HOSPADM

## 2022-05-31 RX ORDER — FLUMAZENIL 0.1 MG/ML
0.2 INJECTION, SOLUTION INTRAVENOUS
Status: DISCONTINUED | OUTPATIENT
Start: 2022-05-31 | End: 2022-05-31 | Stop reason: HOSPADM

## 2022-05-31 RX ADMIN — POTASSIUM CHLORIDE 20 MEQ: 1500 TABLET, EXTENDED RELEASE ORAL at 07:50

## 2022-05-31 RX ADMIN — SODIUM CHLORIDE: 9 INJECTION, SOLUTION INTRAVENOUS at 07:08

## 2022-05-31 NOTE — DISCHARGE INSTRUCTIONS
Cardiac Angiogram Discharge Instructions - Radial    After you go home:    Have an adult stay with you until tomorrow.  Drink extra fluids for 2 days.  You may resume your normal diet.  No smoking       For 24 hours - due to the sedation you received:  Relax and take it easy.  Do NOT make any important or legal decisions.  Do NOT drive or operate machines at home or at work.  Do NOT drink alcohol.    Care of Wrist Puncture Site:    For the first 24 hrs - check the puncture site every 1-2 hours while awake.  It is normal to have soreness at the puncture site and mild tingling in your hand for up to 3 days.  Remove the bandaid after 24 hours. If there is minor oozing, apply another bandaid and remove it after 12 hours.  You may shower tomorrow.  Do NOT take a bath, or use a hot tub or pool for at least 3 days. Do NOT scrub the site. Do not use lotion or powder near the puncture site.           Activity:        For 2 days:   do not use your hand or arm to support your weight (such as rising from a chair)   do not bend your wrist (such as lifting a garage door).  do not lift more than 5 pounds or exercise your arm (such as tennis, golf or bowling).  Do NOT do any heavy activity such as exercise, lifting, or straining.     Bleeding:    If you start bleeding from the site in your wrist, sit down and press firmly on/above the site for 10 minutes.   Once bleeding stops, keep arm still for 2 hours.   Call [facility] as soon as you can.       Call 911 right away if you have heavy bleeding or bleeding that does not stop.      Medicines:    If you are taking an antiplatelet medication such as Plavix, Brilinta or Effient, do not stop taking it until you talk to your cardiologist.      If you are on Metformin (Glucophage), do not restart it until you have blood tests (within 2 to 3 days after discharge).  After you have your blood drawn, you may restart the Metformin.   Take your medications, including blood thinners, unless your  provider tells you not to.    If you take Coumadin (Warfarin), have your INR checked by your provider in  3-5 days. Call your clinic to schedule this.  If you have stopped any medicines, check with your provider about when to restart them.    Follow Up Appointments:    Follow up with [facility] Nurse Practitioner at [facility] of patient preference in 7-10 days.    Call the clinic if:    You have a large or growing hard lump around the site.  The site is red, swollen, hot or tender.  Blood or fluid is draining from the site.  You have chills or a fever greater than 101 F (38 C).  Your arm feels numb, cool or changes color.  You have hives, a rash or unusual itching.  Any questions or concerns.          [facility] [place]:    [phone] [facility] (7 days a week)

## 2022-05-31 NOTE — Clinical Note
Family has been updated.   Needs labs. Left message for patient to return call  Smita Enriquez, JEIMYN, RN

## 2022-05-31 NOTE — PROGRESS NOTES
PATIENT/VISITOR WELLNESS SCREENING    Step 1 Patient Screening    1. In the last month, have you been in contact with someone who was confirmed or suspected to have Coronavirus/COVID-19? No    2. Do you have the following symptoms?  Fever/Chills? No   Cough? No   Shortness of breath? No   New loss of taste or smell? No  Sore throat? No  Muscle or body aches? No  Headaches? No  Fatigue? No  Vomiting or diarrhea? No    Step 2 Visitor Screening    1. Name of Visitor (1 visitor per patient): Yash-spouse     2. In the last month, have you been in contact with someone who was confirmed or suspected to have Coronavirus/COVID-19? No    3. Do you have the following symptoms?  Fever/Chills? No   Cough? No   Shortness of breath? No   Skin rash? No   Loss of taste or smell? No  Sore throat? No  Runny or stuffy nose? No  Muscle or body aches? No  Headaches? No  Fatigue? No  Vomiting or diarrhea? No    If the visitor has positive symptoms, notify supervisor/manger  Per policy, the visitor will need to leave the facility     Step 3 Refer to logic grid below for actions    NO SYMPTOM(S)    ACTIONS:  1. Standard rooming process  2. Provider to assess per normal protocol  3. Implement precautions as needed and per guidelines     POSITIVE SYMPTOM(S)  If positive for ANY of the following symptoms: fever, cough, shortness of breath, rash    ACTION:  1. Continue to have the patient wear a mask   2. Room patient as soon as possible  3. Don appropriate PPE when entering room  4. Provider evaluation    Care Suites Admission Nursing Note    Patient Information  Name: Glenny Butts  Age: 61 year old  Reason for admission: Heart cath   Care Suites arrival time: ~0630    Visitor Information  Name: Fabiana   Informed of visitor restrictions: Yes  1 visitor allowed per patient   Visitor must screen negative for COVID symptoms   Visitor must wear a mask  Waiting rooms closed to visitors    Patient Admission/Assessment   Pre-procedure  assessment complete: Yes  If abnormal assessment/labs, provider notified: N/A  NPO: Yes  Medications held per instructions/orders: Yes  Consent: deferred  Patient oriented to room: Yes  Education/questions answered: Yes  Plan/other: Procedure ~0830    Discharge Planning  Discharge name/phone number:Yash-spouse   Overnight post sedation caregiver: Spouse   Discharge location: home    Yuly Venegas RN

## 2022-05-31 NOTE — PROGRESS NOTES
Care Suites Discharge Nursing Note    Patient Information  Name: Glenny Butts  Age: 61 year old    Discharge Education:  Discharge instructions reviewed: Yes  Additional education/resources provided:   Patient/patient representative verbalizes understanding: Yes  Patient discharging on new medications: No  Medication education completed: N/A    Discharge Plans:   Discharge location: home  Discharge ride contacted: Yes  Approximate discharge time: 1230    Discharge Criteria:  Discharge criteria met and vital signs stable: Yes    Patient Belongs:  Patient belongings returned to patient: Yes    Leana Vergara RN

## 2022-06-03 LAB
ATRIAL RATE - MUSE: 50 BPM
DIASTOLIC BLOOD PRESSURE - MUSE: NORMAL MMHG
INTERPRETATION ECG - MUSE: NORMAL
P AXIS - MUSE: 68 DEGREES
PR INTERVAL - MUSE: 156 MS
QRS DURATION - MUSE: 86 MS
QT - MUSE: 416 MS
QTC - MUSE: 379 MS
R AXIS - MUSE: 80 DEGREES
SYSTOLIC BLOOD PRESSURE - MUSE: NORMAL MMHG
T AXIS - MUSE: 79 DEGREES
VENTRICULAR RATE- MUSE: 50 BPM

## 2022-06-15 ENCOUNTER — HOSPITAL ENCOUNTER (OUTPATIENT)
Dept: CARDIOLOGY | Facility: CLINIC | Age: 62
Discharge: HOME | End: 2022-06-15
Attending: PHYSICIAN ASSISTANT | Admitting: PHYSICIAN ASSISTANT
Payer: COMMERCIAL

## 2022-06-15 ENCOUNTER — OFFICE VISIT (OUTPATIENT)
Dept: CARDIOLOGY | Facility: CLINIC | Age: 62
End: 2022-06-15

## 2022-06-15 ENCOUNTER — LAB (OUTPATIENT)
Dept: LAB | Facility: CLINIC | Age: 62
End: 2022-06-15
Payer: COMMERCIAL

## 2022-06-15 VITALS
HEIGHT: 66 IN | HEART RATE: 61 BPM | SYSTOLIC BLOOD PRESSURE: 120 MMHG | BODY MASS INDEX: 19.75 KG/M2 | DIASTOLIC BLOOD PRESSURE: 68 MMHG | WEIGHT: 122.9 LBS

## 2022-06-15 DIAGNOSIS — I25.10 CORONARY ARTERY DISEASE INVOLVING NATIVE CORONARY ARTERY OF NATIVE HEART WITHOUT ANGINA PECTORIS: ICD-10-CM

## 2022-06-15 DIAGNOSIS — R00.2 PALPITATIONS: ICD-10-CM

## 2022-06-15 DIAGNOSIS — R00.2 PALPITATIONS: Primary | ICD-10-CM

## 2022-06-15 LAB — TSH SERPL DL<=0.005 MIU/L-ACNC: 2.4 MU/L (ref 0.4–4)

## 2022-06-15 PROCEDURE — 84443 ASSAY THYROID STIM HORMONE: CPT | Performed by: INTERNAL MEDICINE

## 2022-06-15 PROCEDURE — 36415 COLL VENOUS BLD VENIPUNCTURE: CPT | Performed by: INTERNAL MEDICINE

## 2022-06-15 PROCEDURE — 93242 EXT ECG>48HR<7D RECORDING: CPT

## 2022-06-15 PROCEDURE — 99215 OFFICE O/P EST HI 40 MIN: CPT | Mod: 25 | Performed by: PHYSICIAN ASSISTANT

## 2022-06-15 PROCEDURE — 93244 EXT ECG>48HR<7D REV&INTERPJ: CPT | Performed by: INTERNAL MEDICINE

## 2022-06-15 NOTE — LETTER
6/15/2022    Formerly [facility]  [address]  [facility] [address]    RE: Glenny Butts       Dear Colleague,     I had the pleasure of seeing Glenny Butts in the [facility].  [id number]      HPI and Plan:   See dictation    Orders this Visit:  Orders Placed This Encounter   Procedures     TSH with free T4 reflex     Lipid panel reflex to direct LDL Fasting     ALT     Follow-Up with Cardiology     Follow-Up with Cardiology MONICA     Leadless EKG Monitor 3 to 7 Days     Echocardiogram Complete     No orders of the defined types were placed in this encounter.    There are no discontinued medications.      Encounter Diagnoses   Name Primary?     Palpitations Yes     Coronary artery disease involving native coronary artery of native heart without angina pectoris        CURRENT MEDICATIONS:  Current Outpatient Medications   Medication Sig Dispense Refill     acetaminophen (TYLENOL) 325 MG tablet Take 325-650 mg by mouth every 6 hours as needed for mild pain       ASPIRIN PO Take 81 mg by mouth daily       atorvastatin (LIPITOR) 40 MG tablet Take 1 tablet (40 mg) by mouth daily 90 tablet 3     Calcium-Magnesium-Vitamin D (CALCIUM 1200+D3 PO) Take 1 tablet by mouth daily 1,000 units D3       Cyanocobalamin (VITAMIN B-12) 5000 MCG LOZG Take 5,000 mcg by mouth daily       DiphenhydrAMINE HCl (BENADRYL PO) Take 25 mg by mouth as needed Reported on 2/17/2017       doxylamine (UNISOM) 25 MG TABS Take 12.5 mg by mouth nightly as needed        lisinopril (ZESTRIL) 2.5 MG tablet Take 1 tablet (2.5 mg) by mouth daily 90 tablet 3     magnesium oxide 400 MG CAPS        melatonin 5 MG CAPS Take by mouth daily       nitroGLYcerin (NITROSTAT) 0.4 MG sublingual tablet Place 1 tablet (0.4 mg) under the tongue every 5 minutes as needed for chest pain 25 tablet 1     pantoprazole (PROTONIX) 40 MG EC tablet Take 1 tablet (40 mg) by mouth daily Take 30-60 minutes before a meal. 30 tablet 0     vitamin C  (ASCORBIC ACID) 1000 MG TABS Take 1,000 mg by mouth daily       Vitamin D3 (CHOLECALCIFEROL) 125 MCG (5000 UT) tablet Take 1 tablet by mouth daily       zinc gluconate 30 MG TABS Take 50 mg by mouth daily         ALLERGIES     Allergies   Allergen Reactions     Dust Mites      Grass      Trees        PAST MEDICAL HISTORY:  Past Medical History:   Diagnosis Date     CAD (coronary artery disease) 2/15    Stent to LAD 01/2015     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      Hyperlipidemia      Ischemic cardiomyopathy      NSTEMI (non-ST elevated myocardial infarction) (H) 2/15     S/P coronary angiogram 10-5-2015    patent prev placed stent-aggressive medical management     Stress incontinence      Syncope        PAST SURGICAL HISTORY:  Past Surgical History:   Procedure Laterality Date     ANGIOGRAM       ANGIOGRAM  2019     C DEXA, BONE DENSITY, AXIAL SKEL       CHOLECYSTECTOMY       COLONOSCOPY       CV CORONARY ANGIOGRAM N/A 5/31/2022    Procedure: Coronary Angiogram;  Surgeon: Roderick Francisco MD;  Location:  HEART CARDIAC CATH LAB     CV LEFT HEART CATH N/A 5/31/2022    Procedure: Left Heart Catheterization;  Surgeon: Roderick Francisco MD;  Location: [facility] CARDIAC CATH LAB     CV PCI N/A 5/31/2022    Procedure: Percutaneous Coronary Intervention;  Surgeon: Roderick Francisco MD;  Location:  HEART CARDIAC CATH LAB     HEART CATH, ANGIOPLASTY  01/27/2015     mid LAD-2.75 X 28 mm Promus premier FAM      HYSTERECTOMY VAGINAL N/A 12/19/2014    Procedure: HYSTERECTOMY VAGINAL;  Surgeon: Aarti Ramirez MD;  Location:  OR     wisdom teeth         FAMILY HISTORY:  Family History   Problem Relation Age of Onset     Dementia Mother      Myocardial Infarction Father 34        MI     Hypertension Brother      Hypertension Brother        SOCIAL HISTORY:  Social History     Socioeconomic History     Marital status:      Spouse name: None     Number of children: None     Years of education:  "None     Highest education level: None   Tobacco Use     Smoking status: Never Smoker     Smokeless tobacco: Never Used   Substance and Sexual Activity     Alcohol use: Yes     Comment: 4 drinks per month     Drug use: No   Other Topics Concern     Caffeine Concern No     Comment: cup of tea, no coffee     Sleep Concern Yes     Comment: takes OTC med     Stress Concern No     Weight Concern No     Special Diet Yes     Comment: LOW FAT, low sugar     Exercise Yes     Comment:  4 times week, brisk walking treadmill, elyptical     Seat Belt Yes     Parent/sibling w/ CABG, MI or angioplasty before 65F 55M? Yes       Review of Systems:  Skin:        Eyes:       ENT:       Respiratory:  Positive for    Cardiovascular:  Negative;palpitations;chest pain;dizziness;syncope or near-syncope;cyanosis Positive for;edema  Gastroenterology:      Genitourinary:       Musculoskeletal:       Neurologic:       Psychiatric:       Heme/Lymph/Imm:       Endocrine:         Physical Exam:  Vitals: /68   Pulse 61   Ht 1.676 m (5' 6\")   Wt 55.7 kg (122 lb 14.4 oz)   LMP 08/20/2014 (Exact Date)   BMI 19.84 kg/m     Please refer to dictation for physical exam    Recent Lab Results: all reviewed today  CBC RESULTS:  Lab Results   Component Value Date    WBC 4.4 05/31/2022    WBC 4.1 10/08/2018    RBC 4.70 05/31/2022    RBC 4.46 10/08/2018    HGB 14.3 05/31/2022    HGB 13.5 10/08/2018    HCT 43.2 05/31/2022    HCT 41.2 10/08/2018    MCV 92 05/31/2022    MCV 92 10/08/2018    MCH 30.4 05/31/2022    MCH 30.3 10/08/2018    MCHC 33.1 05/31/2022    MCHC 32.8 10/08/2018    RDW 12.3 05/31/2022    RDW 12.1 10/08/2018     05/31/2022     10/08/2018       BMP RESULTS:  Lab Results   Component Value Date     05/31/2022     09/16/2020    POTASSIUM 3.7 05/31/2022    POTASSIUM 4.4 09/16/2020    CHLORIDE 103 05/31/2022    CHLORIDE 102 09/16/2020    CO2 31 05/31/2022    CO2 29 09/16/2020    ANIONGAP 5 05/31/2022    ANIONGAP 9.4 " 2020    GLC 94 2022     (H) 2020    BUN 25 2022    BUN 21 2020    CR 0.82 2022    CR 0.79 2020    GFRESTIMATED 80 2022    GFRESTIMATED 74 2020    GFRESTBLACK 89 2020    PARKER 9.5 2022    PARKER 9.6 2020        INR RESULTS:  Lab Results   Component Value Date    INR 1.01 2022    INR 1.01 10/08/2018    INR 0.95 10/05/2015           CC  No referring provider defined for this encounter.    '    Service Date: 06/15/2022    CARDIOLOGIST:  Dr. Meier    REASON FOR VISIT:  Angiogram followup.    HISTORY OF PRESENT ILLNESS:  Ms. Butts is a delightful 61-year-old woman with past medical history significant for the followin.  NSTEMI in  with a drug-eluting stent to the LAD with ostial diagonal lesion of 80%-90% noted during that study.  That was managed medically.  This is a D2 lesion.  It was decided not to be intervened upon as it is a complex bifurcation lesion and there was concern it might jeopardize flow to the LAD.  2.  Dyslipidemia.    Glenny had noticed more fluttering in her chest, so she was sent for a stress echocardiogram.  This showed severe hypokinesis of the mid to distal anterolateral wall with periapical segments of the LV and anteroseptal anterior wall hypokinetic.  These were high risk findings.  Her EF was felt to be 45%-50%.  For this reason, she was sent back for an angiogram, which showed stable disease and no worsening of her LAD.  Again, diagonal was medically managed given the complexities and possible intervention and no anginal symptoms.    She comes in today and she is quite concerned about these issues as she is unclear why they have not put stents in and some notes that her resting heart rate is increased from a baseline of about 50, now to about 57.  Her heart rates have also been more elevated when she is exercising.  Occasionally, they go up into the 150's where otherwise they have just been in the 130s.   She continues to feel well and can walk 3 miles in 45 minutes and does the treadmill.  She is Cheondoism about exercise.  She denies chest pain, shortness of breath, orthopnea or PND.  She does get neck and shoulder discomfort that is hard for her to discern which she is on the phone a lot at work.    SOCIAL HISTORY:  She comes in today with her , Yash.  She is a nonsmoker, has never smoked.  She has about 4 alcoholic drinks a month.    PHYSICAL EXAMINATION:    GENERAL:  Well-developed, well-nourished, fit-appearing woman in no acute distress.  HEENT:  Normocephalic, atraumatic.  HEART:  Regular in rate and rhythm.  There is no murmur, rub or gallop.  LUNGS:  Clear without wheezes, rales, or rhonchi.    NECK: Carotids are quiet.    EXTREMITIES:  Left radial access site clean, dry and intact without bruit.    ASSESSMENT/PLAN:    1.  Coronary artery disease with patent LAD stent and a 90% bifurcation lesion of the diagonal that has not been intervened upon as there is concern it would inhibit flow to the LAD.  Her stress test was abnormal, but she is asymptomatic at this point with an excellent exercise tolerance at about 15 minute miles daily.  We discussed this in depth, and at this time, medical management is recommended.  We also reviewed some of the latest literature of survival and life expectancy with medical management versus stenting with them being similar without symptoms.  At this point, she is on optimal medications including aspirin and statin.  She is not on beta blocker due to bradycardia.  The one concern would be is if her ejection fraction is actually lower (although that data is mixed as well).  Perhaps there would be further consideration for trying a more complex procedure.  In that case, I am going to get her a complete echocardiogram in the fall.  2.  Palpitations, with minimal symptoms, but again heart rate being elevated on Fitbit. Will get a Zio Patch and review that in 6 weeks.  3.   Dyslipidemia, excellent control.  Last LDL 43, HDL 88, total cholesterol 141.      I am going to get a TSH and lipid panel today as well as ALT.  We will see her back in about 6 weeks to review results so we can do a detailed discussion.  Will otherwise in the fall get her in with Dr. Meier with a complete echocardiogram at that time.    Thank you for allowing me to participate in this delightful patient's care.    45 minutes was spent in counseling and coordination of care, answering detailed questions, and reviewing multiple studies.    Adelina Maya PA-C        D: 06/15/2022   T: 06/15/2022   MT: beau    Name:     SEBASTIEN SAMUEL  MRN:      [id number]        Account:      [id number]   :      1960           Service Date: 06/15/2022       Document: [id number]      Thank you for allowing me to participate in the care of your patient.      Sincerely,     Adelina Maya PA-C     [facility]  cc:   No referring provider defined for this encounter.

## 2022-06-15 NOTE — PROGRESS NOTES
[id number]      HPI and Plan:   See dictation    Orders this Visit:  Orders Placed This Encounter   Procedures     TSH with free T4 reflex     Lipid panel reflex to direct LDL Fasting     ALT     Follow-Up with Cardiology     Follow-Up with Cardiology MONICA     Leadless EKG Monitor 3 to 7 Days     Echocardiogram Complete     No orders of the defined types were placed in this encounter.    There are no discontinued medications.      Encounter Diagnoses   Name Primary?     Palpitations Yes     Coronary artery disease involving native coronary artery of native heart without angina pectoris        CURRENT MEDICATIONS:  Current Outpatient Medications   Medication Sig Dispense Refill     acetaminophen (TYLENOL) 325 MG tablet Take 325-650 mg by mouth every 6 hours as needed for mild pain       ASPIRIN PO Take 81 mg by mouth daily       atorvastatin (LIPITOR) 40 MG tablet Take 1 tablet (40 mg) by mouth daily 90 tablet 3     Calcium-Magnesium-Vitamin D (CALCIUM 1200+D3 PO) Take 1 tablet by mouth daily 1,000 units D3       Cyanocobalamin (VITAMIN B-12) 5000 MCG LOZG Take 5,000 mcg by mouth daily       DiphenhydrAMINE HCl (BENADRYL PO) Take 25 mg by mouth as needed Reported on 2/17/2017       doxylamine (UNISOM) 25 MG TABS Take 12.5 mg by mouth nightly as needed        lisinopril (ZESTRIL) 2.5 MG tablet Take 1 tablet (2.5 mg) by mouth daily 90 tablet 3     magnesium oxide 400 MG CAPS        melatonin 5 MG CAPS Take by mouth daily       nitroGLYcerin (NITROSTAT) 0.4 MG sublingual tablet Place 1 tablet (0.4 mg) under the tongue every 5 minutes as needed for chest pain 25 tablet 1     pantoprazole (PROTONIX) 40 MG EC tablet Take 1 tablet (40 mg) by mouth daily Take 30-60 minutes before a meal. 30 tablet 0     vitamin C (ASCORBIC ACID) 1000 MG TABS Take 1,000 mg by mouth daily       Vitamin D3 (CHOLECALCIFEROL) 125 MCG (5000 UT) tablet Take 1 tablet by mouth daily       zinc gluconate 30 MG TABS Take 50 mg by mouth daily          ALLERGIES     Allergies   Allergen Reactions     Dust Mites      Grass      Trees        PAST MEDICAL HISTORY:  Past Medical History:   Diagnosis Date     CAD (coronary artery disease) 2/15    Stent to LAD 01/2015     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      Hyperlipidemia      Ischemic cardiomyopathy      NSTEMI (non-ST elevated myocardial infarction) (H) 2/15     S/P coronary angiogram 10-5-2015    patent prev placed stent-aggressive medical management     Stress incontinence      Syncope        PAST SURGICAL HISTORY:  Past Surgical History:   Procedure Laterality Date     ANGIOGRAM       ANGIOGRAM  2019     C DEXA, BONE DENSITY, AXIAL SKEL       CHOLECYSTECTOMY       COLONOSCOPY       CV CORONARY ANGIOGRAM N/A 5/31/2022    Procedure: Coronary Angiogram;  Surgeon: Roderick Francisco MD;  Location:  HEART CARDIAC CATH LAB     CV LEFT HEART CATH N/A 5/31/2022    Procedure: Left Heart Catheterization;  Surgeon: Roderick Francisco MD;  Location:  HEART CARDIAC CATH LAB     CV PCI N/A 5/31/2022    Procedure: Percutaneous Coronary Intervention;  Surgeon: Roderick Francisco MD;  Location:  HEART CARDIAC CATH LAB     HEART CATH, ANGIOPLASTY  01/27/2015     mid LAD-2.75 X 28 mm Promus premier FAM      HYSTERECTOMY VAGINAL N/A 12/19/2014    Procedure: HYSTERECTOMY VAGINAL;  Surgeon: Aarti Ramirez MD;  Location:  OR     wisdom teeth         FAMILY HISTORY:  Family History   Problem Relation Age of Onset     Dementia Mother      Myocardial Infarction Father 34        MI     Hypertension Brother      Hypertension Brother        SOCIAL HISTORY:  Social History     Socioeconomic History     Marital status:      Spouse name: None     Number of children: None     Years of education: None     Highest education level: None   Tobacco Use     Smoking status: Never Smoker     Smokeless tobacco: Never Used   Substance and Sexual Activity     Alcohol use: Yes     Comment: 4 drinks per month      "Drug use: No   Other Topics Concern     Caffeine Concern No     Comment: cup of tea, no coffee     Sleep Concern Yes     Comment: takes OTC med     Stress Concern No     Weight Concern No     Special Diet Yes     Comment: LOW FAT, low sugar     Exercise Yes     Comment:  4 times week, brisk walking treadmill, elyptical     Seat Belt Yes     Parent/sibling w/ CABG, MI or angioplasty before 65F 55M? Yes       Review of Systems:  Skin:        Eyes:       ENT:       Respiratory:  Positive for    Cardiovascular:  Negative;palpitations;chest pain;dizziness;syncope or near-syncope;cyanosis Positive for;edema  Gastroenterology:      Genitourinary:       Musculoskeletal:       Neurologic:       Psychiatric:       Heme/Lymph/Imm:       Endocrine:         Physical Exam:  Vitals: /68   Pulse 61   Ht 1.676 m (5' 6\")   Wt 55.7 kg (122 lb 14.4 oz)   LMP 08/20/2014 (Exact Date)   BMI 19.84 kg/m     Please refer to dictation for physical exam    Recent Lab Results: all reviewed today  CBC RESULTS:  Lab Results   Component Value Date    WBC 4.4 05/31/2022    WBC 4.1 10/08/2018    RBC 4.70 05/31/2022    RBC 4.46 10/08/2018    HGB 14.3 05/31/2022    HGB 13.5 10/08/2018    HCT 43.2 05/31/2022    HCT 41.2 10/08/2018    MCV 92 05/31/2022    MCV 92 10/08/2018    MCH 30.4 05/31/2022    MCH 30.3 10/08/2018    MCHC 33.1 05/31/2022    MCHC 32.8 10/08/2018    RDW 12.3 05/31/2022    RDW 12.1 10/08/2018     05/31/2022     10/08/2018       BMP RESULTS:  Lab Results   Component Value Date     05/31/2022     09/16/2020    POTASSIUM 3.7 05/31/2022    POTASSIUM 4.4 09/16/2020    CHLORIDE 103 05/31/2022    CHLORIDE 102 09/16/2020    CO2 31 05/31/2022    CO2 29 09/16/2020    ANIONGAP 5 05/31/2022    ANIONGAP 9.4 09/16/2020    GLC 94 05/31/2022     (H) 09/16/2020    BUN 25 05/31/2022    BUN 21 09/16/2020    CR 0.82 05/31/2022    CR 0.79 09/16/2020    GFRESTIMATED 80 05/31/2022    GFRESTIMATED 74 09/16/2020    " GFRESTBLACK 89 09/16/2020    PARKER 9.5 05/31/2022    PARKER 9.6 09/16/2020        INR RESULTS:  Lab Results   Component Value Date    INR 1.01 05/31/2022    INR 1.01 10/08/2018    INR 0.95 10/05/2015           CC  No referring provider defined for this encounter.    '

## 2022-06-15 NOTE — PATIENT INSTRUCTIONS
Thank you for visiting with me today.    We discussed: we'll do a monitor to see what your heart rhythm is doing when it's going fast.     Medication changes:  continue current medications.     Follow up: with an echocardiogram and visit with Dr. Meier in September, and me in early August.        Please call my nurse, Rae, at [phone] with any questions or concerns.    Scheduling phone number: [phone]  Reminder: Please bring in all current medications, over the counter supplements and vitamin bottles to your next appointment.

## 2022-06-15 NOTE — PROGRESS NOTES
Service Date: 06/15/2022    CARDIOLOGIST:  Dr. Meier    REASON FOR VISIT:  Angiogram followup.    HISTORY OF PRESENT ILLNESS:  Ms. Butts is a delightful 61-year-old woman with past medical history significant for the followin.  NSTEMI in  with a drug-eluting stent to the LAD with ostial diagonal lesion of 80%-90% noted during that study.  That was managed medically.  This is a D2 lesion.  It was decided not to be intervened upon as it is a complex bifurcation lesion and there was concern it might jeopardize flow to the LAD.  2.  Dyslipidemia.    Glenny had noticed more fluttering in her chest, so she was sent for a stress echocardiogram.  This showed severe hypokinesis of the mid to distal anterolateral wall with periapical segments of the LV and anteroseptal anterior wall hypokinetic.  These were high risk findings.  Her EF was felt to be 45%-50%.  For this reason, she was sent back for an angiogram, which showed stable disease and no worsening of her LAD.  Again, diagonal was medically managed given the complexities and possible intervention and no anginal symptoms.    She comes in today and she is quite concerned about these issues as she is unclear why they have not put stents in and some notes that her resting heart rate is increased from a baseline of about 50, now to about 57.  Her heart rates have also been more elevated when she is exercising.  Occasionally, they go up into the 150's where otherwise they have just been in the 130s.  She continues to feel well and can walk 3 miles in 45 minutes and does the treadmill.  She is Religion about exercise.  She denies chest pain, shortness of breath, orthopnea or PND.  She does get neck and shoulder discomfort that is hard for her to discern which she is on the phone a lot at work.    SOCIAL HISTORY:  She comes in today with her , Yash.  She is a nonsmoker, has never smoked.  She has about 4 alcoholic drinks a month.    PHYSICAL EXAMINATION:     GENERAL:  Well-developed, well-nourished, fit-appearing woman in no acute distress.  HEENT:  Normocephalic, atraumatic.  HEART:  Regular in rate and rhythm.  There is no murmur, rub or gallop.  LUNGS:  Clear without wheezes, rales, or rhonchi.    NECK: Carotids are quiet.    EXTREMITIES:  Left radial access site clean, dry and intact without bruit.    ASSESSMENT/PLAN:    1.  Coronary artery disease with patent LAD stent and a 90% bifurcation lesion of the diagonal that has not been intervened upon as there is concern it would inhibit flow to the LAD.  Her stress test was abnormal, but she is asymptomatic at this point with an excellent exercise tolerance at about 15 minute miles daily.  We discussed this in depth, and at this time, medical management is recommended.  We also reviewed some of the latest literature of survival and life expectancy with medical management versus stenting with them being similar without symptoms.  At this point, she is on optimal medications including aspirin and statin.  She is not on beta blocker due to bradycardia.  The one concern would be is if her ejection fraction is actually lower (although that data is mixed as well).  Perhaps there would be further consideration for trying a more complex procedure.  In that case, I am going to get her a complete echocardiogram in the fall.  2.  Palpitations, with minimal symptoms, but again heart rate being elevated on Fitbit. Will get a Zio Patch and review that in 6 weeks.  3.  Dyslipidemia, excellent control.  Last LDL 43, HDL 88, total cholesterol 141.      I am going to get a TSH and lipid panel today as well as ALT.  We will see her back in about 6 weeks to review results so we can do a detailed discussion.  Will otherwise in the fall get her in with Dr. Meier with a complete echocardiogram at that time.    Thank you for allowing me to participate in this delightful patient's care.    45 minutes was spent in counseling and coordination of  care, answering detailed questions, and reviewing multiple studies.    Adelina Maya PA-C        D: 06/15/2022   T: 06/15/2022   MT: beau    Name:     SEBASTIEN SAMUELBerto  MRN:      [id number]        Account:      [id number]   :      1960           Service Date: 06/15/2022       Document: [id number]

## 2022-06-30 ENCOUNTER — CARE COORDINATION (OUTPATIENT)
Dept: CARDIOLOGY | Facility: CLINIC | Age: 62
End: 2022-06-30

## 2022-06-30 NOTE — PROGRESS NOTES
6/30/22 Call out to patient with results and Sarzack More CONNER comments and recommendation. Left message to call back.  Rae Virk RN 06/30/22 2:14 PM    6/30/22 Call back from patient. Reviewed Adelina More CONNER comments and recommendations. Patient reports does not have palpitations or symptoms, only noticed rapid heart rates picked up on fit bit. Glenny reports passed out from beta blocker in the past and would like to wait to discuss with Adelina at visit in August before considering to start medication. Patient call if changes her mind about medications or if symptoms or concerns before visit.    Future Appointments   Date Time Provider Department Center   8/10/2022  8:00 AM [facility]   9/16/2022  7:45 AM [id number]   9/16/2022  8:45 AM [facility]   9/19/2022  8:15 AM Radha Meier MD [facility]     .========================    Zulma, CONNER Rodas Bullis Mary, RN  Ave hr 65, min 40 ( no pauses), max 250 with nsvt.  Also some runs of SVT noted that are likely source of her fitbit recording higher HRs.  If palps are bothering her would offer trial of toprol 12.5 mg at hs vs dilt 120 mg daily.  If she'd like to wait and discuss at follow up visit that's reasonable as well.

## 2022-07-12 NOTE — TELEPHONE ENCOUNTER
Spoke to patient, says she tested positive for COVID in April via home test. Will check with supervisor if honor system is sufficient as there is no documentation for home testing.    What Type Of Note Output Would You Prefer (Optional)?: Bullet Format Is The Patient Presenting As Previously Scheduled?: Yes How Severe Is Your Rash?: mild Is This A New Presentation, Or A Follow-Up?: Rash

## 2022-08-10 ENCOUNTER — OFFICE VISIT (OUTPATIENT)
Dept: CARDIOLOGY | Facility: CLINIC | Age: 62
End: 2022-08-10
Attending: PHYSICIAN ASSISTANT
Payer: COMMERCIAL

## 2022-08-10 VITALS
BODY MASS INDEX: 19.69 KG/M2 | SYSTOLIC BLOOD PRESSURE: 110 MMHG | DIASTOLIC BLOOD PRESSURE: 66 MMHG | HEART RATE: 57 BPM | HEIGHT: 66 IN | WEIGHT: 122.5 LBS

## 2022-08-10 DIAGNOSIS — I25.10 CORONARY ARTERY DISEASE INVOLVING NATIVE CORONARY ARTERY OF NATIVE HEART WITHOUT ANGINA PECTORIS: ICD-10-CM

## 2022-08-10 DIAGNOSIS — R00.2 PALPITATIONS: ICD-10-CM

## 2022-08-10 PROCEDURE — 99214 OFFICE O/P EST MOD 30 MIN: CPT | Performed by: PHYSICIAN ASSISTANT

## 2022-08-10 NOTE — PATIENT INSTRUCTIONS
Thank you for visiting with me today.    We discussed: we'll continue to watch for symptoms of chest pain, shortness of breath, passing out or almost passing out.  If these happen please call.     Medication changes:  continue current medications.      Follow up: as scheduled with Dr. Meier in September with your echocardiogram.       Please call my nurse, Rae, at [phone] with any questions or concerns.    Scheduling phone number: [phone]  Reminder: Please bring in all current medications, over the counter supplements and vitamin bottles to your next appointment.

## 2022-08-10 NOTE — PROGRESS NOTES
[id number]      HPI and Plan:   See dictation    Orders this Visit:  No orders of the defined types were placed in this encounter.    No orders of the defined types were placed in this encounter.    There are no discontinued medications.      Encounter Diagnoses   Name Primary?     Palpitations      Coronary artery disease involving native coronary artery of native heart without angina pectoris        CURRENT MEDICATIONS:  Current Outpatient Medications   Medication Sig Dispense Refill     acetaminophen (TYLENOL) 325 MG tablet Take 325-650 mg by mouth every 6 hours as needed for mild pain       ASPIRIN PO Take 81 mg by mouth daily       atorvastatin (LIPITOR) 40 MG tablet Take 1 tablet (40 mg) by mouth daily 90 tablet 3     Calcium-Magnesium-Vitamin D (CALCIUM 1200+D3 PO) Take 1 tablet by mouth daily 1,000 units D3       Cyanocobalamin (VITAMIN B-12) 5000 MCG LOZG Take 5,000 mcg by mouth daily       DiphenhydrAMINE HCl (BENADRYL PO) Take 25 mg by mouth as needed Reported on 2/17/2017       doxylamine (UNISOM) 25 MG TABS Take 12.5 mg by mouth nightly as needed        lisinopril (ZESTRIL) 2.5 MG tablet Take 1 tablet (2.5 mg) by mouth daily 90 tablet 3     magnesium oxide 400 MG CAPS        melatonin 5 MG CAPS Take by mouth daily       nitroGLYcerin (NITROSTAT) 0.4 MG sublingual tablet Place 1 tablet (0.4 mg) under the tongue every 5 minutes as needed for chest pain 25 tablet 1     pantoprazole (PROTONIX) 40 MG EC tablet Take 1 tablet (40 mg) by mouth daily Take 30-60 minutes before a meal. 30 tablet 0     vitamin C (ASCORBIC ACID) 1000 MG TABS Take 1,000 mg by mouth daily       Vitamin D3 (CHOLECALCIFEROL) 125 MCG (5000 UT) tablet Take 1 tablet by mouth daily       zinc gluconate 30 MG TABS Take 50 mg by mouth daily         ALLERGIES     Allergies   Allergen Reactions     Dust Mites      Grass      Trees        PAST MEDICAL HISTORY:  Past Medical History:   Diagnosis Date     CAD (coronary artery disease) 2/15    Stent  to LAD 01/2015     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      Hyperlipidemia      Ischemic cardiomyopathy      NSTEMI (non-ST elevated myocardial infarction) (H) 2/15     S/P coronary angiogram 10-5-2015    patent prev placed stent-aggressive medical management     Stress incontinence      Syncope        PAST SURGICAL HISTORY:  Past Surgical History:   Procedure Laterality Date     ANGIOGRAM       ANGIOGRAM  2019     C DEXA, BONE DENSITY, AXIAL SKEL       CHOLECYSTECTOMY       COLONOSCOPY       CV CORONARY ANGIOGRAM N/A 5/31/2022    Procedure: Coronary Angiogram;  Surgeon: Roderick Francisco MD;  Location:  [facility]     CV LEFT HEART CATH N/A 5/31/2022    Procedure: Left Heart Catheterization;  Surgeon: Roderick Francisco MD;  Location:  [facility]     CV PCI N/A 5/31/2022    Procedure: Percutaneous Coronary Intervention;  Surgeon: Roderick Francisco MD;  Location:  [facility]ATH [facility]     HEART CATH, ANGIOPLASTY  01/27/2015     mid LAD-2.75 X 28 mm Promus premier FAM      HYSTERECTOMY VAGINAL N/A 12/19/2014    Procedure: HYSTERECTOMY VAGINAL;  Surgeon: Aarti Ramirez MD;  Location:  OR     wisdom teeth         FAMILY HISTORY:  Family History   Problem Relation Age of Onset     Dementia Mother      Myocardial Infarction Father 34        MI     Hypertension Brother      Hypertension Brother        SOCIAL HISTORY:  Social History     Socioeconomic History     Marital status:      Spouse name: None     Number of children: None     Years of education: None     Highest education level: None   Tobacco Use     Smoking status: Never Smoker     Smokeless tobacco: Never Used   Substance and Sexual Activity     Alcohol use: Yes     Comment: 4 drinks per month     Drug use: No   Other Topics Concern     Caffeine Concern No     Comment: cup of tea, no coffee     Sleep Concern Yes     Comment: takes OTC med     Stress Concern No     Weight Concern No     Special Diet  "Yes     Comment: LOW FAT, low sugar     Exercise Yes     Comment:  4 times week, brisk walking treadmill, elyptical     Seat Belt Yes     Parent/sibling w/ CABG, MI or angioplasty before 65F 55M? Yes       Review of Systems:  Skin:  not assessed     Eyes:  not assessed    ENT:  not assessed    Respiratory:  Negative    Cardiovascular:  Negative    Gastroenterology: not assessed    Genitourinary:  not assessed    Musculoskeletal:  not assessed    Neurologic:  not assessed    Psychiatric:  not assessed    Heme/Lymph/Imm:  Positive for allergies  Endocrine:  Negative      Physical Exam:  Vitals: /66 (BP Location: Left arm, Cuff Size: Adult Regular)   Pulse 57   Ht 1.676 m (5' 6\")   Wt 55.6 kg (122 lb 8 oz)   LMP 08/20/2014 (Exact Date)   BMI 19.77 kg/m     Please refer to dictation for physical exam    Recent Lab Results: all reviewed today  CBC RESULTS:  Lab Results   Component Value Date    WBC 4.4 05/31/2022    WBC 4.1 10/08/2018    RBC 4.70 05/31/2022    RBC 4.46 10/08/2018    HGB 14.3 05/31/2022    HGB 13.5 10/08/2018    HCT 43.2 05/31/2022    HCT 41.2 10/08/2018    MCV 92 05/31/2022    MCV 92 10/08/2018    MCH 30.4 05/31/2022    MCH 30.3 10/08/2018    MCHC 33.1 05/31/2022    MCHC 32.8 10/08/2018    RDW 12.3 05/31/2022    RDW 12.1 10/08/2018     05/31/2022     10/08/2018       BMP RESULTS:  Lab Results   Component Value Date     05/31/2022     09/16/2020    POTASSIUM 3.7 05/31/2022    POTASSIUM 4.4 09/16/2020    CHLORIDE 103 05/31/2022    CHLORIDE 102 09/16/2020    CO2 31 05/31/2022    CO2 29 09/16/2020    ANIONGAP 5 05/31/2022    ANIONGAP 9.4 09/16/2020    GLC 94 05/31/2022     (H) 09/16/2020    BUN 25 05/31/2022    BUN 21 09/16/2020    CR 0.82 05/31/2022    CR 0.79 09/16/2020    GFRESTIMATED 80 05/31/2022    GFRESTIMATED 74 09/16/2020    GFRESTBLACK 89 09/16/2020    PARKER 9.5 05/31/2022    PARKER 9.6 09/16/2020        INR RESULTS:  Lab Results   Component Value Date    INR " 1.01 05/31/2022    INR 1.01 10/08/2018    INR 0.95 10/05/2015           CC  Adelina Maya PARickyC  [address]  [place] [id number]

## 2022-08-10 NOTE — LETTER
8/10/2022    [facility]  [address]  [facility] [address]    RE: Glenny Butts       Dear Colleague,     I had the pleasure of seeing Glenny Butts in the [facility].  [id number]      HPI and Plan:   See dictation    Orders this Visit:  No orders of the defined types were placed in this encounter.    No orders of the defined types were placed in this encounter.    There are no discontinued medications.      Encounter Diagnoses   Name Primary?     Palpitations      Coronary artery disease involving native coronary artery of native heart without angina pectoris        CURRENT MEDICATIONS:  Current Outpatient Medications   Medication Sig Dispense Refill     acetaminophen (TYLENOL) 325 MG tablet Take 325-650 mg by mouth every 6 hours as needed for mild pain       ASPIRIN PO Take 81 mg by mouth daily       atorvastatin (LIPITOR) 40 MG tablet Take 1 tablet (40 mg) by mouth daily 90 tablet 3     Calcium-Magnesium-Vitamin D (CALCIUM 1200+D3 PO) Take 1 tablet by mouth daily 1,000 units D3       Cyanocobalamin (VITAMIN B-12) 5000 MCG LOZG Take 5,000 mcg by mouth daily       DiphenhydrAMINE HCl (BENADRYL PO) Take 25 mg by mouth as needed Reported on 2/17/2017       doxylamine (UNISOM) 25 MG TABS Take 12.5 mg by mouth nightly as needed        lisinopril (ZESTRIL) 2.5 MG tablet Take 1 tablet (2.5 mg) by mouth daily 90 tablet 3     magnesium oxide 400 MG CAPS        melatonin 5 MG CAPS Take by mouth daily       nitroGLYcerin (NITROSTAT) 0.4 MG sublingual tablet Place 1 tablet (0.4 mg) under the tongue every 5 minutes as needed for chest pain 25 tablet 1     pantoprazole (PROTONIX) 40 MG EC tablet Take 1 tablet (40 mg) by mouth daily Take 30-60 minutes before a meal. 30 tablet 0     vitamin C (ASCORBIC ACID) 1000 MG TABS Take 1,000 mg by mouth daily       Vitamin D3 (CHOLECALCIFEROL) 125 MCG (5000 UT) tablet Take 1 tablet by mouth daily       zinc gluconate 30 MG TABS Take 50 mg by  mouth daily         ALLERGIES     Allergies   Allergen Reactions     Dust Mites      Grass      Trees        PAST MEDICAL HISTORY:  Past Medical History:   Diagnosis Date     CAD (coronary artery disease) 2/15    Stent to LAD 01/2015     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      Hyperlipidemia      Ischemic cardiomyopathy      NSTEMI (non-ST elevated myocardial infarction) (H) 2/15     S/P coronary angiogram 10-5-2015    patent prev placed stent-aggressive medical management     Stress incontinence      Syncope        PAST SURGICAL HISTORY:  Past Surgical History:   Procedure Laterality Date     ANGIOGRAM       ANGIOGRAM  2019     C DEXA, BONE DENSITY, AXIAL SKEL       CHOLECYSTECTOMY       COLONOSCOPY       CV CORONARY ANGIOGRAM N/A 5/31/2022    Procedure: Coronary Angiogram;  Surgeon: Roderick Francisco MD;  Location:  HEART CARDIAC CATH LAB     CV LEFT HEART CATH N/A 5/31/2022    Procedure: Left Heart Catheterization;  Surgeon: Roderick Francisco MD;  Location:  HEART CARDIAC CATH LAB     CV PCI N/A 5/31/2022    Procedure: Percutaneous Coronary Intervention;  Surgeon: Roderick Francisco MD;  Location:  HEART CARDIAC CATH LAB     HEART CATH, ANGIOPLASTY  01/27/2015     mid LAD-2.75 X 28 mm Promus premier FAM      HYSTERECTOMY VAGINAL N/A 12/19/2014    Procedure: HYSTERECTOMY VAGINAL;  Surgeon: Aarti Ramirez MD;  Location:  OR     wisdom teeth         FAMILY HISTORY:  Family History   Problem Relation Age of Onset     Dementia Mother      Myocardial Infarction Father 34        MI     Hypertension Brother      Hypertension Brother        SOCIAL HISTORY:  Social History     Socioeconomic History     Marital status:      Spouse name: None     Number of children: None     Years of education: None     Highest education level: None   Tobacco Use     Smoking status: Never Smoker     Smokeless tobacco: Never Used   Substance and Sexual Activity     Alcohol use: Yes     Comment: 4 drinks  "per month     Drug use: No   Other Topics Concern     Caffeine Concern No     Comment: cup of tea, no coffee     Sleep Concern Yes     Comment: takes OTC med     Stress Concern No     Weight Concern No     Special Diet Yes     Comment: LOW FAT, low sugar     Exercise Yes     Comment:  4 times week, brisk walking treadmill, elyptical     Seat Belt Yes     Parent/sibling w/ CABG, MI or angioplasty before 65F 55M? Yes       Review of Systems:  Skin:  not assessed     Eyes:  not assessed    ENT:  not assessed    Respiratory:  Negative    Cardiovascular:  Negative    Gastroenterology: not assessed    Genitourinary:  not assessed    Musculoskeletal:  not assessed    Neurologic:  not assessed    Psychiatric:  not assessed    Heme/Lymph/Imm:  Positive for allergies  Endocrine:  Negative      Physical Exam:  Vitals: /66 (BP Location: Left arm, Cuff Size: Adult Regular)   Pulse 57   Ht 1.676 m (5' 6\")   Wt 55.6 kg (122 lb 8 oz)   LMP 08/20/2014 (Exact Date)   BMI 19.77 kg/m     Please refer to dictation for physical exam    Recent Lab Results: all reviewed today  CBC RESULTS:  Lab Results   Component Value Date    WBC 4.4 05/31/2022    WBC 4.1 10/08/2018    RBC 4.70 05/31/2022    RBC 4.46 10/08/2018    HGB 14.3 05/31/2022    HGB 13.5 10/08/2018    HCT 43.2 05/31/2022    HCT 41.2 10/08/2018    MCV 92 05/31/2022    MCV 92 10/08/2018    MCH 30.4 05/31/2022    MCH 30.3 10/08/2018    MCHC 33.1 05/31/2022    MCHC 32.8 10/08/2018    RDW 12.3 05/31/2022    RDW 12.1 10/08/2018     05/31/2022     10/08/2018       BMP RESULTS:  Lab Results   Component Value Date     05/31/2022     09/16/2020    POTASSIUM 3.7 05/31/2022    POTASSIUM 4.4 09/16/2020    CHLORIDE 103 05/31/2022    CHLORIDE 102 09/16/2020    CO2 31 05/31/2022    CO2 29 09/16/2020    ANIONGAP 5 05/31/2022    ANIONGAP 9.4 09/16/2020    GLC 94 05/31/2022     (H) 09/16/2020    BUN 25 05/31/2022    BUN 21 09/16/2020    CR 0.82 05/31/2022 "    CR 0.79 2020    GFRESTIMATED 80 2022    GFRESTIMATED 74 2020    GFRESTBLACK 89 2020    PARKER 9.5 2022    PARKER 9.6 2020        INR RESULTS:  Lab Results   Component Value Date    INR 1.01 2022    INR 1.01 10/08/2018    INR 0.95 10/05/2015           CC  Adelina Maya PA-C  [address]  [place] [address]        Service Date: 08/10/2022    PRIMARY CARDIOLOGIST:  Dr. Meier     REASON FOR VISIT:  Zio Patch followup.    HISTORY OF PRESENT ILLNESS:  Ms. Butts is a delightful 61-year-old woman with past medical history significant for the followin.  NSTEMI in  with drug-eluting stent to the LAD for a completely occluded LAD.  At that point, she had an 80%-90% D2 lesion that was not intervened upon given its difficult location.  She recently underwent a stress echocardiogram that showed severe hypokinesis of the mid to distal anterolateral wall and periapical segments with an EF of 45%-50%.  Her angiogram was stable.  Given that she had no anginal symptoms, there is ongoing medical management.  2.  Dyslipidemia.  3.  Palpitations with recent Zio Patch showing brief runs of SVT, 2 brief runs of nonsustained VT at 4-5 beats.    Last time I saw her, we reviewed in detail her angiogram and went over the reasons for medical management.  She still noted that her Fitbit appeared to be giving her higher rates than she expected so we put her on a Zio patch.  In summary, this showed 11 runs of supraventricular tachycardia that were asymptomatic and 2 short 4 and 5-beat runs of nonsustained VT that was also symptomatic.  She had no significant heart block, pauses or sustained arrhythmias.    She comes in today.  She overall feels well.  She denies chest pain, shortness of breath, orthopnea or PND.  She and her  get up and walk right away in the morning and she can do up to 4.2 miles per hour.  She previously was doing 5.3 miles per hour, so this is a little  bit of a decrease and she stops at that level because her heart rate is hitting about 150.  She denies orthopnea or PND.    SOCIAL HISTORY:  She comes in today with her , Yash.  She is a nonsmoker, has never smoked, has about 4 drinks a month.  One cup of coffee a day.    PHYSICAL EXAMINATION:    GENERAL:  Well-developed, well-nourished, fit-appearing woman in no acute distress.  HEENT:  Normocephalic, atraumatic.  HEART:  Regular in rate and rhythm.  I do not appreciate murmur, rub or gallop.  LUNGS:  Clear, without wheezes, rales, or rhonchi.  EXTREMITIES:  Without peripheral edema.  SKIN:  Warm and dry.    ASSESSMENT AND PLAN:    1.  Coronary artery disease with patent LAD stent and 90% bifurcation lesion of the diagonal that has not been intervened upon given concerns it would inhibit flow to the LAD.  Her stress test was abnormal, but she has excellent exercise capacity and no anginal symptoms.  At this point.  We recommend ongoing medical management and we discussed that if her symptoms change or palpitations become more persistent and bothersome certainly could discuss this again.  We will do a standard echocardiogram in about a month to make sure her overall ejection fraction is not down.  2.  Brief runs of nonsustained ventricular tachycardia and supraventricular tachycardia.  We discussed the option of medicine, but again these are asymptomatic and brief and do not necessarily require treatment.  We did review her Zio Patch line by line today. She overall has a low burden of ectopy that is reassuring.  3.  Dyslipidemia, excellent control.    Thank you for allowing me to participate in this delightful patient's care.  She will follow up as scheduled with Dr. Meier in September with an echocardiogram and labs before that visit.  She prefers to keep this visit to be able to review everything in detail with him as well.    Adelina Maya PA-C        D: 08/10/2022   T: 08/10/2022   MT: beau    Name:      Glenny Butts  MRN:      [id number]        Account:      [id number]   :      1960           Service Date: 08/10/2022       Document: [id number]      Thank you for allowing me to participate in the care of your patient.      Sincerely,     Adelina Maya PA-C     [facility]  cc:   Adelina Maya PA-C  [address]  [place] [address]

## 2022-08-10 NOTE — PROGRESS NOTES
Service Date: 08/10/2022    PRIMARY CARDIOLOGIST:  Dr. Meier     REASON FOR VISIT:  Zio Patch followup.    HISTORY OF PRESENT ILLNESS:  Ms. Butts is a delightful 61-year-old woman with past medical history significant for the followin.  NSTEMI in  with drug-eluting stent to the LAD for a completely occluded LAD.  At that point, she had an 80%-90% D2 lesion that was not intervened upon given its difficult location.  She recently underwent a stress echocardiogram that showed severe hypokinesis of the mid to distal anterolateral wall and periapical segments with an EF of 45%-50%.  Her angiogram was stable.  Given that she had no anginal symptoms, there is ongoing medical management.  2.  Dyslipidemia.  3.  Palpitations with recent Zio Patch showing brief runs of SVT, 2 brief runs of nonsustained VT at 4-5 beats.    Last time I saw her, we reviewed in detail her angiogram and went over the reasons for medical management.  She still noted that her Fitbit appeared to be giving her higher rates than she expected so we put her on a Zio patch.  In summary, this showed 11 runs of supraventricular tachycardia that were asymptomatic and 2 short 4 and 5-beat runs of nonsustained VT that was also symptomatic.  She had no significant heart block, pauses or sustained arrhythmias.    She comes in today.  She overall feels well.  She denies chest pain, shortness of breath, orthopnea or PND.  She and her  get up and walk right away in the morning and she can do up to 4.2 miles per hour.  She previously was doing 5.3 miles per hour, so this is a little bit of a decrease and she stops at that level because her heart rate is hitting about 150.  She denies orthopnea or PND.    SOCIAL HISTORY:  She comes in today with her , Yash.  She is a nonsmoker, has never smoked, has about 4 drinks a month.  One cup of coffee a day.    PHYSICAL EXAMINATION:    GENERAL:  Well-developed, well-nourished, fit-appearing woman in no  acute distress.  HEENT:  Normocephalic, atraumatic.  HEART:  Regular in rate and rhythm.  I do not appreciate murmur, rub or gallop.  LUNGS:  Clear, without wheezes, rales, or rhonchi.  EXTREMITIES:  Without peripheral edema.  SKIN:  Warm and dry.    ASSESSMENT AND PLAN:    1.  Coronary artery disease with patent LAD stent and 90% bifurcation lesion of the diagonal that has not been intervened upon given concerns it would inhibit flow to the LAD.  Her stress test was abnormal, but she has excellent exercise capacity and no anginal symptoms.  At this point.  We recommend ongoing medical management and we discussed that if her symptoms change or palpitations become more persistent and bothersome certainly could discuss this again.  We will do a standard echocardiogram in about a month to make sure her overall ejection fraction is not down.  2.  Brief runs of nonsustained ventricular tachycardia and supraventricular tachycardia.  We discussed the option of medicine, but again these are asymptomatic and brief and do not necessarily require treatment.  We did review her Zio Patch line by line today. She overall has a low burden of ectopy that is reassuring.  3.  Dyslipidemia, excellent control.    Thank you for allowing me to participate in this delightful patient's care.  She will follow up as scheduled with Dr. Meier in September with an echocardiogram and labs before that visit.  She prefers to keep this visit to be able to review everything in detail with him as well.    Adelina Maya PA-C        D: 08/10/2022   T: 08/10/2022   MT: beau    Name:     Glenny Butts  MRN:      [id number]        Account:      [id number]   :      1960           Service Date: 08/10/2022       Document: [id number]

## 2022-09-16 ENCOUNTER — HOSPITAL ENCOUNTER (OUTPATIENT)
Dept: CARDIOLOGY | Facility: CLINIC | Age: 62
Discharge: HOME | End: 2022-09-16
Attending: PHYSICIAN ASSISTANT | Admitting: PHYSICIAN ASSISTANT
Payer: COMMERCIAL

## 2022-09-16 ENCOUNTER — LAB (OUTPATIENT)
Dept: LAB | Facility: CLINIC | Age: 62
End: 2022-09-16
Payer: COMMERCIAL

## 2022-09-16 DIAGNOSIS — I25.5 ISCHEMIC CARDIOMYOPATHY: ICD-10-CM

## 2022-09-16 DIAGNOSIS — I25.10 CORONARY ARTERY DISEASE INVOLVING NATIVE CORONARY ARTERY OF NATIVE HEART WITHOUT ANGINA PECTORIS: Primary | ICD-10-CM

## 2022-09-16 DIAGNOSIS — E78.00 PURE HYPERCHOLESTEROLEMIA: ICD-10-CM

## 2022-09-16 LAB
ALT SERPL W P-5'-P-CCNC: 34 U/L (ref 0–50)
ANION GAP SERPL CALCULATED.3IONS-SCNC: 4 MMOL/L (ref 3–14)
BUN SERPL-MCNC: 25 MG/DL (ref 7–30)
CALCIUM SERPL-MCNC: 9.2 MG/DL (ref 8.5–10.1)
CHLORIDE BLD-SCNC: 105 MMOL/L (ref 94–109)
CHOLEST SERPL-MCNC: 132 MG/DL
CO2 SERPL-SCNC: 30 MMOL/L (ref 20–32)
CREAT SERPL-MCNC: 0.71 MG/DL (ref 0.52–1.04)
FASTING STATUS PATIENT QL REPORTED: YES
GFR SERPL CREATININE-BSD FRML MDRD: >90 ML/MIN/1.73M2
GLUCOSE BLD-MCNC: 90 MG/DL (ref 70–99)
HDLC SERPL-MCNC: 84 MG/DL
LDLC SERPL CALC-MCNC: 42 MG/DL
LVEF ECHO: NORMAL
NONHDLC SERPL-MCNC: 48 MG/DL
POTASSIUM BLD-SCNC: 4.2 MMOL/L (ref 3.4–5.3)
SODIUM SERPL-SCNC: 139 MMOL/L (ref 133–144)
TRIGL SERPL-MCNC: 32 MG/DL

## 2022-09-16 PROCEDURE — 80048 BASIC METABOLIC PNL TOTAL CA: CPT | Performed by: INTERNAL MEDICINE

## 2022-09-16 PROCEDURE — 80061 LIPID PANEL: CPT | Performed by: INTERNAL MEDICINE

## 2022-09-16 PROCEDURE — 84460 ALANINE AMINO (ALT) (SGPT): CPT | Performed by: INTERNAL MEDICINE

## 2022-09-16 PROCEDURE — 36415 COLL VENOUS BLD VENIPUNCTURE: CPT | Performed by: INTERNAL MEDICINE

## 2022-09-16 PROCEDURE — 93306 TTE W/DOPPLER COMPLETE: CPT

## 2022-09-16 PROCEDURE — 93306 TTE W/DOPPLER COMPLETE: CPT | Mod: 26 | Performed by: INTERNAL MEDICINE

## 2022-09-17 NOTE — PROGRESS NOTES
HPI and Plan:     I had the pleasure of seeing Ms. Butts in followup at [facility] today.  She is a very pleasant 61-year-old female who I last saw in 09/2021.  She has a history of coronary artery disease and is status post non-ST elevation myocardial infarction for which she underwent PCI to the LAD in 2015.  She underwent repeat coronary angiography in 2019 for chest discomfort and was found to have a widely patent stent in the LAD without evidence of restenosis.  Ostial narrowing of the first diagonal branch in the 80%-90% range was noted but unchanged compared to her prior angiogram.  Mild-to-moderate nonobstructive disease was noted elsewhere and medical therapy was recommended.      The patient presents today after recent evaluation for chest fluttering and arm discomfort that prompted a stress echocardiogram and May of this year.  This demonstrated severe hypokinesis of the mid to distal anterolateral wall and apex of the left ventricle, although she was able to exercise 11 minutes without any symptoms.    Given these findings, I referred her for coronary angiography which was performed on 5/31/2022.  She was found to have essentially stable findings compared to her prior angiogram with a patent LAD stent and significant disease involving the diagonal as previously described.  Medical therapy was recommended, and a subsequent Zio patch monitor demonstrated short runs of SVT/nonsustained ventricular tachycardia.  She also underwent an echocardiogram on 9/16/2022 which demonstrated low normal left ventricular systolic function.    Today she feels well overall from a cardiovascular standpoint.  She has been walking 5 days a week on the treadmill for approximately 30 minutes without any limitations.  Specifically she denies any exertional chest discomfort, palpitations, syncope or presyncope.       PHYSICAL EXAMINATION:  Dictated below.      LABORATORY STUDIES:  Lipids on  9/16/2022 demonstrate total cholesterol 132 HDL of 84 LDL of 42 and triglycerides of 32.     IMPRESSION:      1. Coronary artery disease status post PCI to the LAD in 2015 as described above.   2.  Recent chest fluttering and an abnormal stress echocardiogram that triggered coronary angiography with findings as described above.  3.  Dyslipidemia.  Under excellent control on atorvastatin 40 mg daily.    I went over the patient's recent coronary angiogram, echocardiogram and Zio patch monitor findings with her in detail.  In the absence of symptoms, I think that continued medical therapy for coronary artery disease is appropriate.  I would like to optimize her therapy further with the addition of metoprolol XL 12.5 mg daily.    Of note, she was on metoprolol and 2017 but this was discontinued after syncopal episode and sinus bradycardia noted on event monitoring.  I will obtain a follow-up 24-hour Holter monitor for reassessment in this regard once she starts metoprolol.    It was a pleasure seeing her today.  Assuming her clinical assessment stable, I will plan a follow-up in approximately 6 months.                CURRENT MEDICATIONS:  Current Outpatient Medications   Medication Sig Dispense Refill     acetaminophen (TYLENOL) 325 MG tablet Take 325-650 mg by mouth every 6 hours as needed for mild pain       ASPIRIN PO Take 81 mg by mouth daily       atorvastatin (LIPITOR) 40 MG tablet Take 1 tablet (40 mg) by mouth daily 90 tablet 3     Calcium-Magnesium-Vitamin D (CALCIUM 1200+D3 PO) Take 1 tablet by mouth daily 1,000 units D3       Cyanocobalamin (VITAMIN B-12) 5000 MCG LOZG Take 5,000 mcg by mouth daily       DiphenhydrAMINE HCl (BENADRYL PO) Take 25 mg by mouth as needed Reported on 2/17/2017       doxylamine (UNISOM) 25 MG TABS Take 12.5 mg by mouth nightly as needed        lisinopril (ZESTRIL) 2.5 MG tablet Take 1 tablet (2.5 mg) by mouth daily 90 tablet 3     magnesium oxide 400 MG CAPS        melatonin 5 MG  CAPS Take by mouth daily       nitroGLYcerin (NITROSTAT) 0.4 MG sublingual tablet Place 1 tablet (0.4 mg) under the tongue every 5 minutes as needed for chest pain 25 tablet 1     pantoprazole (PROTONIX) 40 MG EC tablet Take 1 tablet (40 mg) by mouth daily Take 30-60 minutes before a meal. 30 tablet 0     vitamin C (ASCORBIC ACID) 1000 MG TABS Take 1,000 mg by mouth daily       Vitamin D3 (CHOLECALCIFEROL) 125 MCG (5000 UT) tablet Take 1 tablet by mouth daily       zinc gluconate 30 MG TABS Take 50 mg by mouth daily         ALLERGIES     Allergies   Allergen Reactions     Dust Mites      Grass      Trees        PAST MEDICAL HISTORY:  Past Medical History:   Diagnosis Date     CAD (coronary artery disease) 2/15    Stent to LAD 01/2015     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      Hyperlipidemia      Ischemic cardiomyopathy      NSTEMI (non-ST elevated myocardial infarction) (H) 2/15     S/P coronary angiogram 10-5-2015    patent prev placed stent-aggressive medical management     Stress incontinence      Syncope        PAST SURGICAL HISTORY:  Past Surgical History:   Procedure Laterality Date     ANGIOGRAM       ANGIOGRAM  2019     C DEXA, BONE DENSITY, AXIAL SKEL       CHOLECYSTECTOMY       COLONOSCOPY       CV CORONARY ANGIOGRAM N/A 5/31/2022    Procedure: Coronary Angiogram;  Surgeon: Roderick Francisco MD;  Location:  HEART CARDIAC CATH LAB     CV LEFT HEART CATH N/A 5/31/2022    Procedure: Left Heart Catheterization;  Surgeon: Roderick Francisco MD;  Location:  HEART CARDIAC CATH LAB     CV PCI N/A 5/31/2022    Procedure: Percutaneous Coronary Intervention;  Surgeon: Roderick Francisco MD;  Location:  HEART CARDIAC CATH LAB     HEART CATH, ANGIOPLASTY  01/27/2015     mid LAD-2.75 X 28 mm Promus premier FAM      HYSTERECTOMY VAGINAL N/A 12/19/2014    Procedure: HYSTERECTOMY VAGINAL;  Surgeon: Aarti Ramirez MD;  Location:  OR     wisdom teeth         FAMILY HISTORY:  Family History    Problem Relation Age of Onset     Dementia Mother      Myocardial Infarction Father 34        MI     Hypertension Brother      Hypertension Brother        SOCIAL HISTORY:  Social History     Socioeconomic History     Marital status:    Tobacco Use     Smoking status: Never Smoker     Smokeless tobacco: Never Used   Substance and Sexual Activity     Alcohol use: Yes     Comment: 4 drinks per month     Drug use: No   Other Topics Concern     Caffeine Concern No     Comment: cup of tea, no coffee     Sleep Concern Yes     Comment: takes OTC med     Stress Concern No     Weight Concern No     Special Diet Yes     Comment: LOW FAT, low sugar     Exercise Yes     Comment:  4 times week, brisk walking treadmill, elyptical     Seat Belt Yes     Parent/sibling w/ CABG, MI or angioplasty before 65F 55M? Yes       Review of Systems:  Skin:          Eyes:         ENT:         Respiratory:          Cardiovascular:         Gastroenterology:        Genitourinary:         Musculoskeletal:         Neurologic:         Psychiatric:         Heme/Lymph/Imm:         Endocrine:           Physical Exam:  Vitals: LMP 08/20/2014 (Exact Date)     Constitutional:  cooperative, alert and oriented, well developed, well nourished, in no acute distress        Skin:  warm and dry to the touch, no apparent skin lesions or masses noted          Head:  normocephalic, no masses or lesions        Eyes:  pupils equal and round        Lymph:      ENT:  no pallor or cyanosis, dentition good        Neck:  JVP normal        Respiratory:  clear to auscultation         Cardiac: regular rhythm                pulses full and equal                                        GI:  abdomen soft        Extremities and Muscular Skeletal:  no edema              Neurological:  no gross motor deficits        Psych:  Alert and Oriented x 3        CC  Adelina Maya PA-C  [id number]  [place] [id number]

## 2022-09-19 ENCOUNTER — OFFICE VISIT (OUTPATIENT)
Dept: CARDIOLOGY | Facility: CLINIC | Age: 62
End: 2022-09-19
Attending: PHYSICIAN ASSISTANT
Payer: COMMERCIAL

## 2022-09-19 VITALS
BODY MASS INDEX: 19.61 KG/M2 | HEIGHT: 66 IN | HEART RATE: 70 BPM | WEIGHT: 122 LBS | SYSTOLIC BLOOD PRESSURE: 127 MMHG | DIASTOLIC BLOOD PRESSURE: 73 MMHG

## 2022-09-19 DIAGNOSIS — R00.2 PALPITATIONS: ICD-10-CM

## 2022-09-19 DIAGNOSIS — I25.10 CORONARY ARTERY DISEASE INVOLVING NATIVE CORONARY ARTERY OF NATIVE HEART WITHOUT ANGINA PECTORIS: ICD-10-CM

## 2022-09-19 PROCEDURE — 99214 OFFICE O/P EST MOD 30 MIN: CPT | Performed by: INTERNAL MEDICINE

## 2022-09-19 RX ORDER — LISINOPRIL 2.5 MG/1
2.5 TABLET ORAL DAILY
Qty: 90 TABLET | Refills: 3 | Status: SHIPPED | OUTPATIENT
Start: 2022-09-19 | End: 2023-09-07

## 2022-09-19 RX ORDER — NITROGLYCERIN 0.4 MG/1
0.4 TABLET SUBLINGUAL EVERY 5 MIN PRN
Qty: 25 TABLET | Refills: 1 | Status: SHIPPED | OUTPATIENT
Start: 2022-09-19 | End: 2024-04-17

## 2022-09-19 RX ORDER — METOPROLOL SUCCINATE 25 MG/1
12.5 TABLET, EXTENDED RELEASE ORAL DAILY
Qty: 30 TABLET | Refills: 1 | Status: SHIPPED | OUTPATIENT
Start: 2022-09-19 | End: 2023-01-13

## 2022-09-19 RX ORDER — ATORVASTATIN CALCIUM 40 MG/1
40 TABLET, FILM COATED ORAL DAILY
Qty: 90 TABLET | Refills: 3 | Status: SHIPPED | OUTPATIENT
Start: 2022-09-19 | End: 2022-11-21

## 2022-09-19 NOTE — LETTER
9/19/2022    Chuyita Nobles MD  [facility] [address]  [facility] [address]    RE: Glenny Wadsworthersen       Dear Colleague,     I had the pleasure of seeing Glenny Butts in the [facility].  HPI and Plan:     I had the pleasure of seeing Ms. Butts in followup at [facility] today.  She is a very pleasant 61-year-old female who I last saw in 09/2021.  She has a history of coronary artery disease and is status post non-ST elevation myocardial infarction for which she underwent PCI to the LAD in 2015.  She underwent repeat coronary angiography in 2019 for chest discomfort and was found to have a widely patent stent in the LAD without evidence of restenosis.  Ostial narrowing of the first diagonal branch in the 80%-90% range was noted but unchanged compared to her prior angiogram.  Mild-to-moderate nonobstructive disease was noted elsewhere and medical therapy was recommended.      The patient presents today after recent evaluation for chest fluttering and arm discomfort that prompted a stress echocardiogram and May of this year.  This demonstrated severe hypokinesis of the mid to distal anterolateral wall and apex of the left ventricle, although she was able to exercise 11 minutes without any symptoms.    Given these findings, I referred her for coronary angiography which was performed on 5/31/2022.  She was found to have essentially stable findings compared to her prior angiogram with a patent LAD stent and significant disease involving the diagonal as previously described.  Medical therapy was recommended, and a subsequent Zio patch monitor demonstrated short runs of SVT/nonsustained ventricular tachycardia.  She also underwent an echocardiogram on 9/16/2022 which demonstrated low normal left ventricular systolic function.    Today she feels well overall from a cardiovascular standpoint.  She has been walking 5 days a week on the treadmill for  approximately 30 minutes without any limitations.  Specifically she denies any exertional chest discomfort, palpitations, syncope or presyncope.       PHYSICAL EXAMINATION:  Dictated below.      LABORATORY STUDIES:  Lipids on 9/16/2022 demonstrate total cholesterol 132 HDL of 84 LDL of 42 and triglycerides of 32.     IMPRESSION:      1. Coronary artery disease status post PCI to the LAD in 2015 as described above.   2.  Recent chest fluttering and an abnormal stress echocardiogram that triggered coronary angiography with findings as described above.  3.  Dyslipidemia.  Under excellent control on atorvastatin 40 mg daily.    I went over the patient's recent coronary angiogram, echocardiogram and Zio patch monitor findings with her in detail.  In the absence of symptoms, I think that continued medical therapy for coronary artery disease is appropriate.  I would like to optimize her therapy further with the addition of metoprolol XL 12.5 mg daily.    Of note, she was on metoprolol and 2017 but this was discontinued after syncopal episode and sinus bradycardia noted on event monitoring.  I will obtain a follow-up 24-hour Holter monitor for reassessment in this regard once she starts metoprolol.    It was a pleasure seeing her today.  Assuming her clinical assessment stable, I will plan a follow-up in approximately 6 months.                CURRENT MEDICATIONS:  Current Outpatient Medications   Medication Sig Dispense Refill     acetaminophen (TYLENOL) 325 MG tablet Take 325-650 mg by mouth every 6 hours as needed for mild pain       ASPIRIN PO Take 81 mg by mouth daily       atorvastatin (LIPITOR) 40 MG tablet Take 1 tablet (40 mg) by mouth daily 90 tablet 3     Calcium-Magnesium-Vitamin D (CALCIUM 1200+D3 PO) Take 1 tablet by mouth daily 1,000 units D3       Cyanocobalamin (VITAMIN B-12) 5000 MCG LOZG Take 5,000 mcg by mouth daily       DiphenhydrAMINE HCl (BENADRYL PO) Take 25 mg by mouth as needed Reported on  2/17/2017       doxylamine (UNISOM) 25 MG TABS Take 12.5 mg by mouth nightly as needed        lisinopril (ZESTRIL) 2.5 MG tablet Take 1 tablet (2.5 mg) by mouth daily 90 tablet 3     magnesium oxide 400 MG CAPS        melatonin 5 MG CAPS Take by mouth daily       nitroGLYcerin (NITROSTAT) 0.4 MG sublingual tablet Place 1 tablet (0.4 mg) under the tongue every 5 minutes as needed for chest pain 25 tablet 1     pantoprazole (PROTONIX) 40 MG EC tablet Take 1 tablet (40 mg) by mouth daily Take 30-60 minutes before a meal. 30 tablet 0     vitamin C (ASCORBIC ACID) 1000 MG TABS Take 1,000 mg by mouth daily       Vitamin D3 (CHOLECALCIFEROL) 125 MCG (5000 UT) tablet Take 1 tablet by mouth daily       zinc gluconate 30 MG TABS Take 50 mg by mouth daily         ALLERGIES     Allergies   Allergen Reactions     Dust Mites      Grass      Trees        PAST MEDICAL HISTORY:  Past Medical History:   Diagnosis Date     CAD (coronary artery disease) 2/15    Stent to LAD 01/2015     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      Hyperlipidemia      Ischemic cardiomyopathy      NSTEMI (non-ST elevated myocardial infarction) (H) 2/15     S/P coronary angiogram 10-5-2015    patent prev placed stent-aggressive medical management     Stress incontinence      Syncope        PAST SURGICAL HISTORY:  Past Surgical History:   Procedure Laterality Date     ANGIOGRAM       ANGIOGRAM  2019     C DEXA, BONE DENSITY, AXIAL SKEL       CHOLECYSTECTOMY       COLONOSCOPY       CV CORONARY ANGIOGRAM N/A 5/31/2022    Procedure: Coronary Angiogram;  Surgeon: Roderick Francisco MD;  Location:  HEART CARDIAC CATH LAB     CV LEFT HEART CATH N/A 5/31/2022    Procedure: Left Heart Catheterization;  Surgeon: Roderick Francisco MD;  Location:  HEART CARDIAC CATH LAB     CV PCI N/A 5/31/2022    Procedure: Percutaneous Coronary Intervention;  Surgeon: Roderick Francisco MD;  Location:  HEART CARDIAC CATH LAB     HEART CATH, ANGIOPLASTY  01/27/2015      mid LAD-2.75 X 28 mm Promus premier FAM      HYSTERECTOMY VAGINAL N/A 12/19/2014    Procedure: HYSTERECTOMY VAGINAL;  Surgeon: Aarti Ramirez MD;  Location: [facility] OR     wisdom teeth         FAMILY HISTORY:  Family History   Problem Relation Age of Onset     Dementia Mother      Myocardial Infarction Father 34        MI     Hypertension Brother      Hypertension Brother        SOCIAL HISTORY:  Social History     Socioeconomic History     Marital status:    Tobacco Use     Smoking status: Never Smoker     Smokeless tobacco: Never Used   Substance and Sexual Activity     Alcohol use: Yes     Comment: 4 drinks per month     Drug use: No   Other Topics Concern     Caffeine Concern No     Comment: cup of tea, no coffee     Sleep Concern Yes     Comment: takes OTC med     Stress Concern No     Weight Concern No     Special Diet Yes     Comment: LOW FAT, low sugar     Exercise Yes     Comment:  4 times week, brisk walking treadmill, elyptical     Seat Belt Yes     Parent/sibling w/ CABG, MI or angioplasty before 65F 55M? Yes       Review of Systems:  Skin:          Eyes:         ENT:         Respiratory:          Cardiovascular:         Gastroenterology:        Genitourinary:         Musculoskeletal:         Neurologic:         Psychiatric:         Heme/Lymph/Imm:         Endocrine:           Physical Exam:  Vitals: LMP 08/20/2014 (Exact Date)     Constitutional:  cooperative, alert and oriented, well developed, well nourished, in no acute distress        Skin:  warm and dry to the touch, no apparent skin lesions or masses noted          Head:  normocephalic, no masses or lesions        Eyes:  pupils equal and round        Lymph:      ENT:  no pallor or cyanosis, dentition good        Neck:  JVP normal        Respiratory:  clear to auscultation         Cardiac: regular rhythm                pulses full and equal                                        GI:  abdomen soft        Extremities and Muscular Skeletal:   no edema              Neurological:  no gross motor deficits        Psych:  Alert and Oriented x 3        CC  Adelina Maya, PA-C  [address]  [place] [address]          Thank you for allowing me to participate in the care of your patient.      Sincerely,     Radha Meier MD     [facility]

## 2022-09-28 ENCOUNTER — HOSPITAL ENCOUNTER (OUTPATIENT)
Dept: CARDIOLOGY | Facility: CLINIC | Age: 62
Discharge: HOME | End: 2022-09-28
Attending: INTERNAL MEDICINE | Admitting: INTERNAL MEDICINE
Payer: COMMERCIAL

## 2022-09-28 DIAGNOSIS — I25.10 CORONARY ARTERY DISEASE INVOLVING NATIVE CORONARY ARTERY OF NATIVE HEART WITHOUT ANGINA PECTORIS: ICD-10-CM

## 2022-09-28 PROCEDURE — 93226 XTRNL ECG REC<48 HR SCAN A/R: CPT

## 2022-09-28 PROCEDURE — 93227 XTRNL ECG REC<48 HR R&I: CPT | Performed by: INTERNAL MEDICINE

## 2022-10-06 ENCOUNTER — TELEPHONE (OUTPATIENT)
Dept: CARDIOLOGY | Facility: CLINIC | Age: 62
End: 2022-10-06

## 2022-10-06 NOTE — TELEPHONE ENCOUNTER
24 hr Holter results started 9-28-22.    The predominant rhythm is sinus with sinus arrhythmia. During this time the average HR was 64 bpm with a minimum HR of 44 beats at 4:14 AM and a maximum HR of 129 bpm at 8:14 AM. The AK interval measured .13-.18 sec., the QRS measured .09 sec., and the QT varied with R to R interval .26-.48 sec. 2. 27 Isolated PVCs. 3. 61 Supraventricular ectopic beats with 4 paired beats, and 2 runs. The longest run was 7 beats at 5:05:22 AM with a rate of 119 bpm. 4. No symptoms were identified.    Last Dr. Meier OV 9-19-22 for palpitations - obtain a follow-up 24-hour Holter monitor for reassessment in this regard once she starts metoprolol.  Currently on Metoprolol succinate 12.5 mg daily.     Next Dr. Meier OV 3-26-23.   Dr. Meier to review.

## 2022-10-10 ENCOUNTER — TELEPHONE (OUTPATIENT)
Dept: CARDIOLOGY | Facility: CLINIC | Age: 62
End: 2022-10-10

## 2022-10-10 NOTE — TELEPHONE ENCOUNTER
24 Hr Holter - started 9-28-22.   - predominant rhythm is sinus with sinus arrhythmia.   - average HR was 64 bpm,  range  of 44 beats to  129 bpm   - Isolated PVCs.   -. 61 Supraventricular ectopic beats with 4 paired beats, and 2 runs. The longest run was 7 beats at 5:05:22 AM with a rate of 119 bpm. 4  - No symptoms were identified.    Next Dr. Meier OV 3-6-23    Last Dr. Meier OV 9-19-22 for palpitations - Metoprolol succinate .0.5 tablet (12.5 mg daily).    My Chart message was sent 10-6-22.  - no changes at this time

## 2022-10-18 NOTE — PROGRESS NOTES
[facility]  [address] [place] [address]  Phone: [phone]  Fax: [phone]  Primary Provider: Chuyita Coates  Pre-op Performing Provider: CHUYITA COATES      PREOPERATIVE EVALUATION:  Today's date: 10/21/2022    Glenny Butts is a 62 year old female who presents for a preoperative evaluation.  Preoperative Questionnaire:   Yes, 2015 - Have you ever had a heart attack or stroke?  Yes, stent in 2015 - Have you ever had surgery on your heart or blood vessels, such as a stent, coronary (heart) bypass, or surgery on an artery in the head, neck, heart, or legs?  No - Do you have chest pain when you are physically active?  No - Do you have a history of heart failure?  No - Do you currently have a cold, bronchitis, or symptoms of other respiratory (head and chest) infections?  No - Do you have a cough, shortness of breath, or wheezing?  No - Do you or anyone in your family have a history of blood clots?  No - Do you or anyone in your family have a serious bleeding problem, such as long-lasting bleeding after surgeries or cuts?  No - Have you ever had anemia or been told to take iron pills?  No - Have you had any abnormal blood loss such as black, tarry or bloody stools, or abnormal vaginal bleeding?  No - Have you ever had a blood transfusion?  Yes - Are you willing to have a blood transfusion if it is medically needed before, during, or after your surgery?  No - Have you or anyone in your family ever had problems with anesthesia (sedation for surgery)?  No - Do you have sleep apnea, excessive snoring, or daytime drowsiness?   No - Do you have any artifical heart valves or other implanted medical devices, such as a pacemaker, defibrillator, or continuous glucose monitor?  No - Do you have any artifical joints?  No - Are you allergic to latex?  No - Is there any chance that you may be pregnant?      Surgical Information:  Surgery/Procedure: Cataract Extraction  Surgery Location:  [facility], [facility]  Surgeon: Dr Douglas MD  Surgery Date: 10/26/22 and 11/19/22  Time of Surgery: tbd  Where patient plans to recover: At home with family  Fax number for surgical facility: [phone]    Type of Anesthesia Anticipated: Local with MAC    Assessment & Plan     The proposed surgical procedure is considered LOW risk.    Preop general physical exam  Pure hypercholesterolemia  H/O non-ST elevation myocardial infarction (NSTEMI)  -status post non-ST elevation myocardial infarction for which she underwent PCI to the LAD in 2015. Taking metoprolol succinate 25 mg, lisinopril 2.5 mg, atorvastatin 40 mg.       Risks and Recommendations:  The patient has the following additional risks and recommendations for perioperative complications:   - No identified additional risk factors other than previously addressed    Medication Instructions:  Patient is to take all scheduled medications on the day of surgery    RECOMMENDATION:  APPROVAL GIVEN to proceed with proposed procedure, without further diagnostic evaluation.      Subjective     HPI related to upcoming procedure: cataracts    Health Care Directive:  Patient does not have a Health Care Directive or Living Will: Discussed advance care planning with patient; however, patient declined at this time.    Preoperative Review of :   reviewed - no record of controlled substances prescribed.  [id number]}    Status of Chronic Conditions:  See problem list for active medical problems.  Problems all longstanding and stable, except as noted/documented.  See ROS for pertinent symptoms related to these conditions.      Review of Systems  CONSTITUTIONAL: NEGATIVE for fever, chills, change in weight  ENT/MOUTH: NEGATIVE for ear, mouth and throat problems  RESP: NEGATIVE for significant cough or SOB  CV: NEGATIVE for chest pain, palpitations or peripheral edema    Patient Active Problem List    Diagnosis Date Noted     Status post coronary angiogram 05/31/2022      "Priority: Medium     Ischemic cardiomyopathy      Priority: Medium     Chronic gastritis without bleeding, unspecified gastritis type 11/23/2018     Priority: Medium     Abnormal nuclear stress test 10/05/2018     Priority: Medium     Initial NSTEMI presented with typical \"male\" symptoms per pt report. C/o intermittent epigastric discomfort and GERD prompted repeat test in 2018. Pt has appt in Cardiology 10/11/2018.        Osteopenia, unspecified location 09/16/2018     Priority: Medium     DEXA scanned into Epic. Lowest T-score  -1.5 at L hip. Lumbar spine nml at -1.0, but had 15% reduction from prior DEXA. Repeat DEXA due 8/2019.        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      Hyperlipidemia      Ischemic cardiomyopathy      NSTEMI (non-ST elevated myocardial infarction) (H) 2/15     S/P coronary angiogram 10-5-2015    patent prev placed stent-aggressive medical management     Stress incontinence      Syncope      Past Surgical History:   Procedure Laterality Date     ANGIOGRAM       ANGIOGRAM  2019     C DEXA, BONE DENSITY, AXIAL SKEL       CHOLECYSTECTOMY       COLONOSCOPY       CV CORONARY ANGIOGRAM N/A 5/31/2022    Procedure: Coronary Angiogram;  Surgeon: Roderick Francisco MD;  Location:  HEART CARDIAC CATH LAB     CV LEFT HEART CATH N/A 5/31/2022    Procedure: Left Heart Catheterization;  Surgeon: Roderick Francisco MD;  Location: [facility] CARDIAC CATH LAB     CV PCI N/A 5/31/2022    Procedure: " Percutaneous Coronary Intervention;  Surgeon: Roderick Francisco MD;  Location:  HEART CARDIAC CATH LAB     HEART CATH, ANGIOPLASTY  01/27/2015     mid LAD-2.75 X 28 mm Promus premier FAM      HYSTERECTOMY VAGINAL N/A 12/19/2014    Procedure: HYSTERECTOMY VAGINAL;  Surgeon: Aarti Ramirez MD;  Location:  OR     wisdom teeth       Current Outpatient Medications   Medication Sig Dispense Refill     acetaminophen (TYLENOL) 325 MG tablet Take 325-650 mg by mouth every 6 hours as needed for mild pain       ASPIRIN PO Take 81 mg by mouth daily       atorvastatin (LIPITOR) 40 MG tablet Take 1 tablet (40 mg) by mouth daily 90 tablet 3     Calcium-Magnesium-Vitamin D (CALCIUM 1200+D3 PO) Take 1 tablet by mouth daily 1,000 units D3       Cyanocobalamin (VITAMIN B-12) 5000 MCG LOZG Take 5,000 mcg by mouth daily       DiphenhydrAMINE HCl (BENADRYL PO) Take 25 mg by mouth as needed Reported on 2/17/2017       doxylamine (UNISOM) 25 MG TABS Take 12.5 mg by mouth nightly as needed        lisinopril (ZESTRIL) 2.5 MG tablet Take 1 tablet (2.5 mg) by mouth daily 90 tablet 3     magnesium oxide 400 MG CAPS        melatonin 5 MG CAPS Take by mouth daily       metoprolol succinate ER (TOPROL XL) 25 MG 24 hr tablet Take 0.5 tablets (12.5 mg) by mouth daily 30 tablet 1     nitroGLYcerin (NITROSTAT) 0.4 MG sublingual tablet Place 1 tablet (0.4 mg) under the tongue every 5 minutes as needed for chest pain 25 tablet 1     pantoprazole (PROTONIX) 40 MG EC tablet Take 1 tablet (40 mg) by mouth daily Take 30-60 minutes before a meal. 30 tablet 0     vitamin C (ASCORBIC ACID) 1000 MG TABS Take 1,000 mg by mouth daily       Vitamin D3 (CHOLECALCIFEROL) 125 MCG (5000 UT) tablet Take 1 tablet by mouth daily       zinc gluconate 30 MG TABS Take 50 mg by mouth daily         Allergies   Allergen Reactions     Dust Mites      Grass      Trees         Social History     Tobacco Use     Smoking status: Never     Smokeless tobacco: Never  "  Substance Use Topics     Alcohol use: Yes     Comment: 4 drinks per month       Objective     /62   Pulse 74   Temp 97.5  F (36.4  C)   Resp 16   Ht 1.676 m (5' 6\")   Wt 55.3 kg (122 lb)   LMP 08/20/2014 (Exact Date)   SpO2 96%   BMI 19.69 kg/m      Physical Exam  GENERAL APPEARANCE: healthy, alert and no distress  HENT: ear canals and TM's normal and nose and mouth without ulcers or lesions  RESP: lungs clear to auscultation - no rales, rhonchi or wheezes  CV: regular rate and rhythm, normal S1 S2, no S3 or S4 and no murmur, click or rub   NEURO: Normal strength and tone, sensory exam grossly normal, mentation intact and speech normal    Signed Electronically by: Chuyita Nobles MD  Copy of this evaluation report is provided to requesting physician.      "

## 2022-10-18 NOTE — PATIENT INSTRUCTIONS
Preparing for Your Surgery  Getting started  A nurse will call you to review your health history and instructions. They will give you an arrival time based on your scheduled surgery time. Please be ready to share:    Your doctor's clinic name and phone number    Your medical, surgical and anesthesia history    A list of allergies and sensitivities    A list of medicines, including herbal treatments and over-the-counter drugs    Whether the patient has a legal guardian (ask how to send us the papers in advance)  Please tell us if you're pregnant--or if there's any chance you might be pregnant. Some surgeries may injure a fetus (unborn baby), so they require a pregnancy test. Surgeries that are safe for a fetus don't always need a test, and you can choose whether to have one.   If you have a child who's having surgery, please ask for a copy of Preparing for Your Child's Surgery.    Preparing for surgery    Within 10 to 30 days of surgery: Have a pre-op exam (sometimes called an H&P, or History and Physical). This can be done at a clinic or pre-operative center.  ? If you're having a , you may not need this exam. Talk to your care team.    At your pre-op exam, talk to your care team about all medicines you take. If you need to stop any medicines before surgery, ask when to start taking them again.  ? We do this for your safety. Many medicines can make you bleed too much during surgery. Some change how well surgery (anesthesia) drugs work.    Call your insurance company to let them know you're having surgery. (If you don't have insurance, call [phone].)    Call your clinic if there's any change in your health. This includes signs of a cold or flu (sore throat, runny nose, cough, rash, fever). It also includes a scrape or scratch near the surgery site.    If you have questions on the day of surgery, call your hospital or surgery center.  COVID testing  You may need to be tested for COVID-19 before having  surgery. If so, we will give you instructions (or click here).  Eating and drinking guidelines  For your safety: Unless your surgeon tells you otherwise, follow the guidelines below.    Eat and drink as usual until 8 hours before surgery. After that, no food or milk.    Drink clear liquids until 2 hours before surgery. These are liquids you can see through, like water, Gatorade and Propel Water. You may also have black coffee and tea (no cream or milk).    Nothing by mouth within 2 hours of surgery. This includes gum, candy and breath mints.    If you drink alcohol: Stop drinking it the night before surgery.    If your care team tells you to take medicine on the morning of surgery, it's okay to take it with a sip of water.  Preventing infection    Shower or bathe the night before and morning of your surgery. Follow the instructions your clinic gave you. (If no instructions, use regular soap.)    Don't shave or clip hair near your surgery site. We'll remove the hair if needed.    Don't smoke or vape the morning of surgery. You may chew nicotine gum up to 2 hours before surgery. A nicotine patch is okay.  ? Note: Some surgeries require you to completely quit smoking and nicotine. Check with your surgeon.    Your care team will make every effort to keep you safe from infection. We will:  ? Clean our hands often with soap and water (or an alcohol-based hand rub).  ? Clean the skin at your surgery site with a special soap that kills germs.  ? Give you a special gown to keep you warm. (Cold raises the risk of infection.)  ? Wear special hair covers, masks, gowns and gloves during surgery.  ? Give antibiotic medicine, if prescribed. Not all surgeries need antibiotics.  What to bring on the day of surgery    Photo ID and insurance card    Copy of your health care directive, if you have one    Glasses and hearing aides (bring cases)  ? You can't wear contacts during surgery    Inhaler and eye drops, if you use them (tell us  about these when you arrive)    CPAP machine or breathing device, if you use them    A few personal items, if spending the night    If you have . . .  ? A pacemaker, ICD (cardiac defibrillator) or other implant: Bring the ID card.  ? An implanted stimulator: Bring the remote control.  ? A legal guardian: Bring a copy of the certified (court-stamped) guardianship papers.  Please remove any jewelry, including body piercings. Leave jewelry and other valuables at home.  If you're going home the day of surgery    You must have a responsible adult drive you home. They should stay with you overnight as well.    If you don't have someone to stay with you, and you aren't safe to go home alone, we may keep you overnight. Insurance often won't pay for this.  After surgery  If it's hard to control your pain or you need more pain medicine, please call your surgeon's office.  Questions?   If you have any questions for your care team, list them here: _________________________________________________________________________________________________________________________________________________________________________ ____________________________________ ____________________________________ ____________________________________  For informational purposes only. Not to replace the advice of your health care provider. Copyright   2003, 2019 [facility]. All rights reserved. Clinically reviewed by Zita Caceres MD. [employer] [id number] - REV 07/22.

## 2022-10-21 ENCOUNTER — OFFICE VISIT (OUTPATIENT)
Dept: FAMILY MEDICINE | Facility: CLINIC | Age: 62
End: 2022-10-21

## 2022-10-21 VITALS
RESPIRATION RATE: 16 BRPM | SYSTOLIC BLOOD PRESSURE: 102 MMHG | TEMPERATURE: 97.5 F | HEIGHT: 66 IN | DIASTOLIC BLOOD PRESSURE: 62 MMHG | BODY MASS INDEX: 19.61 KG/M2 | WEIGHT: 122 LBS | HEART RATE: 74 BPM | OXYGEN SATURATION: 96 %

## 2022-10-21 DIAGNOSIS — I25.2 H/O NON-ST ELEVATION MYOCARDIAL INFARCTION (NSTEMI): ICD-10-CM

## 2022-10-21 DIAGNOSIS — Z01.818 PREOP GENERAL PHYSICAL EXAM: Primary | ICD-10-CM

## 2022-10-21 DIAGNOSIS — E78.00 PURE HYPERCHOLESTEROLEMIA: ICD-10-CM

## 2022-10-21 PROBLEM — Z98.890 STATUS POST CORONARY ANGIOGRAM: Status: RESOLVED | Noted: 2022-05-31 | Resolved: 2022-10-21

## 2022-10-21 PROBLEM — R94.39 ABNORMAL NUCLEAR STRESS TEST: Status: RESOLVED | Noted: 2018-10-05 | Resolved: 2022-10-21

## 2022-10-21 PROCEDURE — 99214 OFFICE O/P EST MOD 30 MIN: CPT | Performed by: FAMILY MEDICINE

## 2022-10-21 RX ORDER — FLUCONAZOLE 150 MG/1
TABLET ORAL
COMMUNITY
Start: 2022-05-04 | End: 2022-10-21

## 2022-10-21 RX ORDER — PANTOPRAZOLE SODIUM 40 MG/1
TABLET, DELAYED RELEASE ORAL EVERY 24 HOURS
COMMUNITY
Start: 2022-05-13

## 2022-10-21 RX ORDER — NITROFURANTOIN 25; 75 MG/1; MG/1
CAPSULE ORAL
COMMUNITY
Start: 2022-04-05 | End: 2022-10-21

## 2022-10-21 RX ORDER — CLOBETASOL PROPIONATE 0.5 MG/G
CREAM TOPICAL
COMMUNITY
Start: 2022-01-18 | End: 2022-10-21

## 2022-10-21 RX ORDER — FAMOTIDINE 40 MG/1
TABLET, FILM COATED ORAL EVERY 24 HOURS
COMMUNITY
Start: 2021-05-21 | End: 2022-10-21

## 2022-10-21 RX ORDER — ESTRADIOL 0.04 MG/D
PATCH, EXTENDED RELEASE TRANSDERMAL
COMMUNITY
Start: 2021-11-23 | End: 2022-10-21

## 2022-10-21 RX ORDER — DOXYCYCLINE 100 MG/1
CAPSULE ORAL
COMMUNITY
Start: 2021-12-22 | End: 2022-10-21

## 2022-10-30 NOTE — PROGRESS NOTES
10/21/22 faxed this preop to [facility] @ [phone]    Adeel Lu,   [facility]  [phone].

## 2022-11-21 ENCOUNTER — MYC MEDICAL ADVICE (OUTPATIENT)
Dept: CARDIOLOGY | Facility: CLINIC | Age: 62
End: 2022-11-21

## 2022-11-21 DIAGNOSIS — I25.10 CORONARY ARTERY DISEASE INVOLVING NATIVE CORONARY ARTERY OF NATIVE HEART WITHOUT ANGINA PECTORIS: ICD-10-CM

## 2022-11-21 RX ORDER — ATORVASTATIN CALCIUM 40 MG/1
40 TABLET, FILM COATED ORAL DAILY
Qty: 90 TABLET | Refills: 3 | Status: SHIPPED | OUTPATIENT
Start: 2022-11-21 | End: 2024-02-07

## 2022-12-26 ENCOUNTER — HEALTH MAINTENANCE LETTER (OUTPATIENT)
Age: 62
End: 2022-12-26

## 2023-01-13 DIAGNOSIS — I25.10 CORONARY ARTERY DISEASE INVOLVING NATIVE CORONARY ARTERY OF NATIVE HEART WITHOUT ANGINA PECTORIS: ICD-10-CM

## 2023-01-13 RX ORDER — METOPROLOL SUCCINATE 25 MG/1
12.5 TABLET, EXTENDED RELEASE ORAL DAILY
Qty: 45 TABLET | Refills: 2 | Status: SHIPPED | OUTPATIENT
Start: 2023-01-13 | End: 2023-10-23

## 2023-01-15 NOTE — TELEPHONE ENCOUNTER
Can you please have the patient come in and see EP Cards ASAP.  She was seen by Dr Echevarria in the hospital.    Janes Camarillo MD    
Statement Selected

## 2023-03-04 NOTE — PROGRESS NOTES
HPI and Plan:     I had the pleasure of seeing Ms. Butts in followup at [facility] today.  She is a very pleasant 62-year-old female who I last saw in 09/2022.      She has a history of coronary artery disease and is status post non-ST elevation myocardial infarction for which she underwent PCI to the LAD in 2015.  She underwent repeat coronary angiography in 2019 for chest discomfort and was found to have a widely patent stent in the LAD without evidence of restenosis.  Ostial narrowing of the first diagonal branch in the 80%-90% range was noted but unchanged compared to her prior angiogram    Most recently she underwent coronary angiography on 5/31/2022 after a stress echocardiogram performed for arm discomfort demonstrated severe hypokinesis of the mid to distal anterolateral wall and apex of the left ventricle, although she was able to exercise 11 minutes without any symptoms.    She was found to have essentially stable findings compared to her prior angiogram with a patent LAD stent and significant disease involving the diagonal as previously described.  Medical therapy was recommended, and a subsequent Zio patch monitor demonstrated short runs of SVT/nonsustained ventricular tachycardia.  She also underwent an echocardiogram on 9/16/2022 which demonstrated low normal left ventricular systolic function.    Today she feels well overall from a cardiovascular standpoint.  She has been walking 5 days a week on the treadmill for approximately 30 minutes without any limitations.  Specifically she denies any exertional chest discomfort, palpitations, syncope or presyncope.  She also works 5 days a week.    She did have some questions regarding her ejection fraction which has varied depending on the echocardiographic interpretation.  In addition, she was curious as to the etiology of her coronary artery disease given that she has essentially no risk factors with the exception of a  strong family history.       PHYSICAL EXAMINATION:  Dictated below.      LABORATORY STUDIES:  Lipids on 9/16/2022 demonstrate total cholesterol 132 HDL of 84 LDL of 42 and triglycerides of 32.     IMPRESSION:      1. Coronary artery disease status post PCI to the LAD in 2015 as described above.  She is known to have significant disease involving the first diagonal which was thought to be appropriate for medical management.  2.  Recent coronary angiography performed for an abnormal stress echocardiogram in May 2022 which demonstrated unchanged anatomy.  3.  Dyslipidemia.  Under excellent control on atorvastatin 40 mg daily.    Ms. Garay is doing well overall from a cardiovascular standpoint.  There is no evidence of angina or congestive heart failure.  Her medications are appropriate and I will make no changes today.    I personally reviewed her most recent echocardiogram and on my review left ventricular systolic function is within normal limits.  We also discussed testing for risk enhancers such as lipoprotein (a).  I think it be reasonable to check for an elevated level since this may have implications for her family.    It was a pleasure seeing her today.              CURRENT MEDICATIONS:  Current Outpatient Medications   Medication Sig Dispense Refill     acetaminophen (TYLENOL) 325 MG tablet Take 325-650 mg by mouth every 6 hours as needed for mild pain       ASPIRIN PO Take 81 mg by mouth daily       atorvastatin (LIPITOR) 40 MG tablet Take 1 tablet (40 mg) by mouth daily 90 tablet 3     Calcium-Magnesium-Vitamin D (CALCIUM 1200+D3 PO) Take 1 tablet by mouth daily 1,000 units D3       doxylamine (UNISOM) 25 MG TABS Take 12.5 mg by mouth nightly as needed        lisinopril (ZESTRIL) 2.5 MG tablet Take 1 tablet (2.5 mg) by mouth daily 90 tablet 3     magnesium oxide 400 MG CAPS        metoprolol succinate ER (TOPROL XL) 25 MG 24 hr tablet Take 0.5 tablets (12.5 mg) by mouth daily 45 tablet 2     nitroGLYcerin  (NITROSTAT) 0.4 MG sublingual tablet Place 1 tablet (0.4 mg) under the tongue every 5 minutes as needed for chest pain 25 tablet 1     pantoprazole (PROTONIX) 40 MG EC tablet Take by mouth every 24 hours       vitamin C (ASCORBIC ACID) 1000 MG TABS Take 1,000 mg by mouth daily       Vitamin D3 (CHOLECALCIFEROL) 125 MCG (5000 UT) tablet Take 1 tablet by mouth daily       zinc gluconate 30 MG TABS Take 50 mg by mouth daily         ALLERGIES     Allergies   Allergen Reactions     Dust Mites      Grass      Nickel      Trees        PAST MEDICAL HISTORY:  Past Medical History:   Diagnosis Date     CAD (coronary artery disease) 2/15    Stent to LAD 01/2015     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      Hyperlipidemia      Ischemic cardiomyopathy      NSTEMI (non-ST elevated myocardial infarction) (H) 2/15     S/P coronary angiogram 10-5-2015    patent prev placed stent-aggressive medical management     Stress incontinence      Syncope        PAST SURGICAL HISTORY:  Past Surgical History:   Procedure Laterality Date     ANGIOGRAM       ANGIOGRAM  2019     C DEXA, BONE DENSITY, AXIAL SKEL       CHOLECYSTECTOMY       COLONOSCOPY       CV CORONARY ANGIOGRAM N/A 5/31/2022    Procedure: Coronary Angiogram;  Surgeon: Roderick Francisco MD;  Location:  HEART CARDIAC CATH LAB     CV LEFT HEART CATH N/A 5/31/2022    Procedure: Left Heart Catheterization;  Surgeon: Roderick Francisco MD;  Location:  HEART CARDIAC CATH LAB     CV PCI N/A 5/31/2022    Procedure: Percutaneous Coronary Intervention;  Surgeon: Roderick Francisco MD;  Location:  HEART CARDIAC CATH LAB     HEART CATH, ANGIOPLASTY  01/27/2015     mid LAD-2.75 X 28 mm Promus premier FAM      HYSTERECTOMY VAGINAL N/A 12/19/2014    Procedure: HYSTERECTOMY VAGINAL;  Surgeon: Aarti Ramirez MD;  Location:  OR     wisdom teeth         FAMILY HISTORY:  Family History   Problem Relation Age of Onset     Dementia Mother      Myocardial Infarction Father 34         MI     Hypertension Brother      Hypertension Brother        SOCIAL HISTORY:  Social History     Socioeconomic History     Marital status:    Tobacco Use     Smoking status: Never     Smokeless tobacco: Never   Substance and Sexual Activity     Alcohol use: Yes     Comment: 4 drinks per month     Drug use: No   Other Topics Concern     Caffeine Concern No     Comment: cup of tea, no coffee     Sleep Concern Yes     Comment: takes OTC med     Stress Concern No     Weight Concern No     Special Diet Yes     Comment: LOW FAT, low sugar     Exercise Yes     Comment:  4 times week, brisk walking treadmill, elyptical     Seat Belt Yes     Parent/sibling w/ CABG, MI or angioplasty before 65F 55M? Yes       Review of Systems:  Skin:          Eyes:         ENT:         Respiratory:          Cardiovascular:         Gastroenterology:        Genitourinary:         Musculoskeletal:         Neurologic:         Psychiatric:         Heme/Lymph/Imm:         Endocrine:           Physical Exam:  Vitals: LMP 08/20/2014 (Exact Date)     Constitutional:  cooperative, alert and oriented, well developed, well nourished, in no acute distress        Skin:  warm and dry to the touch, no apparent skin lesions or masses noted          Head:  normocephalic, no masses or lesions        Eyes:           Lymph:      ENT:  no pallor or cyanosis        Neck:           Respiratory:  clear to auscultation         Cardiac: regular rhythm                pulses full and equal                                        GI:           Extremities and Muscular Skeletal:  no edema              Neurological:  no gross motor deficits        Psych:  Alert and Oriented x 3        CC  Radha Meier MD  [id number]  [place] [id number]

## 2023-03-06 ENCOUNTER — OFFICE VISIT (OUTPATIENT)
Dept: CARDIOLOGY | Facility: CLINIC | Age: 63
End: 2023-03-06
Payer: COMMERCIAL

## 2023-03-06 ENCOUNTER — LAB (OUTPATIENT)
Dept: LAB | Facility: CLINIC | Age: 63
End: 2023-03-06
Payer: COMMERCIAL

## 2023-03-06 VITALS
SYSTOLIC BLOOD PRESSURE: 120 MMHG | HEIGHT: 66 IN | HEART RATE: 55 BPM | WEIGHT: 122 LBS | DIASTOLIC BLOOD PRESSURE: 74 MMHG | BODY MASS INDEX: 19.61 KG/M2

## 2023-03-06 DIAGNOSIS — I25.10 CORONARY ARTERY DISEASE INVOLVING NATIVE CORONARY ARTERY OF NATIVE HEART WITHOUT ANGINA PECTORIS: ICD-10-CM

## 2023-03-06 LAB
APO A-I SERPL-MCNC: 51 MG/DL
CHOLEST SERPL-MCNC: 153 MG/DL
HDLC SERPL-MCNC: 81 MG/DL
LDLC SERPL CALC-MCNC: 64 MG/DL
NONHDLC SERPL-MCNC: 72 MG/DL
TRIGL SERPL-MCNC: 42 MG/DL

## 2023-03-06 PROCEDURE — 99214 OFFICE O/P EST MOD 30 MIN: CPT | Performed by: INTERNAL MEDICINE

## 2023-03-06 PROCEDURE — 80061 LIPID PANEL: CPT | Performed by: INTERNAL MEDICINE

## 2023-03-06 PROCEDURE — 83695 ASSAY OF LIPOPROTEIN(A): CPT | Performed by: INTERNAL MEDICINE

## 2023-03-06 PROCEDURE — 36415 COLL VENOUS BLD VENIPUNCTURE: CPT | Performed by: INTERNAL MEDICINE

## 2023-03-06 NOTE — LETTER
3/6/2023    Chuyita Nobles MD  [address]  [facility] [address]    RE: Glenny Butts       Dear Colleague,     I had the pleasure of seeing Glenny Butts in the [facility].  HPI and Plan:     I had the pleasure of seeing Ms. Butts in followup at [facility] today.  She is a very pleasant 62-year-old female who I last saw in 09/2022.      She has a history of coronary artery disease and is status post non-ST elevation myocardial infarction for which she underwent PCI to the LAD in 2015.  She underwent repeat coronary angiography in 2019 for chest discomfort and was found to have a widely patent stent in the LAD without evidence of restenosis.  Ostial narrowing of the first diagonal branch in the 80%-90% range was noted but unchanged compared to her prior angiogram    Most recently she underwent coronary angiography on 5/31/2022 after a stress echocardiogram performed for arm discomfort demonstrated severe hypokinesis of the mid to distal anterolateral wall and apex of the left ventricle, although she was able to exercise 11 minutes without any symptoms.    She was found to have essentially stable findings compared to her prior angiogram with a patent LAD stent and significant disease involving the diagonal as previously described.  Medical therapy was recommended, and a subsequent Zio patch monitor demonstrated short runs of SVT/nonsustained ventricular tachycardia.  She also underwent an echocardiogram on 9/16/2022 which demonstrated low normal left ventricular systolic function.    Today she feels well overall from a cardiovascular standpoint.  She has been walking 5 days a week on the treadmill for approximately 30 minutes without any limitations.  Specifically she denies any exertional chest discomfort, palpitations, syncope or presyncope.  She also works 5 days a week.    She did have some questions regarding her ejection fraction which has varied  depending on the echocardiographic interpretation.  In addition, she was curious as to the etiology of her coronary artery disease given that she has essentially no risk factors with the exception of a strong family history.       PHYSICAL EXAMINATION:  Dictated below.      LABORATORY STUDIES:  Lipids on 9/16/2022 demonstrate total cholesterol 132 HDL of 84 LDL of 42 and triglycerides of 32.     IMPRESSION:      1. Coronary artery disease status post PCI to the LAD in 2015 as described above.  She is known to have significant disease involving the first diagonal which was thought to be appropriate for medical management.  2.  Recent coronary angiography performed for an abnormal stress echocardiogram in May 2022 which demonstrated unchanged anatomy.  3.  Dyslipidemia.  Under excellent control on atorvastatin 40 mg daily.    Ms. Garay is doing well overall from a cardiovascular standpoint.  There is no evidence of angina or congestive heart failure.  Her medications are appropriate and I will make no changes today.    I personally reviewed her most recent echocardiogram and on my review left ventricular systolic function is within normal limits.  We also discussed testing for risk enhancers such as lipoprotein (a).  I think it be reasonable to check for an elevated level since this may have implications for her family.    It was a pleasure seeing her today.              CURRENT MEDICATIONS:  Current Outpatient Medications   Medication Sig Dispense Refill     acetaminophen (TYLENOL) 325 MG tablet Take 325-650 mg by mouth every 6 hours as needed for mild pain       ASPIRIN PO Take 81 mg by mouth daily       atorvastatin (LIPITOR) 40 MG tablet Take 1 tablet (40 mg) by mouth daily 90 tablet 3     Calcium-Magnesium-Vitamin D (CALCIUM 1200+D3 PO) Take 1 tablet by mouth daily 1,000 units D3       doxylamine (UNISOM) 25 MG TABS Take 12.5 mg by mouth nightly as needed        lisinopril (ZESTRIL) 2.5 MG tablet Take 1 tablet  (2.5 mg) by mouth daily 90 tablet 3     magnesium oxide 400 MG CAPS        metoprolol succinate ER (TOPROL XL) 25 MG 24 hr tablet Take 0.5 tablets (12.5 mg) by mouth daily 45 tablet 2     nitroGLYcerin (NITROSTAT) 0.4 MG sublingual tablet Place 1 tablet (0.4 mg) under the tongue every 5 minutes as needed for chest pain 25 tablet 1     pantoprazole (PROTONIX) 40 MG EC tablet Take by mouth every 24 hours       vitamin C (ASCORBIC ACID) 1000 MG TABS Take 1,000 mg by mouth daily       Vitamin D3 (CHOLECALCIFEROL) 125 MCG (5000 UT) tablet Take 1 tablet by mouth daily       zinc gluconate 30 MG TABS Take 50 mg by mouth daily         ALLERGIES     Allergies   Allergen Reactions     Dust Mites      Grass      Nickel      Trees        PAST MEDICAL HISTORY:  Past Medical History:   Diagnosis Date     CAD (coronary artery disease) 2/15    Stent to LAD 01/2015     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      Hyperlipidemia      Ischemic cardiomyopathy      NSTEMI (non-ST elevated myocardial infarction) (H) 2/15     S/P coronary angiogram 10-5-2015    patent prev placed stent-aggressive medical management     Stress incontinence      Syncope        PAST SURGICAL HISTORY:  Past Surgical History:   Procedure Laterality Date     ANGIOGRAM       ANGIOGRAM  2019     C DEXA, BONE DENSITY, AXIAL SKEL       CHOLECYSTECTOMY       COLONOSCOPY       CV CORONARY ANGIOGRAM N/A 5/31/2022    Procedure: Coronary Angiogram;  Surgeon: Roderick Francisco MD;  Location:  HEART CARDIAC CATH LAB     CV LEFT HEART CATH N/A 5/31/2022    Procedure: Left Heart Catheterization;  Surgeon: Roderick Francisco MD;  Location:  HEART CARDIAC CATH LAB     CV PCI N/A 5/31/2022    Procedure: Percutaneous Coronary Intervention;  Surgeon: Roderick Francisco MD;  Location:  HEART CARDIAC CATH LAB     HEART CATH, ANGIOPLASTY  01/27/2015     mid LAD-2.75 X 28 mm Promus premier FAM      HYSTERECTOMY VAGINAL N/A 12/19/2014    Procedure: HYSTERECTOMY  VAGINAL;  Surgeon: Aarti Ramirez MD;  Location: [facility] OR     wisdom teeth         FAMILY HISTORY:  Family History   Problem Relation Age of Onset     Dementia Mother      Myocardial Infarction Father 34        MI     Hypertension Brother      Hypertension Brother        SOCIAL HISTORY:  Social History     Socioeconomic History     Marital status:    Tobacco Use     Smoking status: Never     Smokeless tobacco: Never   Substance and Sexual Activity     Alcohol use: Yes     Comment: 4 drinks per month     Drug use: No   Other Topics Concern     Caffeine Concern No     Comment: cup of tea, no coffee     Sleep Concern Yes     Comment: takes OTC med     Stress Concern No     Weight Concern No     Special Diet Yes     Comment: LOW FAT, low sugar     Exercise Yes     Comment:  4 times week, brisk walking treadmill, elyptical     Seat Belt Yes     Parent/sibling w/ CABG, MI or angioplasty before 65F 55M? Yes       Review of Systems:  Skin:          Eyes:         ENT:         Respiratory:          Cardiovascular:         Gastroenterology:        Genitourinary:         Musculoskeletal:         Neurologic:         Psychiatric:         Heme/Lymph/Imm:         Endocrine:           Physical Exam:  Vitals: LMP 08/20/2014 (Exact Date)     Constitutional:  cooperative, alert and oriented, well developed, well nourished, in no acute distress        Skin:  warm and dry to the touch, no apparent skin lesions or masses noted          Head:  normocephalic, no masses or lesions        Eyes:           Lymph:      ENT:  no pallor or cyanosis        Neck:           Respiratory:  clear to auscultation         Cardiac: regular rhythm                pulses full and equal                                        GI:           Extremities and Muscular Skeletal:  no edema              Neurological:  no gross motor deficits        Psych:  Alert and Oriented x 3        CC  Radha Meier MD  [address]  [place]  [place]  [id number]              Thank you for allowing me to participate in the care of your patient.      Sincerely,     Radha Meier MD     [facility]

## 2023-03-07 ENCOUNTER — TELEPHONE (OUTPATIENT)
Dept: CARDIOLOGY | Facility: CLINIC | Age: 63
End: 2023-03-07
Payer: COMMERCIAL

## 2023-03-07 DIAGNOSIS — I25.10 CORONARY ARTERY DISEASE INVOLVING NATIVE CORONARY ARTERY OF NATIVE HEART WITHOUT ANGINA PECTORIS: Primary | ICD-10-CM

## 2023-03-07 DIAGNOSIS — E78.00 PURE HYPERCHOLESTEROLEMIA: ICD-10-CM

## 2023-03-07 NOTE — TELEPHONE ENCOUNTER
Lipids and Lp(a) drawn post-OV 3/6/2023 noted.     Per dictation 3/6/2023 from Dr. Meier:   We also discussed testing for risk enhancers such as lipoprotein (a).  I think it be reasonable to check for an elevated level since this may have implications for her family.    Component      Latest Ref Rng & Units 3/6/2023   Cholesterol      <200 mg/dL 153   Triglycerides      <150 mg/dL 42   HDL Cholesterol      >=50 mg/dL 81   LDL Cholesterol Calculated      <=100 mg/dL 64   Non HDL Cholesterol      <130 mg/dL 72   Lipoprotein (a)      <30 mg/dL 51 (H)     Will message Dr. Meier to review

## 2023-03-07 NOTE — TELEPHONE ENCOUNTER
Lipoprotein a is elevated. No changes to her regimen but her children should be screened. Thanks.

## 2023-03-07 NOTE — TELEPHONE ENCOUNTER
Left a message for patient to call back to review message from Dr Meier.       Addendum 1120: Spoke to patient, reviewed labs and message from Dr Meier. She verbalized understanding and has no questions at this time.

## 2023-04-16 ENCOUNTER — HEALTH MAINTENANCE LETTER (OUTPATIENT)
Age: 63
End: 2023-04-16

## 2023-09-07 DIAGNOSIS — I25.10 CORONARY ARTERY DISEASE INVOLVING NATIVE CORONARY ARTERY OF NATIVE HEART WITHOUT ANGINA PECTORIS: ICD-10-CM

## 2023-09-07 RX ORDER — LISINOPRIL 2.5 MG/1
2.5 TABLET ORAL DAILY
Qty: 90 TABLET | Refills: 1 | Status: SHIPPED | OUTPATIENT
Start: 2023-09-07 | End: 2024-04-17

## 2023-09-22 ENCOUNTER — TELEPHONE (OUTPATIENT)
Dept: CARDIOLOGY | Facility: CLINIC | Age: 63
End: 2023-09-22
Payer: MEDICARE

## 2023-09-22 NOTE — TELEPHONE ENCOUNTER
Spoke with pt and will check when April schedule is released next week and call pt back with dates.   Pt gave verbal understanding.

## 2023-09-22 NOTE — TELEPHONE ENCOUNTER
[facility] Call Center    Phone Message    May a detailed message be left on voicemail: yes     Reason for Call: Other: Patient requesting call back to discuss appointment needed for March and was told to reach out to Dr. Meier if she could not get a sooner appointment. Please call back to discuss scheduling.      Action Taken: Other: Cardiology    Travel Screening: Not Applicable

## 2023-09-29 ENCOUNTER — TELEPHONE (OUTPATIENT)
Dept: CARDIOLOGY | Facility: CLINIC | Age: 63
End: 2023-09-29
Payer: MEDICARE

## 2023-09-29 NOTE — TELEPHONE ENCOUNTER
Attempted to contact patient regarding her request for vit D labs. Left a message that labs needed for PCP management should be ordered through her PCP but we can try to combine labs in one visit to avoid multiple lab visits. Reviewed that Dr. Nobles can order labs with our visit or we can have ours drawn along with a PCP clinic visit.

## 2023-09-29 NOTE — TELEPHONE ENCOUNTER
[facility] Call Center    Phone Message    May a detailed message be left on voicemail: yes     Reason for Call: Other: Pt has labs in April and would like her Vitamin D lab ordered for that day as well     Action Taken: Other: Cardio    Travel Screening: Not Applicable

## 2023-10-23 ENCOUNTER — MYC MEDICAL ADVICE (OUTPATIENT)
Dept: CARDIOLOGY | Facility: CLINIC | Age: 63
End: 2023-10-23
Payer: MEDICARE

## 2023-10-23 DIAGNOSIS — I25.10 CORONARY ARTERY DISEASE INVOLVING NATIVE CORONARY ARTERY OF NATIVE HEART WITHOUT ANGINA PECTORIS: ICD-10-CM

## 2023-10-23 RX ORDER — METOPROLOL SUCCINATE 25 MG/1
12.5 TABLET, EXTENDED RELEASE ORAL DAILY
Qty: 45 TABLET | Refills: 2 | Status: SHIPPED | OUTPATIENT
Start: 2023-10-23 | End: 2024-04-17

## 2023-10-23 NOTE — TELEPHONE ENCOUNTER
My chart message from patient:  Glenny Butts [facility] Team 2  Phone Number: [phone]     Good morning,  I called [facility] yesterday to have my Metoprolol refilled and they said I needed to call the doctor's office.  I told them that they usually call and get the approval.  The pharmacist informed me that I had to contact the doctor because my refill was closed.    I think what happened is I usually see Dr. Meier in the fall and then he renews my medication.  When I saw him last fall (2022) he wanted to see me in 6 months to check to see how I was doing. When I saw him for the 6 month check up he said he would see me in a year.  I did not think at the time to get my medication filled since I would not be seeing him in the fall.  I do have my yearly appointment with him and an echocardiogram scheduled I  believe in May 2024.    Do I need to make an appointment to see him to get my medications filled or will he be willing to fill them up to the time that I will be seeing him next year? Then we can get back on the schedule for yearly exam.    Thank you for checking on this.  I have enough Metoprolol to get me through Wednesday this week.    Maribel      1023 reply to patient:    Nolberto, Ms. Beckie,    We have sent in your metoprolol refill. You should be good through your April visit.    Echo and labs on 4/11/2023  Office visit on 4/17/2023    The pharmacies have their own protocols for expiration, so we have to just send refills when they are requested. So sorry for the inconvenience.    Thank you  Team 2 R.N.s  [phone]

## 2023-11-26 ENCOUNTER — HEALTH MAINTENANCE LETTER (OUTPATIENT)
Age: 63
End: 2023-11-26

## 2024-02-07 DIAGNOSIS — I25.10 CORONARY ARTERY DISEASE INVOLVING NATIVE CORONARY ARTERY OF NATIVE HEART WITHOUT ANGINA PECTORIS: ICD-10-CM

## 2024-02-07 RX ORDER — ATORVASTATIN CALCIUM 40 MG/1
40 TABLET, FILM COATED ORAL DAILY
Qty: 90 TABLET | Refills: 0 | Status: SHIPPED | OUTPATIENT
Start: 2024-02-07 | End: 2024-04-17

## 2024-02-12 ENCOUNTER — TRANSFERRED RECORDS (OUTPATIENT)
Dept: FAMILY MEDICINE | Facility: CLINIC | Age: 64
End: 2024-02-12

## 2024-04-03 DIAGNOSIS — E78.00 PURE HYPERCHOLESTEROLEMIA: Primary | ICD-10-CM

## 2024-04-03 DIAGNOSIS — I25.10 CORONARY ARTERY DISEASE INVOLVING NATIVE CORONARY ARTERY OF NATIVE HEART WITHOUT ANGINA PECTORIS: ICD-10-CM

## 2024-04-08 ENCOUNTER — MYC MEDICAL ADVICE (OUTPATIENT)
Dept: CARDIOLOGY | Facility: CLINIC | Age: 64
End: 2024-04-08
Payer: MEDICARE

## 2024-04-08 NOTE — TELEPHONE ENCOUNTER
My chart message from patient:  Glenny Butts [facility] Team 2  Phone Number: [phone]     Hi Dr. Meier,  I have a lab appointment this Thursday 4/11/24 for my appointment with you on 4/17/24.  At one time you told me that I did not need to fast for my labs, so I am wondering if I should fast or not fast for this weeks labs.    Thank you,    Maribel  ========================    Patient is scheduled for bmp, lipids and echo on 4/11/2024.    Reply to patient:    Nolberto, Ms. Butts,    In most cases we do not need to have you fast for labs. However, this visit on 4/11/2024 we are checking your lipid panel. It would help to have a fasting lipid panel for this visit.    Thank you  Team 2 R.N.s   [phone]

## 2024-04-11 ENCOUNTER — HOSPITAL ENCOUNTER (OUTPATIENT)
Dept: CARDIOLOGY | Facility: CLINIC | Age: 64
Discharge: HOME | End: 2024-04-11
Attending: INTERNAL MEDICINE | Admitting: INTERNAL MEDICINE
Payer: MEDICARE

## 2024-04-11 ENCOUNTER — LAB (OUTPATIENT)
Dept: LAB | Facility: CLINIC | Age: 64
End: 2024-04-11
Payer: MEDICARE

## 2024-04-11 DIAGNOSIS — I25.10 CORONARY ARTERY DISEASE INVOLVING NATIVE CORONARY ARTERY OF NATIVE HEART WITHOUT ANGINA PECTORIS: ICD-10-CM

## 2024-04-11 DIAGNOSIS — E78.00 PURE HYPERCHOLESTEROLEMIA: ICD-10-CM

## 2024-04-11 LAB
ANION GAP SERPL CALCULATED.3IONS-SCNC: 7 MMOL/L (ref 7–15)
BUN SERPL-MCNC: 21.7 MG/DL (ref 8–23)
CALCIUM SERPL-MCNC: 9.4 MG/DL (ref 8.8–10.2)
CHLORIDE SERPL-SCNC: 101 MMOL/L (ref 98–107)
CHOLEST SERPL-MCNC: 146 MG/DL
CREAT SERPL-MCNC: 0.73 MG/DL (ref 0.51–0.95)
DEPRECATED HCO3 PLAS-SCNC: 31 MMOL/L (ref 22–29)
EGFRCR SERPLBLD CKD-EPI 2021: >90 ML/MIN/1.73M2
FASTING STATUS PATIENT QL REPORTED: YES
GLUCOSE SERPL-MCNC: 85 MG/DL (ref 70–99)
HDLC SERPL-MCNC: 81 MG/DL
LDLC SERPL CALC-MCNC: 56 MG/DL
LVEF ECHO: NORMAL
NONHDLC SERPL-MCNC: 65 MG/DL
POTASSIUM SERPL-SCNC: 4.2 MMOL/L (ref 3.4–5.3)
SODIUM SERPL-SCNC: 139 MMOL/L (ref 135–145)
TRIGL SERPL-MCNC: 45 MG/DL

## 2024-04-11 PROCEDURE — 80048 BASIC METABOLIC PNL TOTAL CA: CPT | Performed by: INTERNAL MEDICINE

## 2024-04-11 PROCEDURE — 93306 TTE W/DOPPLER COMPLETE: CPT | Mod: 26 | Performed by: INTERNAL MEDICINE

## 2024-04-11 PROCEDURE — 36415 COLL VENOUS BLD VENIPUNCTURE: CPT | Performed by: INTERNAL MEDICINE

## 2024-04-11 PROCEDURE — 93306 TTE W/DOPPLER COMPLETE: CPT

## 2024-04-11 PROCEDURE — 80061 LIPID PANEL: CPT | Performed by: INTERNAL MEDICINE

## 2024-04-17 ENCOUNTER — OFFICE VISIT (OUTPATIENT)
Dept: CARDIOLOGY | Facility: CLINIC | Age: 64
End: 2024-04-17
Payer: MEDICARE

## 2024-04-17 VITALS
HEART RATE: 55 BPM | WEIGHT: 124.7 LBS | SYSTOLIC BLOOD PRESSURE: 111 MMHG | DIASTOLIC BLOOD PRESSURE: 67 MMHG | BODY MASS INDEX: 20.04 KG/M2 | HEIGHT: 66 IN | OXYGEN SATURATION: 99 %

## 2024-04-17 DIAGNOSIS — I25.10 CORONARY ARTERY DISEASE INVOLVING NATIVE CORONARY ARTERY OF NATIVE HEART WITHOUT ANGINA PECTORIS: ICD-10-CM

## 2024-04-17 PROCEDURE — 99214 OFFICE O/P EST MOD 30 MIN: CPT | Performed by: INTERNAL MEDICINE

## 2024-04-17 RX ORDER — METOPROLOL SUCCINATE 25 MG/1
12.5 TABLET, EXTENDED RELEASE ORAL DAILY
Qty: 45 TABLET | Refills: 3 | Status: SHIPPED | OUTPATIENT
Start: 2024-04-17

## 2024-04-17 RX ORDER — NITROGLYCERIN 0.4 MG/1
0.4 TABLET SUBLINGUAL EVERY 5 MIN PRN
Qty: 25 TABLET | Refills: 3 | Status: SHIPPED | OUTPATIENT
Start: 2024-04-17

## 2024-04-17 RX ORDER — LISINOPRIL 2.5 MG/1
2.5 TABLET ORAL DAILY
Qty: 90 TABLET | Refills: 3 | Status: SHIPPED | OUTPATIENT
Start: 2024-04-17 | End: 2024-06-24

## 2024-04-17 RX ORDER — ATORVASTATIN CALCIUM 40 MG/1
40 TABLET, FILM COATED ORAL DAILY
Qty: 90 TABLET | Refills: 3 | Status: SHIPPED | OUTPATIENT
Start: 2024-04-17

## 2024-04-17 NOTE — PROGRESS NOTES
HPI and Plan:       I had the pleasure of seeing Ms. Butts in followup at [facility] today.  She is a very pleasant 63-year-old female who I last saw in 3/2023.    She has a history of coronary artery disease and is status post non-ST elevation myocardial infarction for which she underwent PCI to the LAD in 2015.  She underwent repeat coronary angiography in 2019 for chest discomfort and was found to have a widely patent stent in the LAD without evidence of restenosis.  Ostial narrowing of the first diagonal branch in the 80%-90% range was noted but unchanged compared to her prior angiogram    Most recently she underwent coronary angiography on 5/31/2022 after a stress echocardiogram performed for arm discomfort demonstrated severe hypokinesis of the mid to distal anterolateral wall and apex of the left ventricle, although she was able to exercise 11 minutes without any symptoms.    She was found to have essentially stable findings compared to her prior angiogram with a patent LAD stent and significant disease involving the diagonal as previously described.  Medical therapy was recommended, and a subsequent Zio patch monitor demonstrated short runs of SVT/nonsustained ventricular tachycardia.      Today she feels well overall from a cardiovascular standpoint.  She continues to walk 5 days a week, and also works full-time.  She denies any exertional chest discomfort, palpitations, syncope or presyncope.      She has been trying to gain some weight given the presence of osteopenia and is primarily trying to achieve this by supplementing her protein intake.    She had some questions regarding her echocardiographic report, specifically the low normal left ventricular systolic function and trace to mild mitral and aortic regurgitation that were reported.     PHYSICAL EXAMINATION:  Dictated below.      LABORATORY STUDIES: Lipids on 4/11/2024 demonstrated total cholesterol 146, HDL of 81,  LDL of 56 and triglycerides of 45.    LP(a) was checked on 3/6/2023 and noted to be elevated at 51 mg/dL.    I independently reviewed and interpreted her echocardiogram with her.  To my eye left ventricular systolic function appears to be near normal.  We also went over the trace to mild mitral and aortic regurgitation reported, which in the context of testing normal biventricular systolic function and her excellent functional status is clinically insignificant.     IMPRESSION:      1. Coronary artery disease status post PCI to the LAD in 2015 as described above.  She is known to have significant disease involving the first diagonal which was thought to be appropriate for medical management.  2.  Repeat coronary angiography performed for an abnormal stress echocardiogram in May 2022 which demonstrated unchanged anatomy.  3.  Dyslipidemia with an elevated LP(a).  Under excellent control on atorvastatin 40 mg daily.    PLAN    Ms. Garay is doing well overall from a cardiovascular standpoint.  There is no evidence of angina or congestive heart failure.  Her functional status remains preserved.    As stated above, we went over her echocardiographic results in detail and her left ventricular systolic function appears near normal.  Her medications are appropriate and I will make no changes today.    It was a pleasure seeing her in follow-up today.  Assuming her clinical status remains stable, I will plan on follow-up in approximately 1 year.    CURRENT MEDICATIONS:  Current Outpatient Medications   Medication Sig Dispense Refill    acetaminophen (TYLENOL) 325 MG tablet Take 325-650 mg by mouth every 6 hours as needed for mild pain      ASPIRIN PO Take 81 mg by mouth daily      atorvastatin (LIPITOR) 40 MG tablet Take 1 tablet (40 mg) by mouth daily 90 tablet 0    Calcium-Magnesium-Vitamin D (CALCIUM 1200+D3 PO) Take 1 tablet by mouth daily 1,000 units D3      doxylamine (UNISOM) 25 MG TABS Take 12.5 mg by mouth nightly  as needed       lisinopril (ZESTRIL) 2.5 MG tablet Take 1 tablet (2.5 mg) by mouth daily 90 tablet 1    magnesium oxide 400 MG CAPS       metoprolol succinate ER (TOPROL XL) 25 MG 24 hr tablet Take 0.5 tablets (12.5 mg) by mouth daily 45 tablet 2    nitroGLYcerin (NITROSTAT) 0.4 MG sublingual tablet Place 1 tablet (0.4 mg) under the tongue every 5 minutes as needed for chest pain 25 tablet 1    pantoprazole (PROTONIX) 40 MG EC tablet Take by mouth every 24 hours      vitamin C (ASCORBIC ACID) 1000 MG TABS Take 1,000 mg by mouth daily      Vitamin D3 (CHOLECALCIFEROL) 125 MCG (5000 UT) tablet Take 1 tablet by mouth daily      zinc gluconate 30 MG TABS Take 50 mg by mouth daily         ALLERGIES     Allergies   Allergen Reactions    Dust Mites     Grass     Nickel     Trees        PAST MEDICAL HISTORY:  Past Medical History:   Diagnosis Date    CAD (coronary artery disease) 2/15    Stent to LAD 01/2015    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     Hyperlipidemia     Ischemic cardiomyopathy     NSTEMI (non-ST elevated myocardial infarction) (H) 2/15    S/P coronary angiogram 10-5-2015    patent prev placed stent-aggressive medical management    Stress incontinence     Syncope        PAST SURGICAL HISTORY:  Past Surgical History:   Procedure Laterality Date    ANGIOGRAM      ANGIOGRAM  2019    C DEXA, BONE DENSITY, AXIAL SKEL      CHOLECYSTECTOMY      COLONOSCOPY      CV CORONARY ANGIOGRAM N/A 5/31/2022    Procedure: Coronary Angiogram;  Surgeon: Roderick Francisco MD;  Location:  HEART CARDIAC CATH LAB    CV LEFT HEART CATH N/A 5/31/2022    Procedure: Left Heart Catheterization;  Surgeon: Roderick Francisco MD;  Location:  HEART CARDIAC CATH LAB    CV PCI N/A 5/31/2022    Procedure: Percutaneous Coronary Intervention;  Surgeon: Roderick Francisco MD;  Location:  HEART CARDIAC CATH LAB    HEART CATH, ANGIOPLASTY  01/27/2015     mid LAD-2.75 X 28 mm Promus premier FAM     HYSTERECTOMY VAGINAL N/A  12/19/2014    Procedure: HYSTERECTOMY VAGINAL;  Surgeon: Aarti Ramirez MD;  Location: [facility] OR    wisdom teeth         FAMILY HISTORY:  Family History   Problem Relation Age of Onset    Dementia Mother     Myocardial Infarction Father 34        MI    Hypertension Brother     Hypertension Brother        SOCIAL HISTORY:  Social History     Socioeconomic History    Marital status:    Tobacco Use    Smoking status: Never    Smokeless tobacco: Never   Substance and Sexual Activity    Alcohol use: Yes     Comment: 4 drinks per month    Drug use: No   Other Topics Concern    Caffeine Concern No     Comment: cup of tea, no coffee    Sleep Concern Yes     Comment: takes OTC med    Stress Concern No    Weight Concern No    Special Diet Yes     Comment: LOW FAT, low sugar    Exercise Yes     Comment:  4 times week, brisk walking treadmill, elyptical    Seat Belt Yes    Parent/sibling w/ CABG, MI or angioplasty before 65F 55M? Yes       Review of Systems:  Skin:          Eyes:         ENT:         Respiratory:          Cardiovascular:         Gastroenterology:        Genitourinary:         Musculoskeletal:         Neurologic:         Psychiatric:         Heme/Lymph/Imm:         Endocrine:           Physical Exam:  Vitals: LMP 08/20/2014 (Exact Date)     Constitutional:           Skin:  warm and dry to the touch          Head:           Eyes:  pupils equal and round        Lymph:      ENT:           Neck:  JVP normal        Respiratory:  clear to auscultation         Cardiac: regular rhythm                                                         GI:  abdomen soft        Extremities and Muscular Skeletal:                 Neurological:           Psych:           CC  No referring provider defined for this encounter.

## 2024-04-17 NOTE — LETTER
4/17/2024    [facility]  [address]  [facility] [address]    RE: Glenny Wadsworthersen       Dear Colleague,     I had the pleasure of seeing Glenny Butts in the [facility].  HPI and Plan:       I had the pleasure of seeing Ms. Butts in followup at [facility] today.  She is a very pleasant 63-year-old female who I last saw in 3/2023.    She has a history of coronary artery disease and is status post non-ST elevation myocardial infarction for which she underwent PCI to the LAD in 2015.  She underwent repeat coronary angiography in 2019 for chest discomfort and was found to have a widely patent stent in the LAD without evidence of restenosis.  Ostial narrowing of the first diagonal branch in the 80%-90% range was noted but unchanged compared to her prior angiogram    Most recently she underwent coronary angiography on 5/31/2022 after a stress echocardiogram performed for arm discomfort demonstrated severe hypokinesis of the mid to distal anterolateral wall and apex of the left ventricle, although she was able to exercise 11 minutes without any symptoms.    She was found to have essentially stable findings compared to her prior angiogram with a patent LAD stent and significant disease involving the diagonal as previously described.  Medical therapy was recommended, and a subsequent Zio patch monitor demonstrated short runs of SVT/nonsustained ventricular tachycardia.      Today she feels well overall from a cardiovascular standpoint.  She continues to walk 5 days a week, and also works full-time.  She denies any exertional chest discomfort, palpitations, syncope or presyncope.      She has been trying to gain some weight given the presence of osteopenia and is primarily trying to achieve this by supplementing her protein intake.    She had some questions regarding her echocardiographic report, specifically the low normal left ventricular systolic  function and trace to mild mitral and aortic regurgitation that were reported.     PHYSICAL EXAMINATION:  Dictated below.      LABORATORY STUDIES: Lipids on 4/11/2024 demonstrated total cholesterol 146, HDL of 81, LDL of 56 and triglycerides of 45.    LP(a) was checked on 3/6/2023 and noted to be elevated at 51 mg/dL.    I independently reviewed and interpreted her echocardiogram with her.  To my eye left ventricular systolic function appears to be near normal.  We also went over the trace to mild mitral and aortic regurgitation reported, which in the context of testing normal biventricular systolic function and her excellent functional status is clinically insignificant.     IMPRESSION:      1. Coronary artery disease status post PCI to the LAD in 2015 as described above.  She is known to have significant disease involving the first diagonal which was thought to be appropriate for medical management.  2.  Repeat coronary angiography performed for an abnormal stress echocardiogram in May 2022 which demonstrated unchanged anatomy.  3.  Dyslipidemia with an elevated LP(a).  Under excellent control on atorvastatin 40 mg daily.    PLAN    Ms. Garay is doing well overall from a cardiovascular standpoint.  There is no evidence of angina or congestive heart failure.  Her functional status remains preserved.    As stated above, we went over her echocardiographic results in detail and her left ventricular systolic function appears near normal.  Her medications are appropriate and I will make no changes today.    It was a pleasure seeing her in follow-up today.  Assuming her clinical status remains stable, I will plan on follow-up in approximately 1 year.    CURRENT MEDICATIONS:  Current Outpatient Medications   Medication Sig Dispense Refill    acetaminophen (TYLENOL) 325 MG tablet Take 325-650 mg by mouth every 6 hours as needed for mild pain      ASPIRIN PO Take 81 mg by mouth daily      atorvastatin (LIPITOR) 40 MG  tablet Take 1 tablet (40 mg) by mouth daily 90 tablet 0    Calcium-Magnesium-Vitamin D (CALCIUM 1200+D3 PO) Take 1 tablet by mouth daily 1,000 units D3      doxylamine (UNISOM) 25 MG TABS Take 12.5 mg by mouth nightly as needed       lisinopril (ZESTRIL) 2.5 MG tablet Take 1 tablet (2.5 mg) by mouth daily 90 tablet 1    magnesium oxide 400 MG CAPS       metoprolol succinate ER (TOPROL XL) 25 MG 24 hr tablet Take 0.5 tablets (12.5 mg) by mouth daily 45 tablet 2    nitroGLYcerin (NITROSTAT) 0.4 MG sublingual tablet Place 1 tablet (0.4 mg) under the tongue every 5 minutes as needed for chest pain 25 tablet 1    pantoprazole (PROTONIX) 40 MG EC tablet Take by mouth every 24 hours      vitamin C (ASCORBIC ACID) 1000 MG TABS Take 1,000 mg by mouth daily      Vitamin D3 (CHOLECALCIFEROL) 125 MCG (5000 UT) tablet Take 1 tablet by mouth daily      zinc gluconate 30 MG TABS Take 50 mg by mouth daily         ALLERGIES     Allergies   Allergen Reactions    Dust Mites     Grass     Nickel     Trees        PAST MEDICAL HISTORY:  Past Medical History:   Diagnosis Date    CAD (coronary artery disease) 2/15    Stent to LAD 01/2015    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     Hyperlipidemia     Ischemic cardiomyopathy     NSTEMI (non-ST elevated myocardial infarction) (H) 2/15    S/P coronary angiogram 10-5-2015    patent prev placed stent-aggressive medical management    Stress incontinence     Syncope        PAST SURGICAL HISTORY:  Past Surgical History:   Procedure Laterality Date    ANGIOGRAM      ANGIOGRAM  2019    C DEXA, BONE DENSITY, AXIAL SKEL      CHOLECYSTECTOMY      COLONOSCOPY      CV CORONARY ANGIOGRAM N/A 5/31/2022    Procedure: Coronary Angiogram;  Surgeon: Roderick Francisco MD;  Location:  HEART CARDIAC CATH LAB    CV LEFT HEART CATH N/A 5/31/2022    Procedure: Left Heart Catheterization;  Surgeon: Roderick Francisco MD;  Location: [facility] CARDIAC C[facility] LAB    CV PCI N/A 5/31/2022    Procedure:  Percutaneous Coronary Intervention;  Surgeon: Roderick Francisco MD;  Location:  HEART CARDIAC CATH LAB    HEART CATH, ANGIOPLASTY  01/27/2015     mid LAD-2.75 X 28 mm Promus premier FAM     HYSTERECTOMY VAGINAL N/A 12/19/2014    Procedure: HYSTERECTOMY VAGINAL;  Surgeon: Aarti Ramirez MD;  Location:  OR    wisdom teeth         FAMILY HISTORY:  Family History   Problem Relation Age of Onset    Dementia Mother     Myocardial Infarction Father 34        MI    Hypertension Brother     Hypertension Brother        SOCIAL HISTORY:  Social History     Socioeconomic History    Marital status:    Tobacco Use    Smoking status: Never    Smokeless tobacco: Never   Substance and Sexual Activity    Alcohol use: Yes     Comment: 4 drinks per month    Drug use: No   Other Topics Concern    Caffeine Concern No     Comment: cup of tea, no coffee    Sleep Concern Yes     Comment: takes OTC med    Stress Concern No    Weight Concern No    Special Diet Yes     Comment: LOW FAT, low sugar    Exercise Yes     Comment:  4 times week, brisk walking treadmill, elyptical    Seat Belt Yes    Parent/sibling w/ CABG, MI or angioplasty before 65F 55M? Yes       Review of Systems:  Skin:          Eyes:         ENT:         Respiratory:          Cardiovascular:         Gastroenterology:        Genitourinary:         Musculoskeletal:         Neurologic:         Psychiatric:         Heme/Lymph/Imm:         Endocrine:           Physical Exam:  Vitals: LMP 08/20/2014 (Exact Date)     Constitutional:           Skin:  warm and dry to the touch          Head:           Eyes:  pupils equal and round        Lymph:      ENT:           Neck:  JVP normal        Respiratory:  clear to auscultation         Cardiac: regular rhythm                                                         GI:  abdomen soft        Extremities and Muscular Skeletal:                 Neurological:           Psych:           CC  No referring provider defined for this  encounter.                  Thank you for allowing me to participate in the care of your patient.      Sincerely,     Radha Meier MD     [facility]

## 2024-06-24 ENCOUNTER — OFFICE VISIT (OUTPATIENT)
Dept: FAMILY MEDICINE | Facility: CLINIC | Age: 64
End: 2024-06-24

## 2024-06-24 VITALS
OXYGEN SATURATION: 100 % | BODY MASS INDEX: 19.85 KG/M2 | DIASTOLIC BLOOD PRESSURE: 67 MMHG | SYSTOLIC BLOOD PRESSURE: 126 MMHG | WEIGHT: 123 LBS | HEART RATE: 50 BPM

## 2024-06-24 DIAGNOSIS — I25.2 H/O NON-ST ELEVATION MYOCARDIAL INFARCTION (NSTEMI): ICD-10-CM

## 2024-06-24 DIAGNOSIS — I25.2 H/O NON-ST ELEVATION MYOCARDIAL INFARCTION (NSTEMI): Primary | ICD-10-CM

## 2024-06-24 DIAGNOSIS — I25.10 CORONARY ARTERY DISEASE INVOLVING NATIVE CORONARY ARTERY OF NATIVE HEART WITHOUT ANGINA PECTORIS: ICD-10-CM

## 2024-06-24 DIAGNOSIS — J30.2 SEASONAL ALLERGIC RHINITIS, UNSPECIFIED TRIGGER: ICD-10-CM

## 2024-06-24 PROCEDURE — 99214 OFFICE O/P EST MOD 30 MIN: CPT

## 2024-06-24 PROCEDURE — G2211 COMPLEX E/M VISIT ADD ON: HCPCS

## 2024-06-24 RX ORDER — FLUTICASONE PROPIONATE 50 MCG
2 SPRAY, SUSPENSION (ML) NASAL DAILY
Qty: 16 G | Refills: 1 | Status: SHIPPED | OUTPATIENT
Start: 2024-06-24 | End: 2024-06-24

## 2024-06-24 RX ORDER — FLUTICASONE PROPIONATE 50 MCG
2 SPRAY, SUSPENSION (ML) NASAL DAILY
Qty: 48 G | Refills: 0 | Status: SHIPPED | OUTPATIENT
Start: 2024-06-24 | End: 2024-08-23

## 2024-06-24 RX ORDER — OLMESARTAN MEDOXOMIL 5 MG/1
5 TABLET ORAL DAILY
Qty: 90 TABLET | Refills: 0 | Status: SHIPPED | OUTPATIENT
Start: 2024-06-24 | End: 2024-07-22

## 2024-06-24 RX ORDER — OLMESARTAN MEDOXOMIL 5 MG/1
5 TABLET ORAL DAILY
Qty: 30 TABLET | Refills: 0 | Status: SHIPPED | OUTPATIENT
Start: 2024-06-24 | End: 2024-06-24

## 2024-06-24 RX ORDER — CALCIUM CARBONATE 750 MG/1
750 TABLET, CHEWABLE ORAL DAILY
COMMUNITY

## 2024-06-24 NOTE — TELEPHONE ENCOUNTER
Med: Olmesartan   flonase  90 DAY PLEASE     LOV (related): 6/24/24    Last Lab: 4/11/24      Due for F/U around: as needed    Next Appt: No current future appointments scheduled         BP Readings from Last 3 Encounters:   06/24/24 126/67   04/17/24 111/67   03/06/23 120/74       Last Comprehensive Metabolic Panel:  Lab Results   Component Value Date     04/11/2024    POTASSIUM 4.2 04/11/2024    CHLORIDE 101 04/11/2024    CO2 31 (H) 04/11/2024    ANIONGAP 7 04/11/2024    GLC 85 04/11/2024    BUN 21.7 04/11/2024    CR 0.73 04/11/2024    GFRESTIMATED >90 04/11/2024    PARKER 9.4 04/11/2024

## 2024-06-24 NOTE — PATIENT INSTRUCTIONS
Olmesartan 5mg daily     Lisinopril 2.5mg can cause cough as a side effect, lets see how you would do off of this for 1-2 weeks.

## 2024-06-24 NOTE — PROGRESS NOTES
Assessment & Plan     H/O non-ST elevation myocardial infarction (NSTEMI)  - curious if it has been lisinopril causing cough for all these years, will trial going off of lisinopril and switch to olmesartan and can follow up with cardiology on this plan  - if not improving her cough in the next 1-2 weeks then consider imaging   - olmesartan (BENICAR) 5 MG tablet  Dispense: 30 tablet; Refill: 0    Coronary artery disease involving native coronary artery of native heart without angina pectoris  - see plan above   - olmesartan (BENICAR) 5 MG tablet  Dispense: 30 tablet; Refill: 0    Seasonal allergic rhinitis, unspecified trigger  - trial flonase to see if helps with post nasal drip and congestion, no signs of infection today but if having overall malaise and persistent congestion not improving over the next week, can consider antibiotic treatment  - Education about adverse effects of prescription medication discussed  -Patient verbalized understanding and is agreeable to the discussed plan of care.    - fluticasone (FLONASE) 50 MCG/ACT nasal spray  Dispense: 16 g; Refill: 1      See Patient Instructions    No follow-ups on file.    Xavi Murrieta is a 63 year old, presenting for the following health issues:  Allergic Rhinitis (Throat tightness, productive cough, yellow phlegm, malaise. Has been needing to take tylenol daily for 3 weeks/Denies fever/chills, body aches, rashes/Also has had chronic cough for years,)    History of Present Illness       Reason for visit:  Symptoms are coughing and not feeling wellShe consumes 0 sweetened beverage(s) daily.She exercises with enough effort to increase her heart rate 30 to 60 minutes per day.  She exercises with enough effort to increase her heart rate 5 days per week.   She is taking medications regularly.    Years of cough - prior to COVID - has not mentioned to any provider prior to today's visit - unsure on when this cough started but is dry     Unable to take  allergy medication as she has an issue with dry mouth - tongue hurts after taking for 1 week.     2 weeks feels like her throat is tight and more tired - taking tylenol and feels better on this          Review of Systems  Constitutional, HEENT, cardiovascular, pulmonary, gi and gu systems are negative, except as otherwise noted.      Objective    /67   Pulse 50   Wt 55.8 kg (123 lb)   LMP 08/20/2014 (Exact Date)   SpO2 100%   BMI 19.85 kg/m    Body mass index is 19.85 kg/m .  Physical Exam   GENERAL: alert and no distress  EYES: Eyes grossly normal to inspection, PERRL and conjunctivae and sclerae normal  HENT: normal cephalic/atraumatic, both ears: clear effusion and erythematous, nose and mouth without ulcers or lesions, nasal mucosa edematous , rhinorrhea clear and white, oropharynx clear, oral mucous membranes moist, and mild cobblestoning to posterior pharynx   NECK: no adenopathy, no asymmetry, masses, or scars  RESP: lungs clear to auscultation - no rales, rhonchi or wheezes  CV: regular rate and rhythm, normal S1 S2, no S3 or S4, no murmur, click or rub, no peripheral edema  MS: no gross musculoskeletal defects noted, no edema  SKIN: no suspicious lesions or rashes  PSYCH: mentation appears normal, affect normal/bright    No results found for this or any previous visit (from the past 24 hour(s)).        Signed Electronically by: COLE Partida CNP

## 2024-06-26 ENCOUNTER — MYC MEDICAL ADVICE (OUTPATIENT)
Dept: CARDIOLOGY | Facility: CLINIC | Age: 64
End: 2024-06-26
Payer: MEDICARE

## 2024-06-26 NOTE — TELEPHONE ENCOUNTER
"Update sent to patient:    Ms. Beckie Arvizu,    Dr. Meier reviewed your update and said, \"Sure, it is a good idea. I agree with the change. \"     It is fine to have Dr. Nobles update the medication.    Thank you  Team 2 R.N.s  [phone]    "

## 2024-06-26 NOTE — TELEPHONE ENCOUNTER
My chart message from patient:  Glenny Butts [facility] Team 2  Phone Number: [phone]     Good morning,    I went in to see Shagufta Rico,  [facility], on Monday for my allergy symptoms.  During our visit I mentioned to her that I have had a chronic cough for quite sometime.  I don't remember exactly when it started but I have it for awhile. Shagufta told me that the blood pressure medication Lisinopril that  I was taking has a side effect of a cough.  I did not realize that this was a side effect and never thought to mention coughing to Dr. Meier.    I do not want to change any of my medications for my heart until I get the approval from Dr. Meier.  The medication that she recommended is Olmesartan instead of the Lisinopril. She sent it over to my pharmacy but I told her I wasn't going to pick it up until I talk to Dr. Meier.    Would you please run this by Dr. Meier to see if he thinks that this would be a good change.  Everything has been going so well with my heart that I don't want to change anything that might upset something.  Also, would it be better for me to continue with Dr. Meier filling  all of my medications.    Sorry for such a long email.  Please let me know what Dr. Meier would recommend.    Thank you!!      Will message Dr. Meier to review

## 2024-07-22 DIAGNOSIS — I25.2 H/O NON-ST ELEVATION MYOCARDIAL INFARCTION (NSTEMI): ICD-10-CM

## 2024-07-22 DIAGNOSIS — I25.10 CORONARY ARTERY DISEASE INVOLVING NATIVE CORONARY ARTERY OF NATIVE HEART WITHOUT ANGINA PECTORIS: ICD-10-CM

## 2024-07-22 RX ORDER — OLMESARTAN MEDOXOMIL 5 MG/1
5 TABLET ORAL DAILY
Qty: 90 TABLET | Refills: 0 | Status: SHIPPED | OUTPATIENT
Start: 2024-07-22

## 2024-07-22 NOTE — TELEPHONE ENCOUNTER
Med: Olmesartan       LOV (related): 6/24/24    Last Lab: 4/11/24      Due for F/U around: one yr for BP check   Also due for cpx last cpx was 9/17/2018    Next Appt: No current future appointments scheduled         BP Readings from Last 3 Encounters:   06/24/24 126/67   04/17/24 111/67   03/06/23 120/74       Last Comprehensive Metabolic Panel:  Lab Results   Component Value Date     04/11/2024    POTASSIUM 4.2 04/11/2024    CHLORIDE 101 04/11/2024    CO2 31 (H) 04/11/2024    ANIONGAP 7 04/11/2024    GLC 85 04/11/2024    BUN 21.7 04/11/2024    CR 0.73 04/11/2024    GFRESTIMATED >90 04/11/2024    PARKER 9.4 04/11/2024

## 2024-08-23 DIAGNOSIS — J30.2 SEASONAL ALLERGIC RHINITIS, UNSPECIFIED TRIGGER: ICD-10-CM

## 2024-08-23 RX ORDER — FLUTICASONE PROPIONATE 50 MCG
2 SPRAY, SUSPENSION (ML) NASAL DAILY
Qty: 16 G | Refills: 0 | Status: SHIPPED | OUTPATIENT
Start: 2024-08-23

## 2024-08-23 NOTE — TELEPHONE ENCOUNTER
8/23/24 spoke with patient.  She chooses not to do a physical at this time.  Does those with GYN office.  She will ask them to send office notes to us.  Sees cardiology and they draw her Lipid (just done 4/2024). She just had office visit in June and is still seeing how the new medication on nose stuff works.  Will call us back if needs to be seen for this.  Other wise I told her she is probably not due to come back till next June.

## 2024-08-23 NOTE — CONFIDENTIAL NOTE
Med: FLUTICASONE    LOV (related): 6/24/24      Due for F/U around: OVERDUE FOR CPX    Next Appt: NONE

## 2024-11-22 DIAGNOSIS — J30.2 SEASONAL ALLERGIC RHINITIS, UNSPECIFIED TRIGGER: ICD-10-CM

## 2024-11-22 NOTE — TELEPHONE ENCOUNTER
Med: Flonase    LOV (related): 6/24/24      Due for F/U around: Not specified    Next Appt: Not Scheduled

## 2024-11-24 RX ORDER — FLUTICASONE PROPIONATE 50 MCG
2 SPRAY, SUSPENSION (ML) NASAL DAILY
Qty: 16 G | Refills: 0 | Status: SHIPPED | OUTPATIENT
Start: 2024-11-24

## 2025-01-04 ENCOUNTER — HEALTH MAINTENANCE LETTER (OUTPATIENT)
Age: 65
End: 2025-01-04

## 2025-01-14 DIAGNOSIS — I25.2 H/O NON-ST ELEVATION MYOCARDIAL INFARCTION (NSTEMI): ICD-10-CM

## 2025-01-14 DIAGNOSIS — I25.10 CORONARY ARTERY DISEASE INVOLVING NATIVE CORONARY ARTERY OF NATIVE HEART WITHOUT ANGINA PECTORIS: ICD-10-CM

## 2025-01-15 NOTE — TELEPHONE ENCOUNTER
Received fax from [facility] requesting refill on olmesartan 5mg si po QD.     Last related visit: 24 saw Shagufta Rico NP for allergic rhinitis and chronic cough for years. Patient curious if lisinopril had been cause of cough. Patient's CAD/HTN has been managed by cardiology for years.   Plan from visit note:   H/O non-ST elevation myocardial infarction (NSTEMI)  - curious if it has been lisinopril causing cough for all these years, will trial going off of lisinopril and switch to olmesartan and can follow up with cardiology on this plan  - if not improving her cough in the next 1-2 weeks then consider imaging   - olmesartan (BENICAR) 5 MG tablet  Dispense: 30 tablet; Refill: 0     Patient did send Fablistict message to cardiology 2 days later (24)  informing of this med change and asking if Dr. Meier agrees with plan. Dr. Meier agreed.     Per chart review:   Prior to above visit with Shagufta Rico NP, patient had not been seen in this PCP office since 10/21/22 for cataract preop and 2018 for physical.   Overdue for AWV/preventative visit. Last mammo in [facility] was ; last colonoscopy in [facility] was  with rec to repeat in 5 years.     25 6:50 PM called patient now to discuss above and offer appt.   Patient \[facility] [place] [facility]\"" is open to visit with Dr. Nobles for BP check but plans to see cardiology for annual visit in May and will ask him to take over the olmesartan rx. Reports BP's at home running 110-120/70.   Sees gyne for preventative care. Gets mammograms at [facility], last was 2024.   Colonoscopy - \[facility] [place] [facility]\"" gets these done as recommended with [facility] and is up to date on these.   [facility] has always considered PCP as provider of acute care when gets ill and specialists for management of chronic medical conditions. Has been busy past several months helping  manage new dx and treatment for lymphoma.     25 reviewed above with Dr. Nobles. Per patient request, okay to send refill of olmesartan to  pharmacy to hold her until May cardiology appt. Rx sent and patient informed.  Erna Smith RN

## 2025-01-16 RX ORDER — OLMESARTAN MEDOXOMIL 5 MG/1
5 TABLET ORAL DAILY
Qty: 90 TABLET | Refills: 0 | Status: SHIPPED | OUTPATIENT
Start: 2025-01-16 | End: 2025-01-16

## 2025-01-16 RX ORDER — OLMESARTAN MEDOXOMIL 5 MG/1
5 TABLET ORAL DAILY
Qty: 120 TABLET | Refills: 0 | Status: SHIPPED | OUTPATIENT
Start: 2025-01-16

## 2025-05-10 ENCOUNTER — HEALTH MAINTENANCE LETTER (OUTPATIENT)
Age: 65
End: 2025-05-10

## 2025-05-15 ENCOUNTER — MYC MEDICAL ADVICE (OUTPATIENT)
Dept: CARDIOLOGY | Facility: CLINIC | Age: 65
End: 2025-05-15
Payer: MEDICARE

## 2025-05-15 DIAGNOSIS — I25.10 CORONARY ARTERY DISEASE INVOLVING NATIVE CORONARY ARTERY OF NATIVE HEART WITHOUT ANGINA PECTORIS: Primary | ICD-10-CM

## 2025-05-15 DIAGNOSIS — E78.00 PURE HYPERCHOLESTEROLEMIA: ICD-10-CM

## 2025-05-15 NOTE — TELEPHONE ENCOUNTER
My chart message from patient:  Would it be possible to have my blood work done when I come in for my visit with Dr. Radha Meier on Tuesday 5/20/25?     Thank you!  ============  Patient is taking a statin and ARB, flp and bmp are due  Orders placed    Reply to patient  [place], Ms. Butts,    We cannot schedule labs from the nursing department. You need to call scheduling at [phone] and see what openings they have. You may also schedule at any [facility] if there is one closer to your home/work.    We have update the orders for a fasting lipid panel and bmp.    Thank you for checking  Team 2 R.N.s  [phone]

## 2025-05-19 ENCOUNTER — LAB (OUTPATIENT)
Dept: LAB | Facility: CLINIC | Age: 65
End: 2025-05-19
Payer: MEDICARE

## 2025-05-19 ENCOUNTER — RESULTS FOLLOW-UP (OUTPATIENT)
Dept: CARDIOLOGY | Facility: CLINIC | Age: 65
End: 2025-05-19

## 2025-05-19 DIAGNOSIS — I25.10 CORONARY ARTERY DISEASE INVOLVING NATIVE CORONARY ARTERY OF NATIVE HEART WITHOUT ANGINA PECTORIS: ICD-10-CM

## 2025-05-19 DIAGNOSIS — E78.00 PURE HYPERCHOLESTEROLEMIA: ICD-10-CM

## 2025-05-19 LAB
ANION GAP SERPL CALCULATED.3IONS-SCNC: 11 MMOL/L (ref 7–15)
BUN SERPL-MCNC: 17.4 MG/DL (ref 8–23)
CALCIUM SERPL-MCNC: 9.6 MG/DL (ref 8.8–10.4)
CHLORIDE SERPL-SCNC: 104 MMOL/L (ref 98–107)
CHOLEST SERPL-MCNC: 159 MG/DL
CREAT SERPL-MCNC: 0.78 MG/DL (ref 0.51–0.95)
EGFRCR SERPLBLD CKD-EPI 2021: 83 ML/MIN/1.73M2
FASTING STATUS PATIENT QL REPORTED: YES
GLUCOSE SERPL-MCNC: 90 MG/DL (ref 70–99)
HCO3 SERPL-SCNC: 27 MMOL/L (ref 22–29)
HDLC SERPL-MCNC: 88 MG/DL
LDLC SERPL CALC-MCNC: 64 MG/DL
NONHDLC SERPL-MCNC: 71 MG/DL
POTASSIUM SERPL-SCNC: 4.2 MMOL/L (ref 3.4–5.3)
SODIUM SERPL-SCNC: 142 MMOL/L (ref 135–145)
TRIGL SERPL-MCNC: 37 MG/DL

## 2025-05-19 PROCEDURE — 36415 COLL VENOUS BLD VENIPUNCTURE: CPT

## 2025-05-19 PROCEDURE — 80061 LIPID PANEL: CPT

## 2025-05-19 PROCEDURE — 80048 BASIC METABOLIC PNL TOTAL CA: CPT

## 2025-05-20 ENCOUNTER — TELEPHONE (OUTPATIENT)
Dept: CARDIOLOGY | Facility: CLINIC | Age: 65
End: 2025-05-20

## 2025-05-20 DIAGNOSIS — I25.10 CORONARY ARTERY DISEASE INVOLVING NATIVE CORONARY ARTERY OF NATIVE HEART WITHOUT ANGINA PECTORIS: ICD-10-CM

## 2025-05-20 DIAGNOSIS — I25.2 H/O NON-ST ELEVATION MYOCARDIAL INFARCTION (NSTEMI): ICD-10-CM

## 2025-05-20 RX ORDER — ATORVASTATIN CALCIUM 40 MG/1
40 TABLET, FILM COATED ORAL DAILY
Qty: 90 TABLET | Refills: 1 | Status: SHIPPED | OUTPATIENT
Start: 2025-05-20

## 2025-05-20 RX ORDER — METOPROLOL SUCCINATE 25 MG/1
12.5 TABLET, EXTENDED RELEASE ORAL DAILY
Qty: 45 TABLET | Refills: 1 | Status: SHIPPED | OUTPATIENT
Start: 2025-05-20

## 2025-05-20 NOTE — TELEPHONE ENCOUNTER
[facility] Call Center    Phone Message    May a detailed message be left on voicemail: yes     Reason for Call: Patient had to cancel her appt for this am and would like a call back from a nurse to discuss why she had to cancel, thanks!       Action Taken: Message routed to:  Other: Cardio    Travel Screening: Not Applicable     Date of Service:

## 2025-05-20 NOTE — TELEPHONE ENCOUNTER
Radha Meier MD to Prachi Huynh RN    Okay to refill.  Thanks.        Prachi Huynh, RN to Radha Meier MD  [facility] Heart Team 2  5/20/25  9:06 AM    Please see my note with labs. Patient cancelled visit today due to 's illness, rescheduled for October.     =====================================================    Medications refilled and writer has already spoken with patient this morning. See other encounter.

## 2025-05-20 NOTE — TELEPHONE ENCOUNTER
Return call to patient. Patient is in tears at time of call and feels terrible she has had to cancel her appointment today with Dr. Meier. [facility]  with Cancer [facility]. Patient has already rescheduled with Dr. Meier for November. Would like to know if Dr. Meier will continue to follow her and manage her blood pressure medications. Patient informed that as long as she has a follow up scheduled we can continue to refill medications. Patient offered MONICA visit if she would like to be seen sooner. Patient would like to keep appointment in fall at this time. Call was interrupted during due to rounding Neurology team visiting her . Patient encouraged to call team 2 nurse line with any further concerns, questions or scheduling needs.    UPDATE 1326: Patient called back to team 2 nurse line. Reports when she last saw her PCP Dr. King at [facility], she changed her lisinopril to olmesartan 5 mg daily due to cough. Patient reports relief if cough and feeling fine on medication. Patient does not monitor her BP or pulse and denies any symptoms such as lightheaded, dizziness or headaches. Patient was only prescribed enough olmesartan to get to Cardiology follow up appointment today, which she has had to cancel. Would like to know if Dr. Meier would refill medication for her.       Shagufta Schulz RN  [facility]  [phone]

## 2025-05-20 NOTE — TELEPHONE ENCOUNTER
Results routed to Dr Meier to review, patient cancelled visit today due to 's illness, rescheduled for October.     Per appointment/cancellation note, patient will need additional refills.       Component      Latest Ref Rng 5/19/2025  7:59 AM   Sodium      135 - 145 mmol/L 142    Potassium      3.4 - 5.3 mmol/L 4.2    Chloride      98 - 107 mmol/L 104    Carbon Dioxide (CO2)      22 - 29 mmol/L 27    Anion Gap      7 - 15 mmol/L 11    Urea Nitrogen      8.0 - 23.0 mg/dL 17.4    Creatinine      0.51 - 0.95 mg/dL 0.78    GFR Estimate      >60 mL/min/1.73m2 83    Calcium      8.8 - 10.4 mg/dL 9.6    Glucose      70 - 99 mg/dL 90    Cholesterol      <200 mg/dL 159    Triglycerides      <150 mg/dL 37    HDL Cholesterol      >=50 mg/dL 88    LDL Cholesterol Calculated      <100 mg/dL 64    Non HDL Cholesterol      <130 mg/dL 71    Patient Fasting? Yes

## 2025-05-21 RX ORDER — OLMESARTAN MEDOXOMIL 5 MG/1
5 TABLET ORAL DAILY
Qty: 90 TABLET | Refills: 1 | Status: SHIPPED | OUTPATIENT
Start: 2025-05-21

## 2025-05-21 NOTE — TELEPHONE ENCOUNTER
Radha Meier MD to Me  [facility] Heart Team 2  5/21/25 11:07 AM    Yes, lets refill it.  Please also let her know that we can reschedule the appointment earlier at her convenience and we completely understand why she was unable to make the appointment.  Thanks.    ===============================================    Patient called and informed of above message from Dr. Meier. Patient appreciates Dr. Meier's assistance and understanding. Medication reordered for 90 days with one refill. Patient is currently scheduled with Dr. Meier in October. Instructed to call team 2 nurse line if she would like to be seen sooner.    Shagufta Schulz RN  [facility]  [phone]

## (undated) DEVICE — TOTE ANGIO CORP PC15AT SAN32CC83O

## (undated) DEVICE — CATH ANGIO INFINITI JR4 4FRX100CM 538421

## (undated) DEVICE — SYR ANGIOGRAPHY MULTIUSE KIT ACIST 014612

## (undated) DEVICE — WIRE GUIDE 0.035"X260CM SAFE-T-J EXCHANGE G00517

## (undated) DEVICE — DEFIB PRO-PADZ LVP LQD GEL ADULT 8900-2105-01

## (undated) DEVICE — KIT HAND CONTROL ACIST 016795

## (undated) DEVICE — ELECTRODE MEDITRACE MULT FUNC AED ADULT 20770

## (undated) DEVICE — INTRO GLIDESHEATH SLENDER 6FR 10X45CM 60-1060

## (undated) DEVICE — SLEEVE TR BAND RADIAL COMPRESSION DEVICE 24CM TRB24-REG

## (undated) DEVICE — CATH ANGIO INFINITI JL4 4FRX100CM 538420

## (undated) DEVICE — MANIFOLD KIT ANGIO AUTOMATED 014613

## (undated) RX ORDER — NITROGLYCERIN 5 MG/ML
VIAL (ML) INTRAVENOUS
Status: DISPENSED
Start: 2022-05-31

## (undated) RX ORDER — REGADENOSON 0.08 MG/ML
INJECTION, SOLUTION INTRAVENOUS
Status: DISPENSED
Start: 2017-01-30

## (undated) RX ORDER — HEPARIN SODIUM 200 [USP'U]/100ML
INJECTION, SOLUTION INTRAVENOUS
Status: DISPENSED
Start: 2022-05-31

## (undated) RX ORDER — VERAPAMIL HYDROCHLORIDE 2.5 MG/ML
INJECTION, SOLUTION INTRAVENOUS
Status: DISPENSED
Start: 2022-05-31

## (undated) RX ORDER — LIDOCAINE HYDROCHLORIDE 10 MG/ML
INJECTION, SOLUTION EPIDURAL; INFILTRATION; INTRACAUDAL; PERINEURAL
Status: DISPENSED
Start: 2022-05-31

## (undated) RX ORDER — NITROGLYCERIN 5 MG/ML
VIAL (ML) INTRAVENOUS
Status: DISPENSED
Start: 2018-10-10

## (undated) RX ORDER — REGADENOSON 0.08 MG/ML
INJECTION, SOLUTION INTRAVENOUS
Status: DISPENSED
Start: 2018-10-05

## (undated) RX ORDER — HEPARIN SODIUM 1000 [USP'U]/ML
INJECTION, SOLUTION INTRAVENOUS; SUBCUTANEOUS
Status: DISPENSED
Start: 2022-05-31

## (undated) RX ORDER — HEPARIN SODIUM 1000 [USP'U]/ML
INJECTION, SOLUTION INTRAVENOUS; SUBCUTANEOUS
Status: DISPENSED
Start: 2018-10-10

## (undated) RX ORDER — FENTANYL CITRATE 50 UG/ML
INJECTION, SOLUTION INTRAMUSCULAR; INTRAVENOUS
Status: DISPENSED
Start: 2022-05-31

## (undated) RX ORDER — POTASSIUM CHLORIDE 1500 MG/1
TABLET, EXTENDED RELEASE ORAL
Status: DISPENSED
Start: 2022-05-31

## (undated) RX ORDER — LIDOCAINE HYDROCHLORIDE 10 MG/ML
INJECTION, SOLUTION EPIDURAL; INFILTRATION; INTRACAUDAL; PERINEURAL
Status: DISPENSED
Start: 2018-10-10

## (undated) RX ORDER — FENTANYL CITRATE 50 UG/ML
INJECTION, SOLUTION INTRAMUSCULAR; INTRAVENOUS
Status: DISPENSED
Start: 2018-10-10